# Patient Record
Sex: FEMALE | Race: WHITE | NOT HISPANIC OR LATINO | Employment: OTHER | URBAN - METROPOLITAN AREA
[De-identification: names, ages, dates, MRNs, and addresses within clinical notes are randomized per-mention and may not be internally consistent; named-entity substitution may affect disease eponyms.]

---

## 2017-08-30 ENCOUNTER — TRANSCRIBE ORDERS (OUTPATIENT)
Dept: ADMINISTRATIVE | Facility: HOSPITAL | Age: 82
End: 2017-08-30

## 2017-08-30 DIAGNOSIS — Z13.9 SCREENING: Primary | ICD-10-CM

## 2017-09-06 ENCOUNTER — HOSPITAL ENCOUNTER (OUTPATIENT)
Dept: MAMMOGRAPHY | Facility: HOSPITAL | Age: 82
Discharge: HOME | End: 2017-09-06
Admitting: FAMILY MEDICINE

## 2017-09-06 DIAGNOSIS — Z13.9 SCREENING: ICD-10-CM

## 2017-09-06 PROCEDURE — 77063 BREAST TOMOSYNTHESIS BI: CPT

## 2017-09-06 PROCEDURE — G0202 SCR MAMMO BI INCL CAD: HCPCS

## 2018-02-19 ENCOUNTER — TRANSCRIBE ORDERS (OUTPATIENT)
Dept: ADMINISTRATIVE | Facility: HOSPITAL | Age: 83
End: 2018-02-19

## 2018-02-19 DIAGNOSIS — Z78.0 POSTMENOPAUSAL: Primary | ICD-10-CM

## 2018-02-26 ENCOUNTER — APPOINTMENT (OUTPATIENT)
Dept: BONE DENSITY | Facility: HOSPITAL | Age: 83
End: 2018-02-26

## 2018-02-26 DIAGNOSIS — Z78.0 POSTMENOPAUSAL: ICD-10-CM

## 2018-02-26 PROCEDURE — 77080 DXA BONE DENSITY AXIAL: CPT

## 2018-08-07 ENCOUNTER — TRANSCRIBE ORDERS (OUTPATIENT)
Dept: ADMINISTRATIVE | Facility: HOSPITAL | Age: 83
End: 2018-08-07

## 2018-08-07 DIAGNOSIS — Z12.39 SCREENING BREAST EXAMINATION: Primary | ICD-10-CM

## 2018-09-10 ENCOUNTER — HOSPITAL ENCOUNTER (OUTPATIENT)
Dept: MAMMOGRAPHY | Facility: HOSPITAL | Age: 83
Discharge: HOME | End: 2018-09-10
Admitting: FAMILY MEDICINE

## 2018-09-10 DIAGNOSIS — Z12.39 SCREENING BREAST EXAMINATION: ICD-10-CM

## 2018-09-10 PROCEDURE — 77067 SCR MAMMO BI INCL CAD: CPT

## 2018-09-10 PROCEDURE — 77063 BREAST TOMOSYNTHESIS BI: CPT

## 2018-11-01 ENCOUNTER — HOSPITAL ENCOUNTER (EMERGENCY)
Facility: HOSPITAL | Age: 83
Discharge: HOME | End: 2018-11-01
Attending: EMERGENCY MEDICINE | Admitting: EMERGENCY MEDICINE

## 2018-11-01 VITALS
HEART RATE: 71 BPM | BODY MASS INDEX: 31.49 KG/M2 | TEMPERATURE: 98.9 F | OXYGEN SATURATION: 98 % | DIASTOLIC BLOOD PRESSURE: 84 MMHG | RESPIRATION RATE: 16 BRPM | WEIGHT: 184.44 LBS | HEIGHT: 64 IN | SYSTOLIC BLOOD PRESSURE: 162 MMHG

## 2018-11-01 DIAGNOSIS — T18.128A FOOD IMPACTION OF ESOPHAGUS, INITIAL ENCOUNTER: Primary | ICD-10-CM

## 2018-11-01 PROCEDURE — 99282 EMERGENCY DEPT VISIT SF MDM: CPT | Performed by: PHYSICIAN ASSISTANT

## 2018-11-01 PROCEDURE — 99282 EMERGENCY DEPT VISIT SF MDM: CPT

## 2018-11-01 NOTE — ED PROVIDER NOTES
"Subjective   History of Present Illness  History of Present Illness    Chief complaint: \"I have chicken stuck in my throat\"    Location: Upper esophagus    Quality/Severity:  \"Stuck\", moderate    Timing/Duration: Just prior to arrival    Modifying Factors: Nothing makes worse or better    Associated Symptoms: Denies shortness of breath.  Denies cough.  Denies rhinorrhea.  Denies chest pain.    Narrative: 84-year-old female presents after she was eating at a restaurant and felt like she got chicken stuck in her throat.  She went to the bathroom and tried to make herself vomit but the food did not appear to move.  She is never had this happen before.  She called her  was told to come to the ER.    Review of Systems  General: Denies fevers or chills.  Denies any weakness or fatigue.  Denies any weight loss or weight gain.  SKIN: Denies any rashes lesions or ulcers.  Denies color change.  ENT: Denies sore throat or rhinorrhea.  Denies ear pain.    EYES: Denies any blurred vision.  Denies any change in vision.  Denies any photophobia.  Denies any vision loss.  LUNGS: Denies any shortness of breath or wheezing.  Denies any cough.  Denies any hemoptysis.  CARDIAC: Denies any chest pain.  Denies palpitations.  Denies syncope.  Denies any edema  ABD: Denies any abdominal pain.  Denies any nausea or vomiting or diarrhea.  Denies any rectal bleeding.  Denies constipation  : Denies any dysuria, urgency, frequency or hematuria.  Denies discharge.  Denies flank pain.  NEURO: Denies any focal weakness.  Denies headache.  Denies seizures.  Denies changes in speech or difficulty walking.  ENDOCRINE: Denies polydipsia and polyuria  M/S: Denies arthralgias, back pain, myalgias or neck pain  HEME/LYMPH: Negative for adenopathy. Does not bruise/bleed easily.   PSYCH: Negative for suicidal ideas. Denies anxiety or depression  review was performed in addition to those in the above all other reviews are negative.      Past Medical " History:   Diagnosis Date   • Breast cancer (CMS/HCC) 2001    left lumpectomy       No Known Allergies    Past Surgical History:   Procedure Laterality Date   • BREAST BIOPSY     • BREAST LUMPECTOMY Left 2011   • CARPAL TUNNEL RELEASE     • HYSTERECTOMY         Family History   Problem Relation Age of Onset   • Stroke Mother    • Stroke Brother        Social History     Social History   • Marital status:      Social History Main Topics   • Smoking status: Never Smoker   • Smokeless tobacco: Never Used   • Alcohol use No   • Drug use: No   • Sexual activity: Defer     Other Topics Concern   • Not on file     No current facility-administered medications for this encounter.     Current Outpatient Prescriptions:   •  acetaminophen-codeine (TYLENOL #3) 300-30 MG per tablet, , Disp: , Rfl:   •  allopurinol (ZYLOPRIM) 300 MG tablet, , Disp: , Rfl:   •  clopidogrel (PLAVIX) 75 MG tablet, , Disp: , Rfl:   •  irbesartan-hydrochlorothiazide (AVALIDE) 300-12.5 MG tablet, , Disp: , Rfl:   •  metoprolol tartrate (LOPRESSOR) 50 MG tablet, , Disp: , Rfl:   •  pravastatin (PRAVACHOL) 40 MG tablet, , Disp: , Rfl:   •  venlafaxine XR (EFFEXOR-XR) 75 MG 24 hr capsule, , Disp: , Rfl:         Objective   Physical Exam  Vitals:    11/01/18 1314   BP: 180/90   Pulse:    Resp:    Temp:    SpO2:    Heart rate 67, respirations 20, oxygen saturation is 99% on room air, temperature 98.9    GENERAL: a/o x 4, NAD.  Speaking in full sentences.  SKIN: Warm pink and dry   HEENT:  PERRLA, EOM intact, conjunctiva normal, sclera clear.  Oropharynx clear.  Dentures removed.  No foreign bodies noted.  No pharyngeal erythema, edema or bleeding.   NECK: supple, no masses  LUNGS: Clear to auscultation bilaterally without wheezes, rales or rhonchi.  No accessory muscle use and no nasal flaring.  CARDIAC:  Regular rate and rhythm, S1-S2.  No murmurs, rubs or gallops.  No peripheral edema.  Equal pulses bilaterally.  ABDOMEN: Soft, nontender,  nondistended.  No guarding or rebound tenderness.  Normal bowel sounds.  MUSCULOSKELETAL: Moves all extremities well.  No deformity.  NEURO: Cranial nerves II through XII grossly intact.  No gross focal deficits.  Alert.  Normal speech and motor.  PSYCH: Anxious      Procedures           ED Course    During physical exam patient states that the foreign body sensation past and it has resolved.  She has no complaints at this time.    1335- pt doing well.  Asymptomatic.  No pain. bobby po without difficulty     Pt has been counseled on elevated BP and instructed to f/u with PMD for repeat BP check.  Discussed pertinent labs and imaging findings with the patient/family.  Patient/Family voiced understanding of need to follow-up for recheck, further testing as needed.  Return to the emergency Department warnings were given.          MDM  Number of Diagnoses or Management Options  Food impaction of esophagus, initial encounter: new and requires workup     Amount and/or Complexity of Data Reviewed  Tests in the medicine section of CPT®: reviewed and ordered    Risk of Complications, Morbidity, and/or Mortality  Presenting problems: low  Diagnostic procedures: low  Management options: low    Patient Progress  Patient progress: improved        Final diagnoses:   Food impaction of esophagus, initial encounter     Dictated utilizing Dragon dictation         Jess Ortega PA-C  11/01/18 3592

## 2018-11-01 NOTE — DISCHARGE INSTRUCTIONS
Return to the emergency department with worsening symptoms, uncontrolled pain, inability to tolerate oral liquids, fever greater than 101°F not controlled by Tylenol or as needed with emergent concerns.    Eat small bites and chew food well before swallowing.

## 2018-11-01 NOTE — ED NOTES
"When pt arrived in room 2, pt coughed and stated \"i think its gone now, I should have just gone home\" pt no longer coughing and short of air.      Wendy Small RN  11/01/18 7328    "

## 2019-09-16 ENCOUNTER — TRANSCRIBE ORDERS (OUTPATIENT)
Dept: ADMINISTRATIVE | Facility: HOSPITAL | Age: 84
End: 2019-09-16

## 2019-09-16 DIAGNOSIS — Z12.39 SCREENING BREAST EXAMINATION: Primary | ICD-10-CM

## 2019-09-20 ENCOUNTER — HOSPITAL ENCOUNTER (OUTPATIENT)
Dept: MAMMOGRAPHY | Facility: HOSPITAL | Age: 84
Discharge: HOME | End: 2019-09-20
Admitting: FAMILY MEDICINE

## 2019-09-20 DIAGNOSIS — Z12.39 SCREENING BREAST EXAMINATION: ICD-10-CM

## 2019-09-20 PROCEDURE — 77067 SCR MAMMO BI INCL CAD: CPT

## 2019-09-20 PROCEDURE — 77063 BREAST TOMOSYNTHESIS BI: CPT

## 2019-10-08 ENCOUNTER — TRANSCRIBE ORDERS (OUTPATIENT)
Dept: ADMINISTRATIVE | Facility: HOSPITAL | Age: 84
End: 2019-10-08

## 2019-10-08 DIAGNOSIS — I82.409 DEEP VEIN THROMBOSIS PROGRESSION (HCC): Primary | ICD-10-CM

## 2019-10-16 ENCOUNTER — HOSPITAL ENCOUNTER (OUTPATIENT)
Dept: ULTRASOUND IMAGING | Facility: HOSPITAL | Age: 84
Discharge: HOME | End: 2019-10-16
Admitting: FAMILY MEDICINE

## 2019-10-16 DIAGNOSIS — I82.409 DEEP VEIN THROMBOSIS PROGRESSION (HCC): ICD-10-CM

## 2019-10-16 PROCEDURE — 93971 EXTREMITY STUDY: CPT

## 2020-09-11 ENCOUNTER — TRANSCRIBE ORDERS (OUTPATIENT)
Dept: ADMINISTRATIVE | Facility: HOSPITAL | Age: 85
End: 2020-09-11

## 2020-09-11 DIAGNOSIS — Z12.31 VISIT FOR SCREENING MAMMOGRAM: Primary | ICD-10-CM

## 2020-10-12 ENCOUNTER — APPOINTMENT (OUTPATIENT)
Dept: MAMMOGRAPHY | Facility: HOSPITAL | Age: 85
End: 2020-10-12

## 2020-11-10 ENCOUNTER — HOSPITAL ENCOUNTER (OUTPATIENT)
Dept: MAMMOGRAPHY | Facility: HOSPITAL | Age: 85
Discharge: HOME | End: 2020-11-10
Admitting: FAMILY MEDICINE

## 2020-11-10 DIAGNOSIS — Z12.31 VISIT FOR SCREENING MAMMOGRAM: ICD-10-CM

## 2020-11-10 PROCEDURE — 77067 SCR MAMMO BI INCL CAD: CPT

## 2020-11-10 PROCEDURE — 77063 BREAST TOMOSYNTHESIS BI: CPT

## 2021-01-14 ENCOUNTER — APPOINTMENT (OUTPATIENT)
Dept: GENERAL RADIOLOGY | Facility: HOSPITAL | Age: 86
End: 2021-01-14

## 2021-01-14 ENCOUNTER — HOSPITAL ENCOUNTER (EMERGENCY)
Facility: HOSPITAL | Age: 86
Discharge: HOME | End: 2021-01-14
Attending: EMERGENCY MEDICINE | Admitting: EMERGENCY MEDICINE

## 2021-01-14 VITALS
DIASTOLIC BLOOD PRESSURE: 87 MMHG | RESPIRATION RATE: 20 BRPM | TEMPERATURE: 97.3 F | OXYGEN SATURATION: 96 % | HEIGHT: 63 IN | WEIGHT: 170 LBS | SYSTOLIC BLOOD PRESSURE: 173 MMHG | HEART RATE: 73 BPM | BODY MASS INDEX: 30.12 KG/M2

## 2021-01-14 DIAGNOSIS — S61.411A SKIN TEAR OF RIGHT HAND WITHOUT COMPLICATION, INITIAL ENCOUNTER: Primary | ICD-10-CM

## 2021-01-14 PROCEDURE — 99283 EMERGENCY DEPT VISIT LOW MDM: CPT

## 2021-01-14 PROCEDURE — 25010000002 TDAP 5-2.5-18.5 LF-MCG/0.5 SUSPENSION: Performed by: PHYSICIAN ASSISTANT

## 2021-01-14 PROCEDURE — 90471 IMMUNIZATION ADMIN: CPT | Performed by: PHYSICIAN ASSISTANT

## 2021-01-14 PROCEDURE — 99282 EMERGENCY DEPT VISIT SF MDM: CPT | Performed by: PHYSICIAN ASSISTANT

## 2021-01-14 PROCEDURE — 73130 X-RAY EXAM OF HAND: CPT

## 2021-01-14 PROCEDURE — 90715 TDAP VACCINE 7 YRS/> IM: CPT | Performed by: PHYSICIAN ASSISTANT

## 2021-01-14 RX ORDER — CEPHALEXIN 500 MG/1
500 CAPSULE ORAL 3 TIMES DAILY
Qty: 15 CAPSULE | Refills: 0 | Status: SHIPPED | OUTPATIENT
Start: 2021-01-14 | End: 2021-01-19

## 2021-01-14 RX ADMIN — TETANUS TOXOID, REDUCED DIPHTHERIA TOXOID AND ACELLULAR PERTUSSIS VACCINE, ADSORBED 0.5 ML: 5; 2.5; 8; 8; 2.5 SUSPENSION INTRAMUSCULAR at 18:04

## 2021-01-14 NOTE — DISCHARGE INSTRUCTIONS
Keep wound clean and dry for the next 24 hours.  After 24 hours you may wash with soap and water.  Please take antibiotics as directed to help prevent infection.  Follow-up with your PCP for wound recheck next week.  Please return to the emergency department, PCP, or urgent care if you develop any redness, swelling, or drainage from the wound.

## 2021-01-14 NOTE — ED PROVIDER NOTES
EMERGENCY DEPARTMENT ENCOUNTER      Room Number: HALB/HB    History is provided by the patient, no translation services needed    HPI:    Chief complaint: Hand injury    Location: Right dorsal hand    Quality/Severity: 5/10    Timing/Duration: Injury occurred just prior to arrival today    Modifying Factors: Patient applied pressure with gauze, bleeding controlled on arrival.  Patient states she does have some pain when she moves her fingers.    Associated Symptoms: Positive for laceration to right hand.  Denies any numbness or decreased range of motion.    Narrative: Pt is a 86 y.o. female who presents complaining of laceration to right hand that occurred just prior to arrival today.  Patient states she was grocery shopping at [employer] and went to pull a pack of water off of the shelf when she hit the metal shelf above it and cut the back of her hand.  She states she has thin skin, she is on aspirin and Plavix, and states she gets skin tears and also bruises frequently.  Bleeding is controlled on arrival.      PMD: Gonzalez Dai MD    REVIEW OF SYSTEMS  Review of Systems   Constitutional: Negative for chills and fever.   Respiratory: Negative for cough and shortness of breath.    Cardiovascular: Negative for chest pain and palpitations.   Musculoskeletal: Positive for arthralgias and joint swelling.   Skin: Positive for wound. Negative for rash.   Neurological: Negative for dizziness and syncope.   Hematological: Bruises/bleeds easily.   Psychiatric/Behavioral: Negative for confusion. The patient is not nervous/anxious.          PAST MEDICAL HISTORY  Active Ambulatory Problems     Diagnosis Date Noted   • No Active Ambulatory Problems     Resolved Ambulatory Problems     Diagnosis Date Noted   • No Resolved Ambulatory Problems     Past Medical History:   Diagnosis Date   • Breast cancer (CMS/HCC) 2001   • Hyperlipidemia    • Hypertension        PAST SURGICAL HISTORY  Past Surgical History:   Procedure Laterality  Date   • BREAST BIOPSY     • BREAST LUMPECTOMY Left 2011   • CARDIAC SURGERY     • CARPAL TUNNEL RELEASE     • HYSTERECTOMY     • TONSILLECTOMY         FAMILY HISTORY  Family History   Problem Relation Age of Onset   • Stroke Mother    • Stroke Brother    • Breast cancer Neg Hx        SOCIAL HISTORY  Social History     Socioeconomic History   • Marital status:      Spouse name: Not on file   • Number of children: Not on file   • Years of education: Not on file   • Highest education level: Not on file   Tobacco Use   • Smoking status: Never Smoker   • Smokeless tobacco: Never Used   Substance and Sexual Activity   • Alcohol use: No   • Drug use: No   • Sexual activity: Defer       ALLERGIES  Patient has no known allergies.    No current facility-administered medications for this encounter.     Current Outpatient Medications:   •  acetaminophen-codeine (TYLENOL #3) 300-30 MG per tablet, , Disp: , Rfl:   •  allopurinol (ZYLOPRIM) 300 MG tablet, , Disp: , Rfl:   •  clopidogrel (PLAVIX) 75 MG tablet, , Disp: , Rfl:   •  irbesartan-hydrochlorothiazide (AVALIDE) 300-12.5 MG tablet, , Disp: , Rfl:   •  metoprolol tartrate (LOPRESSOR) 50 MG tablet, , Disp: , Rfl:   •  pravastatin (PRAVACHOL) 40 MG tablet, , Disp: , Rfl:   •  venlafaxine XR (EFFEXOR-XR) 75 MG 24 hr capsule, , Disp: , Rfl:   •  cephalexin (KEFLEX) 500 MG capsule, Take 1 capsule by mouth 3 (Three) Times a Day for 5 days., Disp: 15 capsule, Rfl: 0    PHYSICAL EXAM  ED Triage Vitals   Temp Heart Rate Resp BP SpO2   01/14/21 1656 01/14/21 1658 01/14/21 1656 01/14/21 1659 01/14/21 1658   97.3 °F (36.3 °C) 73 20 173/87 96 %      Temp src Heart Rate Source Patient Position BP Location FiO2 (%)   01/14/21 1656 01/14/21 1656 01/14/21 1656 01/14/21 1656 --   Tympanic Monitor Lying Right arm        Physical Exam   Constitutional: She is oriented to person, place, and time and well-developed, well-nourished, and in no distress.   HENT:   Head: Normocephalic and  atraumatic.   Eyes: Pupils are equal, round, and reactive to light. Conjunctivae are normal.   Cardiovascular: Normal rate, regular rhythm and intact distal pulses.   Pulmonary/Chest: Effort normal. No respiratory distress.   Musculoskeletal: Normal range of motion.         General: No edema.      Right hand: She exhibits bony tenderness and laceration (3 cm skin tear to dorsal aspect of right hand.). She exhibits normal capillary refill. Normal sensation noted.        Hands:       Comments: Radial and ulnar pulses are 2+ in right upper extremity.   Neurological: She is alert and oriented to person, place, and time. GCS score is 15.   Skin: Skin is warm and dry.   Psychiatric: Mood, memory, affect and judgment normal.   Nursing note and vitals reviewed.        LAB RESULTS  Lab Results (last 24 hours)     ** No results found for the last 24 hours. **            I ordered the above labs and reviewed the results    RADIOLOGY  Xr Hand 3+ View Right    Result Date: 1/14/2021  CR Hand Min 3 Vws RT INDICATION: Acute right hand pain., Laceration to dorsal aspect of the right hand COMPARISON: None available. FINDINGS: 3 views of the right hand. No acute fracture or subluxation. The bones are osteopenic. No foreign body.     No acute fracture or subluxation. No evidence of foreign body. Signer Name: Cari Savage MD  Signed: 1/14/2021 6:07 PM  Workstation Name: [id number]  [facility]      I ordered the above radiologic testing and reviewed the results    PROCEDURES  Procedures      PROGRESS AND CONSULTS  ED Course as of Jan 14 1834   Thu Jan 14, 2021 1825 Discussed x-ray results with patient.  They are negative for any acute bony abnormality.  Her bleeding is controlled and she has actually formed a clot at the site of the skin tear.  She states she does not want any sutures placed, and she would rather have a bandage.  I did recommend we apply some Steri-Strips to her wound, and she is agreeable with  this.  Patient's wound was extensively cleaned with chlorhexidine, and irrigated with normal saline.  Mastisol and Steri-Strips applied, bandage placed with Telfa, 4 x 4, and Coban.  We will treat her with Keflex for infection prophylaxis since this wound is not entirely clean and present on the hand.  Patient's tetanus was updated today.  I have discussed return to ER warnings, patient is agreeable with plan and verbalizes understanding.    [KS]      ED Course User Index  [KS] Lashonda Rodriguez PA-C           MEDICAL DECISION MAKING  Results were reviewed/discussed with the patient and they were also made aware of online access. Pt also made aware that some labs, such as cultures, will not be resulted during ER visit and follow up with PMD is necessary.     MDM       My differential diagnosis of the upper extremity includes but is not limited to contusions of the shoulder, forearm, arm, wrist, elbow or hand, dislocations of shoulder, elbow, wrist, digits, shoulder sprain, elbow sprain, wrist sprain, digit sprain, shoulder strain, arm strain, forearm strain, elbow strain, wrist strain, hand sprain, digit strain, lacerations of the upper extremity, fractures both closed and open of radius, ulna and humerus, cellulitis or abscess, cervical radiculopathy, radial nerve palsy, neurogenic upper extremity pain.      DIAGNOSIS  Final diagnoses:   Skin tear of right hand without complication, initial encounter       Latest Documented Vital Signs:  As of 18:34 EST  BP- 173/87 HR- 73 Temp- 97.3 °F (36.3 °C) (Tympanic) O2 sat- 96%    DISPOSITION  Patient discharged home.    Discussed pertinent imaging findings with the patient/family.  Patient/Family voiced understanding of need to follow-up for recheck, further testing as needed.  Return to the emergency Department warnings were given.         Medication List      New Prescriptions    cephalexin 500 MG capsule  Commonly known as: KEFLEX  Take 1 capsule by mouth 3 (Three)  Times a Day for 5 days.           Where to Get Your Medications      These medications were sent to [id number] - [place] - [address] - [phone]  - [phone]   [address], [place] [address]    Phone: [phone]   · cephalexin 500 MG capsule             Follow-up Information     Gonzalez Dai MD. Call on 1/15/2021.    Specialty: Family Medicine  Why: To schedule follow-up appointment for wound check next week  Contact information:  [address] [place] [id number] [phone]                     Dictated utilizing Dragon dictation     Lashonda Rodriguez PA-C  01/14/21 3105

## 2021-03-17 ENCOUNTER — IMMUNIZATION (OUTPATIENT)
Dept: VACCINE CLINIC | Facility: HOSPITAL | Age: 86
End: 2021-03-17

## 2021-03-17 PROCEDURE — 91300 HC SARSCOV02 VAC 30MCG/0.3ML IM: CPT | Performed by: OBSTETRICS & GYNECOLOGY

## 2021-03-17 PROCEDURE — 0001A: CPT | Performed by: OBSTETRICS & GYNECOLOGY

## 2021-04-07 ENCOUNTER — IMMUNIZATION (OUTPATIENT)
Dept: VACCINE CLINIC | Facility: HOSPITAL | Age: 86
End: 2021-04-07

## 2021-04-07 PROCEDURE — 91300 HC SARSCOV02 VAC 30MCG/0.3ML IM: CPT | Performed by: OBSTETRICS & GYNECOLOGY

## 2021-04-07 PROCEDURE — 0002A: CPT | Performed by: OBSTETRICS & GYNECOLOGY

## 2021-04-12 ENCOUNTER — APPOINTMENT (OUTPATIENT)
Dept: CT IMAGING | Facility: HOSPITAL | Age: 86
End: 2021-04-12

## 2021-04-12 ENCOUNTER — HOSPITAL ENCOUNTER (OUTPATIENT)
Facility: HOSPITAL | Age: 86
Setting detail: OBSERVATION
Discharge: HOME | End: 2021-04-16
Attending: EMERGENCY MEDICINE | Admitting: FAMILY MEDICINE

## 2021-04-12 DIAGNOSIS — N39.0 ACUTE UTI: ICD-10-CM

## 2021-04-12 DIAGNOSIS — G30.1 DEMENTIA OF THE ALZHEIMER'S TYPE WITH LATE ONSET WITHOUT BEHAVIORAL DISTURBANCE (HCC): Primary | ICD-10-CM

## 2021-04-12 DIAGNOSIS — R41.82 ALTERED MENTAL STATUS, UNSPECIFIED ALTERED MENTAL STATUS TYPE: ICD-10-CM

## 2021-04-12 DIAGNOSIS — F02.80 DEMENTIA OF THE ALZHEIMER'S TYPE WITH LATE ONSET WITHOUT BEHAVIORAL DISTURBANCE (HCC): Primary | ICD-10-CM

## 2021-04-12 DIAGNOSIS — R44.3 HALLUCINATION: ICD-10-CM

## 2021-04-12 LAB
ALBUMIN SERPL-MCNC: 3.6 G/DL (ref 3.5–5.2)
ALBUMIN/GLOB SERPL: 0.9 G/DL
ALP SERPL-CCNC: 69 U/L (ref 39–117)
ALT SERPL W P-5'-P-CCNC: 26 U/L (ref 1–33)
ANION GAP SERPL CALCULATED.3IONS-SCNC: 12 MMOL/L (ref 5–15)
AST SERPL-CCNC: 31 U/L (ref 1–32)
BACTERIA UR QL AUTO: ABNORMAL /HPF
BASOPHILS # BLD AUTO: 0.08 10*3/MM3 (ref 0–0.2)
BASOPHILS NFR BLD AUTO: 0.6 % (ref 0–1.5)
BILIRUB SERPL-MCNC: 0.4 MG/DL (ref 0–1.2)
BILIRUB UR QL STRIP: ABNORMAL
BUN SERPL-MCNC: 26 MG/DL (ref 8–23)
BUN/CREAT SERPL: 16.7 (ref 7–25)
CALCIUM SPEC-SCNC: 9.9 MG/DL (ref 8.6–10.5)
CHLORIDE SERPL-SCNC: 96 MMOL/L (ref 98–107)
CLARITY UR: CLEAR
CO2 SERPL-SCNC: 28 MMOL/L (ref 22–29)
COARSE GRAN CASTS URNS QL MICRO: ABNORMAL /LPF
COLOR UR: YELLOW
CREAT SERPL-MCNC: 1.56 MG/DL (ref 0.57–1)
DEPRECATED RDW RBC AUTO: 45.7 FL (ref 37–54)
EOSINOPHIL # BLD AUTO: 0.04 10*3/MM3 (ref 0–0.4)
EOSINOPHIL NFR BLD AUTO: 0.3 % (ref 0.3–6.2)
ERYTHROCYTE [DISTWIDTH] IN BLOOD BY AUTOMATED COUNT: 13.6 % (ref 12.3–15.4)
GFR SERPL CREATININE-BSD FRML MDRD: 31 ML/MIN/1.73
GLOBULIN UR ELPH-MCNC: 4.1 GM/DL
GLUCOSE SERPL-MCNC: 113 MG/DL (ref 65–99)
GLUCOSE UR STRIP-MCNC: NEGATIVE MG/DL
HCT VFR BLD AUTO: 42.2 % (ref 34–46.6)
HGB BLD-MCNC: 13.6 G/DL (ref 12–15.9)
HGB UR QL STRIP.AUTO: NEGATIVE
HOLD SPECIMEN: NORMAL
HOLD SPECIMEN: NORMAL
HYALINE CASTS UR QL AUTO: ABNORMAL /LPF
IMM GRANULOCYTES # BLD AUTO: 0.08 10*3/MM3 (ref 0–0.05)
IMM GRANULOCYTES NFR BLD AUTO: 0.6 % (ref 0–0.5)
KETONES UR QL STRIP: ABNORMAL
LEUKOCYTE ESTERASE UR QL STRIP.AUTO: ABNORMAL
LYMPHOCYTES # BLD AUTO: 2.36 10*3/MM3 (ref 0.7–3.1)
LYMPHOCYTES NFR BLD AUTO: 16.6 % (ref 19.6–45.3)
MCH RBC QN AUTO: 29.9 PG (ref 26.6–33)
MCHC RBC AUTO-ENTMCNC: 32.2 G/DL (ref 31.5–35.7)
MCV RBC AUTO: 92.7 FL (ref 79–97)
MONOCYTES # BLD AUTO: 1.27 10*3/MM3 (ref 0.1–0.9)
MONOCYTES NFR BLD AUTO: 8.9 % (ref 5–12)
NEUTROPHILS NFR BLD AUTO: 10.36 10*3/MM3 (ref 1.7–7)
NEUTROPHILS NFR BLD AUTO: 73 % (ref 42.7–76)
NITRITE UR QL STRIP: NEGATIVE
NRBC BLD AUTO-RTO: 0 /100 WBC (ref 0–0.2)
PH UR STRIP.AUTO: 6 [PH] (ref 4.5–8)
PLATELET # BLD AUTO: 331 10*3/MM3 (ref 140–450)
PMV BLD AUTO: 11.3 FL (ref 6–12)
POTASSIUM SERPL-SCNC: 4.2 MMOL/L (ref 3.5–5.2)
PROT SERPL-MCNC: 7.7 G/DL (ref 6–8.5)
PROT UR QL STRIP: ABNORMAL
RBC # BLD AUTO: 4.55 10*6/MM3 (ref 3.77–5.28)
RBC # UR: ABNORMAL /HPF
REF LAB TEST METHOD: ABNORMAL
SARS-COV-2 RNA PNL SPEC NAA+PROBE: NOT DETECTED
SODIUM SERPL-SCNC: 136 MMOL/L (ref 136–145)
SP GR UR STRIP: 1.02 (ref 1–1.03)
SQUAMOUS #/AREA URNS HPF: ABNORMAL /HPF
TROPONIN T SERPL-MCNC: 0.04 NG/ML (ref 0–0.03)
UROBILINOGEN UR QL STRIP: ABNORMAL
WBC # BLD AUTO: 14.19 10*3/MM3 (ref 3.4–10.8)
WBC UR QL AUTO: ABNORMAL /HPF
WHOLE BLOOD HOLD SPECIMEN: NORMAL
WHOLE BLOOD HOLD SPECIMEN: NORMAL

## 2021-04-12 PROCEDURE — 87086 URINE CULTURE/COLONY COUNT: CPT | Performed by: EMERGENCY MEDICINE

## 2021-04-12 PROCEDURE — 80053 COMPREHEN METABOLIC PANEL: CPT | Performed by: EMERGENCY MEDICINE

## 2021-04-12 PROCEDURE — 85025 COMPLETE CBC W/AUTO DIFF WBC: CPT | Performed by: EMERGENCY MEDICINE

## 2021-04-12 PROCEDURE — C9803 HOPD COVID-19 SPEC COLLECT: HCPCS

## 2021-04-12 PROCEDURE — 25010000002 ENOXAPARIN PER 10 MG: Performed by: FAMILY MEDICINE

## 2021-04-12 PROCEDURE — 96361 HYDRATE IV INFUSION ADD-ON: CPT

## 2021-04-12 PROCEDURE — 99284 EMERGENCY DEPT VISIT MOD MDM: CPT

## 2021-04-12 PROCEDURE — G0378 HOSPITAL OBSERVATION PER HR: HCPCS

## 2021-04-12 PROCEDURE — 81001 URINALYSIS AUTO W/SCOPE: CPT | Performed by: EMERGENCY MEDICINE

## 2021-04-12 PROCEDURE — 99285 EMERGENCY DEPT VISIT HI MDM: CPT | Performed by: EMERGENCY MEDICINE

## 2021-04-12 PROCEDURE — 93010 ELECTROCARDIOGRAM REPORT: CPT | Performed by: INTERNAL MEDICINE

## 2021-04-12 PROCEDURE — 96372 THER/PROPH/DIAG INJ SC/IM: CPT

## 2021-04-12 PROCEDURE — 84484 ASSAY OF TROPONIN QUANT: CPT | Performed by: EMERGENCY MEDICINE

## 2021-04-12 PROCEDURE — 87635 SARS-COV-2 COVID-19 AMP PRB: CPT | Performed by: EMERGENCY MEDICINE

## 2021-04-12 PROCEDURE — 96376 TX/PRO/DX INJ SAME DRUG ADON: CPT

## 2021-04-12 PROCEDURE — 70450 CT HEAD/BRAIN W/O DYE: CPT

## 2021-04-12 PROCEDURE — 93005 ELECTROCARDIOGRAM TRACING: CPT | Performed by: EMERGENCY MEDICINE

## 2021-04-12 PROCEDURE — 25010000002 CEFTRIAXONE SODIUM-DEXTROSE 1-3.74 GM-%(50ML) RECONSTITUTED SOLUTION: Performed by: EMERGENCY MEDICINE

## 2021-04-12 RX ORDER — ACETAMINOPHEN 650 MG/1
650 SUPPOSITORY RECTAL EVERY 4 HOURS PRN
Status: DISCONTINUED | OUTPATIENT
Start: 2021-04-12 | End: 2021-04-16 | Stop reason: HOSPADM

## 2021-04-12 RX ORDER — SODIUM CHLORIDE 9 MG/ML
75 INJECTION, SOLUTION INTRAVENOUS CONTINUOUS
Status: DISCONTINUED | OUTPATIENT
Start: 2021-04-12 | End: 2021-04-13

## 2021-04-12 RX ORDER — SODIUM CHLORIDE 9 MG/ML
40 INJECTION, SOLUTION INTRAVENOUS AS NEEDED
Status: DISCONTINUED | OUTPATIENT
Start: 2021-04-12 | End: 2021-04-16 | Stop reason: HOSPADM

## 2021-04-12 RX ORDER — ONDANSETRON 4 MG/1
4 TABLET, FILM COATED ORAL EVERY 6 HOURS PRN
Status: DISCONTINUED | OUTPATIENT
Start: 2021-04-12 | End: 2021-04-16 | Stop reason: HOSPADM

## 2021-04-12 RX ORDER — CEFTRIAXONE 1 G/50ML
1 INJECTION, SOLUTION INTRAVENOUS EVERY 24 HOURS
Status: DISCONTINUED | OUTPATIENT
Start: 2021-04-13 | End: 2021-04-16 | Stop reason: HOSPADM

## 2021-04-12 RX ORDER — ACETAMINOPHEN 325 MG/1
650 TABLET ORAL EVERY 4 HOURS PRN
Status: DISCONTINUED | OUTPATIENT
Start: 2021-04-12 | End: 2021-04-16 | Stop reason: HOSPADM

## 2021-04-12 RX ORDER — ACETAMINOPHEN 160 MG/5ML
650 SOLUTION ORAL EVERY 4 HOURS PRN
Status: DISCONTINUED | OUTPATIENT
Start: 2021-04-12 | End: 2021-04-16 | Stop reason: HOSPADM

## 2021-04-12 RX ORDER — ASCORBIC ACID 500 MG
500 TABLET ORAL DAILY
COMMUNITY

## 2021-04-12 RX ORDER — SODIUM CHLORIDE 0.9 % (FLUSH) 0.9 %
1-10 SYRINGE (ML) INJECTION AS NEEDED
Status: DISCONTINUED | OUTPATIENT
Start: 2021-04-12 | End: 2021-04-16 | Stop reason: HOSPADM

## 2021-04-12 RX ORDER — CEFTRIAXONE 1 G/50ML
1 INJECTION, SOLUTION INTRAVENOUS ONCE
Status: COMPLETED | OUTPATIENT
Start: 2021-04-12 | End: 2021-04-12

## 2021-04-12 RX ORDER — MULTIPLE VITAMINS W/ MINERALS TAB 9MG-400MCG
1 TAB ORAL DAILY
COMMUNITY

## 2021-04-12 RX ORDER — ONDANSETRON 2 MG/ML
4 INJECTION INTRAMUSCULAR; INTRAVENOUS EVERY 6 HOURS PRN
Status: DISCONTINUED | OUTPATIENT
Start: 2021-04-12 | End: 2021-04-16 | Stop reason: HOSPADM

## 2021-04-12 RX ORDER — ASPIRIN 81 MG/1
81 TABLET, CHEWABLE ORAL DAILY
COMMUNITY

## 2021-04-12 RX ORDER — SODIUM CHLORIDE 0.9 % (FLUSH) 0.9 %
10 SYRINGE (ML) INJECTION EVERY 12 HOURS SCHEDULED
Status: DISCONTINUED | OUTPATIENT
Start: 2021-04-12 | End: 2021-04-16 | Stop reason: HOSPADM

## 2021-04-12 RX ADMIN — SODIUM CHLORIDE, PRESERVATIVE FREE 10 ML: 5 INJECTION INTRAVENOUS at 23:23

## 2021-04-12 RX ADMIN — ENOXAPARIN SODIUM 30 MG: 30 INJECTION SUBCUTANEOUS at 23:24

## 2021-04-12 RX ADMIN — SODIUM CHLORIDE 75 ML/HR: 9 INJECTION, SOLUTION INTRAVENOUS at 23:24

## 2021-04-12 RX ADMIN — CEFTRIAXONE 1 G: 1 INJECTION, SOLUTION INTRAVENOUS at 20:15

## 2021-04-13 LAB
ANION GAP SERPL CALCULATED.3IONS-SCNC: 20 MMOL/L (ref 5–15)
BUN SERPL-MCNC: 30 MG/DL (ref 8–23)
BUN/CREAT SERPL: 19.9 (ref 7–25)
CALCIUM SPEC-SCNC: 8.9 MG/DL (ref 8.6–10.5)
CHLORIDE SERPL-SCNC: 100 MMOL/L (ref 98–107)
CO2 SERPL-SCNC: 21 MMOL/L (ref 22–29)
CREAT SERPL-MCNC: 1.51 MG/DL (ref 0.57–1)
DEPRECATED RDW RBC AUTO: 47.4 FL (ref 37–54)
ERYTHROCYTE [DISTWIDTH] IN BLOOD BY AUTOMATED COUNT: 13.6 % (ref 12.3–15.4)
GFR SERPL CREATININE-BSD FRML MDRD: 33 ML/MIN/1.73
GLUCOSE SERPL-MCNC: 121 MG/DL (ref 65–99)
HCT VFR BLD AUTO: 39.6 % (ref 34–46.6)
HGB BLD-MCNC: 12.4 G/DL (ref 12–15.9)
MCH RBC QN AUTO: 29.7 PG (ref 26.6–33)
MCHC RBC AUTO-ENTMCNC: 31.3 G/DL (ref 31.5–35.7)
MCV RBC AUTO: 95 FL (ref 79–97)
PLATELET # BLD AUTO: 218 10*3/MM3 (ref 140–450)
PMV BLD AUTO: 10.8 FL (ref 6–12)
POTASSIUM SERPL-SCNC: 3.6 MMOL/L (ref 3.5–5.2)
RBC # BLD AUTO: 4.17 10*6/MM3 (ref 3.77–5.28)
SODIUM SERPL-SCNC: 141 MMOL/L (ref 136–145)
TROPONIN T SERPL-MCNC: 0.02 NG/ML (ref 0–0.03)
WBC # BLD AUTO: 10.17 10*3/MM3 (ref 3.4–10.8)

## 2021-04-13 PROCEDURE — 80048 BASIC METABOLIC PNL TOTAL CA: CPT | Performed by: FAMILY MEDICINE

## 2021-04-13 PROCEDURE — 25010000002 ONDANSETRON PER 1 MG: Performed by: FAMILY MEDICINE

## 2021-04-13 PROCEDURE — 96372 THER/PROPH/DIAG INJ SC/IM: CPT

## 2021-04-13 PROCEDURE — 96361 HYDRATE IV INFUSION ADD-ON: CPT

## 2021-04-13 PROCEDURE — 96365 THER/PROPH/DIAG IV INF INIT: CPT

## 2021-04-13 PROCEDURE — 25010000002 ENOXAPARIN PER 10 MG: Performed by: FAMILY MEDICINE

## 2021-04-13 PROCEDURE — 97161 PT EVAL LOW COMPLEX 20 MIN: CPT

## 2021-04-13 PROCEDURE — 96375 TX/PRO/DX INJ NEW DRUG ADDON: CPT

## 2021-04-13 PROCEDURE — 25010000002 CEFTRIAXONE SODIUM-DEXTROSE 1-3.74 GM-%(50ML) RECONSTITUTED SOLUTION: Performed by: FAMILY MEDICINE

## 2021-04-13 PROCEDURE — G0378 HOSPITAL OBSERVATION PER HR: HCPCS

## 2021-04-13 PROCEDURE — 84484 ASSAY OF TROPONIN QUANT: CPT | Performed by: FAMILY MEDICINE

## 2021-04-13 PROCEDURE — 85027 COMPLETE CBC AUTOMATED: CPT | Performed by: FAMILY MEDICINE

## 2021-04-13 RX ORDER — METOPROLOL TARTRATE 50 MG/1
50 TABLET, FILM COATED ORAL 2 TIMES DAILY
Status: DISCONTINUED | OUTPATIENT
Start: 2021-04-13 | End: 2021-04-16 | Stop reason: HOSPADM

## 2021-04-13 RX ORDER — VENLAFAXINE HYDROCHLORIDE 37.5 MG/1
75 CAPSULE, EXTENDED RELEASE ORAL DAILY
Status: DISCONTINUED | OUTPATIENT
Start: 2021-04-13 | End: 2021-04-16 | Stop reason: HOSPADM

## 2021-04-13 RX ORDER — CLOPIDOGREL BISULFATE 75 MG/1
75 TABLET ORAL DAILY
Status: DISCONTINUED | OUTPATIENT
Start: 2021-04-13 | End: 2021-04-16 | Stop reason: HOSPADM

## 2021-04-13 RX ORDER — ASPIRIN 81 MG/1
81 TABLET, CHEWABLE ORAL DAILY
Status: DISCONTINUED | OUTPATIENT
Start: 2021-04-13 | End: 2021-04-16 | Stop reason: HOSPADM

## 2021-04-13 RX ORDER — MELATONIN
5000 DAILY
Status: DISCONTINUED | OUTPATIENT
Start: 2021-04-13 | End: 2021-04-16 | Stop reason: HOSPADM

## 2021-04-13 RX ORDER — ALLOPURINOL 300 MG/1
300 TABLET ORAL DAILY
Status: DISCONTINUED | OUTPATIENT
Start: 2021-04-13 | End: 2021-04-16 | Stop reason: HOSPADM

## 2021-04-13 RX ORDER — PRAVASTATIN SODIUM 20 MG
40 TABLET ORAL NIGHTLY
Status: DISCONTINUED | OUTPATIENT
Start: 2021-04-13 | End: 2021-04-16 | Stop reason: HOSPADM

## 2021-04-13 RX ORDER — LOSARTAN POTASSIUM 50 MG/1
100 TABLET ORAL DAILY
Status: DISCONTINUED | OUTPATIENT
Start: 2021-04-13 | End: 2021-04-16 | Stop reason: HOSPADM

## 2021-04-13 RX ADMIN — ENOXAPARIN SODIUM 30 MG: 30 INJECTION SUBCUTANEOUS at 23:32

## 2021-04-13 RX ADMIN — SODIUM CHLORIDE, PRESERVATIVE FREE 10 ML: 5 INJECTION INTRAVENOUS at 20:31

## 2021-04-13 RX ADMIN — LOSARTAN POTASSIUM 100 MG: 50 TABLET, FILM COATED ORAL at 08:45

## 2021-04-13 RX ADMIN — ALLOPURINOL 300 MG: 300 TABLET ORAL at 08:44

## 2021-04-13 RX ADMIN — CLOPIDOGREL BISULFATE 75 MG: 75 TABLET ORAL at 08:44

## 2021-04-13 RX ADMIN — Medication 5000 UNITS: at 08:44

## 2021-04-13 RX ADMIN — METOPROLOL TARTRATE 50 MG: 50 TABLET, FILM COATED ORAL at 20:30

## 2021-04-13 RX ADMIN — METOPROLOL TARTRATE 50 MG: 50 TABLET, FILM COATED ORAL at 08:44

## 2021-04-13 RX ADMIN — CEFTRIAXONE 1 G: 1 INJECTION, SOLUTION INTRAVENOUS at 20:30

## 2021-04-13 RX ADMIN — ONDANSETRON 4 MG: 2 INJECTION INTRAMUSCULAR; INTRAVENOUS at 18:18

## 2021-04-13 RX ADMIN — ASPIRIN 81 MG CHEWABLE TABLET 81 MG: 81 TABLET CHEWABLE at 08:44

## 2021-04-13 RX ADMIN — VENLAFAXINE HYDROCHLORIDE 75 MG: 37.5 CAPSULE, EXTENDED RELEASE ORAL at 08:45

## 2021-04-13 RX ADMIN — ACETAMINOPHEN 650 MG: 325 TABLET, FILM COATED ORAL at 20:30

## 2021-04-13 RX ADMIN — SODIUM CHLORIDE, PRESERVATIVE FREE 10 ML: 5 INJECTION INTRAVENOUS at 09:22

## 2021-04-13 RX ADMIN — PRAVASTATIN SODIUM 40 MG: 20 TABLET ORAL at 20:30

## 2021-04-13 NOTE — PLAN OF CARE
Discharge Planning Assessment  KAVITA Caldwell     Patient Name: Antonette King  MRN: [id number]  Today's Date: 4/13/2021    Admit Date: 4/12/2021    Discharge Needs Assessment       Row Name 04/13/21 1049       Living Environment    Lives With  spouse    Name(s) of Who Lives With Rosetta Escalona    Current Living Arrangements  home/apartment/condo    Duration at Residence  20 years    Potentially Unsafe Housing Conditions  -- none    Primary Care Provided by  self    Provides Primary Care For  no one    Family Caregiver if Needed  spouse    Family Caregiver Names   and daughters    Quality of Family Relationships  unable to assess    Able to Return to Prior Arrangements  yes       Resource/Environmental Concerns    Resource/Environmental Concerns  none    Transportation Concerns  -- none       Transition Planning    Patient/Family Anticipates Transition to  home    Patient/Family Anticipated Services at Transition  none    Transportation Anticipated  family or friend will provide       Discharge Needs Assessment    Readmission Within the Last 30 Days  no previous admission in last 30 days    Current Outpatient/Agency/Support Group  -- none    Equipment Currently Used at Home  none    Concerns to be Addressed  no discharge needs identified    Equipment Needed After Discharge  none    Outpatient/Agency/Support Group Needs  -- none    Discharge Facility/Level of Care Needs  -- none    Provided Post Acute Provider List?  Refused    Refused Provider List Comment  Pt declined    Provided Post Acute Provider Quality & Resource List?  Refused    Refused Quality and Resource List Comment  Pt declined          Discharge Plan       Row Name 04/13/21 1056       Plan    Plan Comments  I spoke with the patient at bedside with her permission.  I introduced myself and explained my role.  I verified the information on the facesheet and the patients PCP as Dr. Dai.  She advised that she uses [facility] in [place]  and is able to afford and obtain her medications.  She denies having a living will and declined paperwork regarding same.  She lives in a single story home with her  for the past 20 years.  There are no steps to enter the home and she is able to enter and maneuver around her home with no issues.  She advised that she has been packing up her home because she is moving to a smaller home.  She is independent with her ADL's but stated that she is requiring assistance with cleaning her home.  She drives and has a car.  Her  will pick her up at discharge.  She denies having or needing DME, home health or other community resources at discharge. She will go home with her  at discharge and is agreeable to that plan.  She had no further questions or concerns regarding her discharge at this time. TAE signed.  CM will continue to follow.         Continued Care and Services - Admitted Since 4/12/2021    Coordination has not been started for this encounter.         Demographic Summary       Row Name 04/13/21 1049       General Information    Admission Type  observation    Arrived From  home    Required Notices Provided  Observation Status Notice    Referral Source  admission list    Reason for Consult  discharge planning    Preferred Language  English     Used During This Interaction  no       Contact Information    Permission Granted to Share Info With            Functional Status    No documentation.       Psychosocial    No documentation.       Abuse/Neglect    No documentation.       Legal    No documentation.       Substance Abuse    No documentation.       Patient Forms    No documentation.           Patrizia Quintero RN  Goal Outcome Evaluation:

## 2021-04-13 NOTE — H&P
HISTORY AND PHYSICAL      Patient Care Team:  Gonzalez Dai MD as PCP - General  Gonzalez Dai MD as PCP - Family Medicine    CHIEF COMPLAINT: Confusion hallucinations    HISTORY OF PRESENT ILLNESS:    87-year-old white female brought by her  to the emergency room because of confusion and hallucinations.  This history is obtained from the ER physician.  Patient developed urinary tract infection and admitted for the evaluation treatment.  This morning patient is awake alert oriented x3 she able to give a history she does not recall that she was having any hallucinations.  She thinks she was a little confused this by her  prior to the hospital.  Denies any fever chills diarrhea nausea vomiting    Past Medical History:   Diagnosis Date   • Breast cancer (CMS/HCC) 2001    left lumpectomy   • Hyperlipidemia    • Hypertension      Past Surgical History:   Procedure Laterality Date   • BREAST BIOPSY     • BREAST LUMPECTOMY Left 2011   • CARDIAC SURGERY     • CARPAL TUNNEL RELEASE     • HYSTERECTOMY     • TONSILLECTOMY       Family History   Problem Relation Age of Onset   • Stroke Mother    • Stroke Brother    • Breast cancer Neg Hx      Social History     Tobacco Use   • Smoking status: Never Smoker   • Smokeless tobacco: Never Used   Vaping Use   • Vaping Use: Never used   Substance Use Topics   • Alcohol use: No   • Drug use: No     Medications Prior to Admission   Medication Sig Dispense Refill Last Dose   • allopurinol (ZYLOPRIM) 300 MG tablet Take 300 mg by mouth Daily.   4/12/2021   • ascorbic acid (VITAMIN C) 500 MG tablet Take 500 mg by mouth Daily.   4/12/2021   • aspirin 81 MG chewable tablet Chew 81 mg Daily.   4/12/2021   • clopidogrel (PLAVIX) 75 MG tablet Take 75 mg by mouth Daily.   4/12/2021   • losartan (COZAAR) 100 MG tablet Take 100 mg by mouth Daily.   4/12/2021   • metoprolol tartrate (LOPRESSOR) 50 MG tablet Take 50 mg by mouth 2 (Two) Times a Day.   4/12/2021 at 1100   •  "multivitamin with minerals (MULTIVITAMIN ADULT PO) Take 1 tablet by mouth Daily.   4/12/2021   • pravastatin (PRAVACHOL) 40 MG tablet Take 40 mg by mouth Every Night.   4/11/2021   • venlafaxine XR (EFFEXOR-XR) 75 MG 24 hr capsule Take 75 mg by mouth Daily.   4/12/2021   • vitamin D3 (EQL Vitamin D3) 125 MCG (5000 UT) capsule capsule Take 5,000 Units by mouth Daily.   4/12/2021     Allergies:  Patient has no known allergies.     Review of Systems   Constitutional: Negative for activity change, appetite change and fatigue.   HENT: Positive for hearing loss. Negative for congestion.    Respiratory: Negative for cough, chest tightness, shortness of breath and wheezing.    Cardiovascular: Negative for chest pain.   Gastrointestinal: Negative for abdominal distention, abdominal pain, diarrhea, nausea and vomiting.   Endocrine: Negative for polyphagia and polyuria.   Genitourinary: Negative for frequency.   Musculoskeletal: Positive for back pain.   Skin: Negative for rash.   Neurological: Negative for light-headedness.   Hematological: Does not bruise/bleed easily.   Psychiatric/Behavioral: Negative for agitation and behavioral problems.       Vital Signs  Temp:  [97.3 °F (36.3 °C)-98.1 °F (36.7 °C)] 97.3 °F (36.3 °C)  Heart Rate:  [74-82] 82  Resp:  [16] 16  BP: (116-148)/(76-94) 143/87  Oxygen Therapy  SpO2: 96 %  Pulse Oximetry Type: Intermittent  Device (Oxygen Therapy): room air}  Body mass index is 29.18 kg/m².  Flowsheet Rows      First Filed Value   Admission Height  162.6 cm (64\") Documented at 04/12/2021 1814   Admission Weight  77.1 kg (170 lb) Documented at 04/12/2021 1814                 Physical Exam  Vitals and nursing note reviewed.   Constitutional:       Appearance: She is well-developed.   HENT:      Head: Normocephalic.      Right Ear: Decreased hearing noted.      Left Ear: Decreased hearing noted.   Eyes:      Conjunctiva/sclera: Conjunctivae normal.   Neck:      Thyroid: No thyromegaly.      " Vascular: No JVD.   Cardiovascular:      Rate and Rhythm: Normal rate and regular rhythm.      Heart sounds: Normal heart sounds. No murmur heard.     Pulmonary:      Effort: Pulmonary effort is normal. No respiratory distress.      Breath sounds: Normal breath sounds. No wheezing or rales.   Abdominal:      General: Bowel sounds are normal. There is no distension.      Palpations: Abdomen is soft.      Tenderness: There is no abdominal tenderness. There is no guarding.   Musculoskeletal:      Cervical back: Normal range of motion.   Skin:     General: Skin is warm and dry.      Findings: No rash.   Neurological:      Mental Status: She is alert and oriented to person, place, and time.      Cranial Nerves: Cranial nerves are intact.      Motor: Motor function is intact.   Psychiatric:         Attention and Perception: Attention normal.         Mood and Affect: Mood normal.         Speech: Speech normal.         Behavior: Behavior normal.          Debilities/Disabilities Identified: None    Emotional Behavior: Appropriate    Results Review:    I reviewed the patient's new clinical results.  Lab Results (most recent)     Procedure Component Value Units Date/Time    Troponin [[id number]]  (Normal) Collected: 04/13/21 0708    Specimen: Blood Updated: 04/13/21 0754     Troponin T 0.024 ng/mL     Narrative:      Troponin T Reference Range:  <= 0.03 ng/mL-   Negative for AMI  >0.03 ng/mL-     Abnormal for myocardial necrosis.  Clinicians would have to utilize clinical acumen, EKG, Troponin and serial changes to determine if it is an Acute Myocardial Infarction or myocardial injury due to an underlying chronic condition.       Results may be falsely decreased if patient taking Biotin.      CBC (No Diff) [[id number]]  (Abnormal) Collected: 04/13/21 0707    Specimen: Blood Updated: 04/13/21 0711     WBC 10.17 10*3/mm3      RBC 4.17 10*6/mm3      Hemoglobin 12.4 g/dL      Hematocrit 39.6 %      MCV 95.0 fL      MCH 29.7 pg       MCHC 31.3 g/dL      RDW 13.6 %      RDW-SD 47.4 fl      MPV 10.8 fL      Platelets 218 10*3/mm3     COVID PRE-OP / PRE-PROCEDURE SCREENING ORDER (NO ISOLATION) - Swab, Nasal Cavity [[id number]]  (Normal) Collected: 04/12/21 2214    Specimen: Swab from Nasal Cavity Updated: 04/12/21 2252    Narrative:      The following orders were created for panel order COVID PRE-OP / PRE-PROCEDURE SCREENING ORDER (NO ISOLATION) - Swab, Nasal Cavity.  Procedure                               Abnormality         Status                     ---------                               -----------         ------                     COVID-19,[facility] IN-SOP[facility]...[[id number]]  Normal              Final result                 Please view results for these tests on the individual orders.    COVID-19,[facility] IN-HOUSE,Nasal Swab No Transport Media 3-4 HR TAT - Swab, Nasal Cavity [[id number]]  (Normal) Collected: 04/12/21 2214    Specimen: Swab from Nasal Cavity Updated: 04/12/21 2252     COVID19 Not Detected    Narrative:      Fact sheet for providers: https://www.fda.gov/media/224563/download     Fact sheet for patients: https://www.fda.gov/media/947399/download    Test performed by PCR.    Consider negative results in combination with clinical observations, patient history, and epidemiological information.    Troponin [[id number]]  (Abnormal) Collected: 04/12/21 1927    Specimen: Blood Updated: 04/12/21 2119     Troponin T 0.036 ng/mL     Narrative:      Troponin T Reference Range:  <= 0.03 ng/mL-   Negative for AMI  >0.03 ng/mL-     Abnormal for myocardial necrosis.  Clinicians would have to utilize clinical acumen, EKG, Troponin and serial changes to determine if it is an Acute Myocardial Infarction or myocardial injury due to an underlying chronic condition.       Results may be falsely decreased if patient taking Biotin.      Comprehensive Metabolic Panel [[id number]]  (Abnormal) Collected: 04/12/21 1927    Specimen: Blood Updated: 04/12/21 2111      Glucose 113 mg/dL      BUN 26 mg/dL      Creatinine 1.56 mg/dL      Sodium 136 mmol/L      Potassium 4.2 mmol/L      Comment: Slight hemolysis detected by analyzer. Results may be affected.        Chloride 96 mmol/L      CO2 28.0 mmol/L      Calcium 9.9 mg/dL      Total Protein 7.7 g/dL      Albumin 3.60 g/dL      ALT (SGPT) 26 U/L      AST (SGOT) 31 U/L      Alkaline Phosphatase 69 U/L      Total Bilirubin 0.4 mg/dL      eGFR Non African Amer 31 mL/min/1.73      Globulin 4.1 gm/dL      A/G Ratio 0.9 g/dL      BUN/Creatinine Ratio 16.7     Anion Gap 12.0 mmol/L     Narrative:      GFR Normal >60  Chronic Kidney Disease <60  Kidney Failure <15      CBC & Differential [[id number]]  (Abnormal) Collected: 04/12/21 1927    Specimen: Blood Updated: 04/12/21 2056    Narrative:      The following orders were created for panel order CBC & Differential.  Procedure                               Abnormality         Status                     ---------                               -----------         ------                     CBC Auto Differential[[id number]]        Abnormal            Final result                 Please view results for these tests on the individual orders.    CBC Auto Differential [[id number]]  (Abnormal) Collected: 04/12/21 1927    Specimen: Blood Updated: 04/12/21 2056     WBC 14.19 10*3/mm3      RBC 4.55 10*6/mm3      Hemoglobin 13.6 g/dL      Hematocrit 42.2 %      MCV 92.7 fL      MCH 29.9 pg      MCHC 32.2 g/dL      RDW 13.6 %      RDW-SD 45.7 fl      MPV 11.3 fL      Platelets 331 10*3/mm3      Neutrophil % 73.0 %      Lymphocyte % 16.6 %      Monocyte % 8.9 %      Eosinophil % 0.3 %      Basophil % 0.6 %      Immature Grans % 0.6 %      Neutrophils, Absolute 10.36 10*3/mm3      Lymphocytes, Absolute 2.36 10*3/mm3      Monocytes, Absolute 1.27 10*3/mm3      Eosinophils, Absolute 0.04 10*3/mm3      Basophils, Absolute 0.08 10*3/mm3      Immature Grans, Absolute 0.08 10*3/mm3      nRBC 0.0 /100 WBC      [place] Draw [[id number]] Collected: 04/12/21 1927    Specimen: Blood Updated: 04/12/21 2030    Narrative:      The following orders were created for panel order [place] Draw.  Procedure                               Abnormality         Status                     ---------                               -----------         ------                     Light Blue Top[[id number]]                                   Final result               Green Top (Gel)[[id number]]                                  Final result               Lavender Top[[id number]]                                     Final result               Gold Top - SST[[id number]]                                   Final result                 Please view results for these tests on the individual orders.    Light Blue Top [[id number]] Collected: 04/12/21 1927    Specimen: Blood Updated: 04/12/21 2030     Extra Tube hold for add-on     Comment: Auto resulted       Green Top (Gel) [[id number]] Collected: 04/12/21 1927    Specimen: Blood Updated: 04/12/21 2030     Extra Tube Hold for add-ons.     Comment: Auto resulted.       Lavender Top [[id number]] Collected: 04/12/21 1927    Specimen: Blood Updated: 04/12/21 2030     Extra Tube hold for add-on     Comment: Auto resulted       Gold Top - SST [[id number]] Collected: 04/12/21 1927    Specimen: Blood Updated: 04/12/21 2030     Extra Tube Hold for add-ons.     Comment: Auto resulted.       Urinalysis, Microscopic Only - Urine, Clean Catch [[id number]]  (Abnormal) Collected: 04/12/21 1926    Specimen: Urine, Clean Catch Updated: 04/12/21 1949     RBC, UA 0-2 /HPF      WBC, UA 31-50 /HPF      Bacteria, UA 3+ /HPF      Squamous Epithelial Cells, UA 13-20 /HPF      Hyaline Casts, UA None Seen /LPF      Coarse Granular Casts, UA 3-6 /LPF      Methodology Manual Light Microscopy    Urine Culture - Urine, Urine, Clean Catch [[id number]] Collected: 04/12/21 1926    Specimen: Urine, Clean Catch Updated: 04/12/21 1949    Urinalysis With  Culture If Indicated - Urine, Clean Catch [[id number]]  (Abnormal) Collected: 04/12/21 1926    Specimen: Urine, Clean Catch Updated: 04/12/21 1938     Color, UA Yellow     Appearance, UA Clear     pH, UA 6.0     Specific Gravity, UA 1.020     Glucose, UA Negative     Ketones, UA Trace     Bilirubin, UA Small (1+)     Blood, UA Negative     Protein,  mg/dL (2+)     Leuk Esterase, UA Small (1+)     Nitrite, UA Negative     Urobilinogen, UA 1.0 E.U./dL          Imaging Results (Most Recent)     Procedure Component Value Units Date/Time    CT Head Without Contrast [[id number]] Collected: 04/12/21 2125     Updated: 04/12/21 2133    Narrative:      CT Head WO    HISTORY:   Hallucinations for the last 2 days    TECHNIQUE:   Routine noncontrast head CT. Radiation dose reduction techniques included automated exposure control or exposure modulation based on body size. Radiation audit for CT and nuclear cardiology exams in the last 12 months: 0.    COMPARISON:   CT head from 11/29/2013    FINDINGS:       No acute intracranial hemorrhage, mass lesion, or abnormal extra-axial fluid collection. No midline shift or focal mass effect. Ventricular system is normal in size and configuration. .    The gray-white matter differentiation is preserved. There are scattered ill-defined and patchy hypoattenuating foci within the bilateral cerebral and deep white matter which are nonspecific but most commonly associated with chronic microvascular ischemic  change. Chronic appearing lacunar infarct near the level the right caudate head. More focal lucency at the level the left precentral gyrus also has a chronic appearance. Findings are new compared to the prior exam from 2013.    Visualized paranasal sinuses are clear. Nonspecific right mastoid effusion.    No acute osseous abnormality.        Impression:        1.  No definite acute intracranial abnormality by noncontrast CT of the head.  2.  There are are  3.  Worsening chronic  microvascular ischemic changes with interval development of a remote lacunar infarct near the level the right caudate head and with also focal area of chronic appearing change in the left precentral gyrus.    Signer Name: DAVID PATEL MD   Signed: 4/12/2021 9:25 PM   Workstation Name: [email or web address]    [facility]        reviewed    ECG/EMG Results (most recent)     Procedure Component Value Units Date/Time    ECG 12 Lead [[id number]] Collected: 04/12/21 2026     Updated: 04/12/21 2027     QT Interval 424 ms     Narrative:      HEART RATE= 77  bpm  RR Interval= 776  ms  ME Interval= 166  ms  P Horizontal Axis= -13  deg  P Front Axis= 60  deg  QRSD Interval= 96  ms  QT Interval= 424  ms  QRS Axis= 35  deg  T Wave Axis= 51  deg  - BORDERLINE ECG -  Sinus rhythm  Probable left atrial enlargement  Borderline T wave abnormalities  Electronically Signed By:   Date and Time of Study: 2021-04-12 20:26:22        reviewed    Assessment/Plan       1.  Urinary tract infection continue with IV Rocephin await cultures    2.  Metabolic encephalopathy secondary tract infection resolved    3.  Hypertension stable nothing acute continue with home medications    4.  Hyperlipidemia stable continue statin      5.  Multijoint osteoarthritis stable    6.  Hyperuricemia continue with home allopurinol    7.  DVT prophylaxis SCD      I discussed the patients findings and my recommendations with patient and nursing staff.     Poli Alejandro MD  04/13/21  07:55 EDT      Much of this encounter note is an electronic transcription/translation of spoken language to printed text using Dragon Software

## 2021-04-13 NOTE — THERAPY DISCHARGE NOTE
Acute Care - Physical Therapy Initial Evaluation/Discharge   Shirin Echevarria     Patient Name: nAtonette King  : 1934  MRN: [id number]  Today's Date: 2021   Onset of Illness/Injury or Date of Surgery: 21     Referring Physician: Dr. Melvin       Admit Date: 2021    Visit Dx:    ICD-10-CM ICD-9-CM   1. Acute UTI  N39.0 599.0   2. Hallucination  R44.3 780.1   3. Altered mental status, unspecified altered mental status type  R41.82 780.97     Patient Active Problem List   Diagnosis   • Acute UTI     Past Medical History:   Diagnosis Date   • Breast cancer (CMS/HCC)     left lumpectomy   • Hyperlipidemia    • Hypertension      Past Surgical History:   Procedure Laterality Date   • BREAST BIOPSY     • BREAST LUMPECTOMY Left    • CARDIAC SURGERY     • CARPAL TUNNEL RELEASE     • HYSTERECTOMY     • TONSILLECTOMY            PT Assessment (last 12 hours)      PT Evaluation and Treatment     Row Name 21 1050          Physical Therapy Time and Intention    Subjective Information  no complaints  -BP     Document Type  discharge evaluation/summary  -BP     Mode of Treatment  physical therapy  -BP     Patient Effort  adequate  -BP     Symptoms Noted During/After Treatment  none  -BP     Row Name 21 1050          General Information    Patient Profile Reviewed  yes  -BP     Onset of Illness/Injury or Date of Surgery  21  -BP     Referring Physician  Dr. Melvin   -BP     Patient Observations  alert;cooperative;agree to therapy  -BP     Patient/Family/Caregiver Comments/Observations  Patient supine in bed resting with HOB elevated. IV line in use.  Patient agreeable to PT evaluation  -BP     Prior Level of Function  independent:;gait;transfer;bed mobility;ADL's;all household mobility Pt assists with care of spouse  -BP     Equipment Currently Used at Home  none  -BP     Pertinent History of Current Functional Problem  Patient presents to ED due to confusion and hallucinations.  Patient diagnosed with an acute UTI. Patients confusion has now improved. She reports at baseline she is independent with mobility and ADL's. She assist with care of spouse. Patient is oriented x 3 however demonstrates intermittent confusion within conversation however difficult at times to assess as patient is very hard of hearing.   -BP     Existing Precautions/Restrictions  fall  -BP     Risks Reviewed  patient:;LOB;increased discomfort  -BP     Benefits Reviewed  patient:;improve function;increase independence;increase strength  -BP     Barriers to Rehab  hearing deficit  -BP     Row Name 04/13/21 1050          Previous Level of Function/Home Environm    Household Ambulation, Premorbid Functional Level  independent  -BP     Row Name 04/13/21 1050          Living Environment    Current Living Arrangements  home/apartment/condo  -BP     Home Accessibility  stairs to enter home  -BP     Lives With  spouse  -BP     Row Name 04/13/21 1050          Home Main Entrance    Number of Stairs, Main Entrance  one  -BP     Row Name 04/13/21 1050          Home Use of Assistive/Adaptive Equipment    Equipment Currently Used at Home  none  -BP     Row Name 04/13/21 1050          Cognition    Orientation Status (Cognition)  oriented to;person;place;time  -BP     Follows Commands (Cognition)  WFL  -BP     Personal Safety Interventions  gait belt;nonskid shoes/slippers when out of bed  -BP     Row Name 04/13/21 1050          Pain    Additional Documentation  Pain Scale: Numbers Pre/Post-Treatment (Group)  -BP     Row Name 04/13/21 1050          Pain Scale: Numbers Pre/Post-Treatment    Pretreatment Pain Rating  0/10 - no pain  -BP     Posttreatment Pain Rating  0/10 - no pain  -BP     Row Name 04/13/21 1050          Bed Mobility    Bed Mobility  supine-sit  -BP     Supine-Sit [place] (Bed Mobility)  supervision  -BP     Assistive Device (Bed Mobility)  head of bed elevated  -BP     Row Name 04/13/21 1050          Transfers     Transfers  sit-stand transfer;stand-sit transfer  -BP     Comment (Transfers)  no device required   -BP     Sit-Stand [place] (Transfers)  supervision  -BP     Stand-Sit [place] (Transfers)  supervision  -BP     Row Name 04/13/21 1050          Sit-Stand Transfer    Assistive Device (Sit-Stand Transfers)  -- no device  -BP     Row Name 04/13/21 1050          Stand-Sit Transfer    Assistive Device (Stand-Sit Transfers)  -- no device  -[facility] Name 04/13/21 1050          Gait/Stairs (Locomotion)    [place] Level (Gait)  supervision  -BP     Assistive Device (Gait)  -- no device  -BP     Distance in Feet (Gait)  172  -BP     Pattern (Gait)  swing-through  -BP     Comment (Gait/Stairs)  No loss of balance noted. Patient with one episode of lateral sway but able to recover without assist. No device required. Patient manages directional changes safely.   -BP     Row Name 04/13/21 1050          Balance    Comment, Balance  Static standing balance WFL   -BP     [facility] Name 04/13/21 1050          Plan of Care Review    Plan of Care Reviewed With  patient  -BP     Outcome Summary  PT Evaluation Complete: Patient performs supine to sit transfer with supervision, sit to/from stand transfers with supervision and gait x 172 feet with supervision without use of an AD. No loss of balance noted with mobility. Patient appears to be at baseline. No assistance required throughout evaluation. Patient is oriented x 3 however demonstrates intermittent confusion within conversation, difficult to assess at times as patient is very hard of hearing. Recommend continued mobility with nursing staff. No inpatient skilled PT needs at this time.    -BP     Row Name 04/13/21 1050          Positioning and Restraints    Pre-Treatment Position  in bed  -BP     Post Treatment Position  bed  -BP     In Bed  with nsg;sitting EOB  -BP     [facility] Name 04/13/21 1050          PT Evaluation Complexity    History, PT Evaluation Complexity  1-2  personal factors and/or comorbidities  -BP     Examination of Body Systems (PT Eval Complexity)  1-2 elements  -BP     Clinical Presentation (PT Evaluation Complexity)  stable  -BP     Clinical Decision Making (PT Evaluation Complexity)  low complexity  -BP     Overall Complexity (PT Evaluation Complexity)  low complexity  -BP     Row Name 04/13/21 1050          Therapy Plan Review/Discharge Plan (PT)    Therapy Plan Review (PT)  patient;evaluation/treatment results reviewed;risks/benefits reviewed;participants included;participants voiced agreement with care plan  -BP     Patient/Family Concerns, Anticipated Discharge Disposition (PT)  patient expressed interest in home health PT at discharge  -BP       User Key  (r) = Recorded By, (t) = Taken By, (c) = Cosigned By    Initials Name Provider Type    BP Mann Pandey, PT Physical Therapist          Physical Therapy Education                 Title: PT OT SLP Therapies (Resolved)     Topic: Physical Therapy (Resolved)     Point: Mobility training (Resolved)     Learner Progress:  Not documented in this visit.                            PT Recommendation and Plan  Anticipated Discharge Disposition (PT): home, home with home health  Plan of Care Reviewed With: patient  Outcome Summary: PT Evaluation Complete: Patient performs supine to sit transfer with supervision, sit to/from stand transfers with supervision and gait x 172 feet with supervision without use of an AD. No loss of balance noted with mobility. Patient appears to be at baseline. No assistance required throughout evaluation. Patient is oriented x 3 however demonstrates intermittent confusion within conversation, difficult to assess at times as patient is very hard of hearing. Recommend continued mobility with nursing staff. No inpatient skilled PT needs at this time.      Outcome Measures     Row Name 04/13/21 1050             How much help from another person do you currently need...    Turning from your  back to your side while in flat bed without using bedrails?  3  -BP      Moving from lying on back to sitting on the side of a flat bed without bedrails?  3  -BP      Moving to and from a bed to a chair (including a wheelchair)?  3  -BP      Standing up from a chair using your arms (e.g., wheelchair, bedside chair)?  3  -BP      Climbing 3-5 steps with a railing?  3  -BP      To walk in hospital room?  3  -BP      AM-PAC 6 Clicks Score (PT)  18  -BP         Functional Assessment    Outcome Measure Options  AM-PAC 6 Clicks Basic Mobility (PT)  -BP        User Key  (r) = Recorded By, (t) = Taken By, (c) = Cosigned By    Initials Name Provider Type    Mann Sotomayor, PT Physical Therapist           Time Calculation:   PT Charges     Row Name 04/13/21 1141             Time Calculation    Start Time  1050  -BP      Stop Time  1105  -BP      Time Calculation (min)  15 min  -BP      PT Received On  04/13/21  -BP        User Key  (r) = Recorded By, (t) = Taken By, (c) = Cosigned By    Initials Name Provider Type    Mann Sotomayor, PT Physical Therapist        Therapy Charges for Today     Code Description Service Date Service Provider Modifiers Qty    88785016354 HC PT EVAL LOW COMPLEXITY 1 4/13/2021 Mann Pandey, PT GP 1          PT G-Codes  Outcome Measure Options: AM-PAC 6 Clicks Basic Mobility (PT)  AM-PAC 6 Clicks Score (PT): 18    PT Discharge Summary  Anticipated Discharge Disposition (PT): home, home with home health  Reason for Discharge: At baseline function    Mann Pandey, PT  4/13/2021

## 2021-04-13 NOTE — PLAN OF CARE
Goal Outcome Evaluation:        Outcome Summary: Pt resting comfortably throughout the day.  Added new IV site as previous site was frequently occluded, placed with US guidance.

## 2021-04-13 NOTE — NURSING NOTE
Discharge Planning Assessment  KAVITA Caldwell     Patient Name: Antonette King  MRN: [id number]  Today's Date: 4/13/2021    Admit Date: 4/12/2021    Discharge Needs Assessment     Row Name 04/13/21 1049       Living Environment    Lives With  spouse    Name(s) of Who Lives With Rosetta Escalona    Current Living Arrangements  home/apartment/condo    Duration at Residence  20 years    Potentially Unsafe Housing Conditions  -- none    Primary Care Provided by  self    Provides Primary Care For  no one    Family Caregiver if Needed  spouse    Family Caregiver Names   and daughters    Quality of Family Relationships  unable to assess    Able to Return to Prior Arrangements  yes       Resource/Environmental Concerns    Resource/Environmental Concerns  none    Transportation Concerns  -- none       Transition Planning    Patient/Family Anticipates Transition to  home    Patient/Family Anticipated Services at Transition  none    Transportation Anticipated  family or friend will provide       Discharge Needs Assessment    Readmission Within the Last 30 Days  no previous admission in last 30 days    Current Outpatient/Agency/Support Group  -- none    Equipment Currently Used at Home  none    Concerns to be Addressed  no discharge needs identified    Equipment Needed After Discharge  none    Outpatient/Agency/Support Group Needs  -- none    Discharge Facility/Level of Care Needs  -- none    Provided Post Acute Provider List?  Refused    Refused Provider List Comment  Pt declined    Provided Post Acute Provider Quality & Resource List?  Refused    Refused Quality and Resource List Comment  Pt declined        Discharge Plan     Row Name 04/13/21 1054       Plan    Plan  Discharge home with family    Patient/Family in Agreement with Plan  yes    Plan Comments  I spoke with the patient at bedside with her permission.  I introduced myself and explained my role.  I verified the information on the facesheet and the patients PCP as  Dr. Dai.  She advised that she uses [facility] in [place] and is able to afford and obtain her medications.  She denies having a living will and declined paperwork regarding same.  She lives in a single story home with her  for the past 20 years.  There are no steps to enter the home and she is able to enter and maneuver around her home with no issues.  She advised that she has been packing up her home because she is moving to a smaller home.  She is independent with her ADL's but stated that she is requiring assistance with cleaning her home.  She drives and has a car.  Her  will pick her up at discharge.  She denies having or needing DME, home health or other community resources at discharge. She will go home with her  at discharge and is agreeable to that plan.  She had no further questions or concerns regarding her discharge at this time. TAE signed. CM will continue to follow.          Continued Care and Services - Admitted Since 4/12/2021    Coordination has not been started for this encounter.         Demographic Summary     Row Name 04/13/21 1049       General Information    Admission Type  observation    Arrived From  home    Required Notices Provided  Observation Status Notice    Referral Source  admission list    Reason for Consult  discharge planning    Preferred Language  English     Used During This Interaction  no       Contact Information    Permission Granted to Share Info With          Functional Status    No documentation.       Psychosocial    No documentation.       Abuse/Neglect    No documentation.       Legal    No documentation.       Substance Abuse    No documentation.       Patient Forms    No documentation.           Patrizia Quintero RN

## 2021-04-13 NOTE — ED PROVIDER NOTES
Subjective   History of Present Illness  History of Present Illness    Chief complaint: Hallucinations    Location: Home    Quality/Severity: Patient seeing people in the house that are not there    Timing/Onset/Duration: Noticed over the last 2 days    Modifying Factors: Nothing seems to make it better or worse    Associated Symptoms: Patient unable to give review of systems    Narrative: This 87-year-old white female presents with hallucinations    PCP:Suhas      PCP:Gonzalez Dai MD    Review of Systems   Unable to perform ROS: Mental status change        Medication List      ASK your doctor about these medications    acetaminophen-codeine 300-30 MG per tablet  Commonly known as: TYLENOL #3     allopurinol 300 MG tablet  Commonly known as: ZYLOPRIM     clopidogrel 75 MG tablet  Commonly known as: PLAVIX     hydroCHLOROthiazide 12.5 MG capsule  Commonly known as: MICROZIDE     losartan 100 MG tablet  Commonly known as: COZAAR     metoprolol tartrate 50 MG tablet  Commonly known as: LOPRESSOR     pravastatin 40 MG tablet  Commonly known as: PRAVACHOL     venlafaxine XR 75 MG 24 hr capsule  Commonly known as: EFFEXOR-XR            Past Medical History:   Diagnosis Date   • Breast cancer (CMS/HCC) 2001    left lumpectomy   • Hyperlipidemia    • Hypertension        No Known Allergies    Past Surgical History:   Procedure Laterality Date   • BREAST BIOPSY     • BREAST LUMPECTOMY Left 2011   • CARDIAC SURGERY     • CARPAL TUNNEL RELEASE     • HYSTERECTOMY     • TONSILLECTOMY         Family History   Problem Relation Age of Onset   • Stroke Mother    • Stroke Brother    • Breast cancer Neg Hx        Social History     Socioeconomic History   • Marital status:      Spouse name: Not on file   • Number of children: Not on file   • Years of education: Not on file   • Highest education level: Not on file   Tobacco Use   • Smoking status: Never Smoker   • Smokeless tobacco: Never Used   Vaping Use   • Vaping Use: Never  used   Substance and Sexual Activity   • Alcohol use: No   • Drug use: No   • Sexual activity: Defer           Objective   Physical Exam  Constitutional:       Appearance: Normal appearance. She is not ill-appearing.   HENT:      Head: Normocephalic and atraumatic.      Mouth/Throat:      Mouth: Mucous membranes are moist.   Eyes:      Pupils: Pupils are equal, round, and reactive to light.   Cardiovascular:      Rate and Rhythm: Normal rate and regular rhythm.      Pulses: Normal pulses.      Heart sounds: No murmur heard.   No friction rub. No gallop.    Pulmonary:      Breath sounds: Normal breath sounds.   Abdominal:      General: Abdomen is flat. Bowel sounds are normal. There is no distension.      Palpations: There is no mass.      Tenderness: There is no abdominal tenderness. There is no guarding or rebound.   Musculoskeletal:         General: No swelling or tenderness. Normal range of motion.      Cervical back: Normal range of motion and neck supple. No rigidity or tenderness.   Skin:     General: Skin is warm and dry.      Capillary Refill: Capillary refill takes less than 2 seconds.   Neurological:      General: No focal deficit present.      Mental Status: She is alert.      Cranial Nerves: No cranial nerve deficit.      Motor: No weakness.      Comments: Patient knows she is in the hospital and that she is 87 years of age.   Psychiatric:         Mood and Affect: Mood normal.         Procedures           ED Course  ED Course as of Apr 12 2133   Mon Apr 12, 2021 2017 The urinalysis shows 0-2 red blood cells, 31-50 white blood cells, 3+ bacteria, 13-20 squamous epithelial cells leukocytes are small.  The nitrite is negative.  The urine is being cultured.    [RC]   2033 The laboratory values were reviewed by me.  The troponin is 0.036.  Glucose is 113.  The BUN is 26 and the creatinine is 1.56.  The chloride is 96.  GFR 31.  The white blood cell count 14,000.  The laboratory values are otherwise  unremarkable    [RC]      ED Course User Index  [RC] Gonzalez Lanza MD        20:34 EDT, 04/12/21:  The EKG was obtained at 2026 and read by me at 2028.  It shows a normal sinus rhythm with rate of 77.  There is a normal axis with no hypertrophy.  The GA, QRS, QT intervals are unremarkable.  There is probable left atrial enlargement.  There is no ectopy.  There is no acute ST elevation.  There is borderline diffuse T wave abnormalities.  There is no old EKG for comparison.       21:34 EDT, 04/12/21:  The patient was reassessed.  She has no new complaints.  Her vital signs were reviewed and are stable.  Neurological exam: Unchanged.    21:34 EDT, 04/12/21:  The patient's diagnosis of UTI, hallucinations, and altered mental status was discussed with the patient and her .  The patient will be brought in Dr. Alejandro.  All the patient and 's questions were answered the patient will be admitted in stable condition.    21:34 EDT, 04/12/21:  Dr. Alejandro, on-call for  Has been paged out.  He returned call and will bring the patient into the hospital.                                MDM    Final diagnoses:   Acute UTI   Hallucination   Altered mental status, unspecified altered mental status type       ED Disposition  ED Disposition     None          No follow-up provider specified.       Medication List      No changes were made to your prescriptions during this visit.          Gonzalez Lanza MD  04/12/21 [id number]

## 2021-04-13 NOTE — PLAN OF CARE
Goal Outcome Evaluation:  Plan of Care Reviewed With: patient     Outcome Summary: PT Evaluation Complete: Patient performs supine to sit transfer with supervision, sit to/from stand transfers with supervision and gait x 172 feet with supervision without use of an AD. No loss of balance noted with mobility. Patient appears to be at baseline. No assistance required throughout evaluation. Patient is oriented x 3 however demonstrates intermittent confusion within conversation, difficult to assess at times as patient is very hard of hearing. Recommend continued mobility with nursing staff. No inpatient skilled PT needs at this time.

## 2021-04-13 NOTE — PLAN OF CARE
Problem: Adult Inpatient Plan of Care  Goal: Plan of Care Review  Outcome: Ongoing, Progressing  Flowsheets (Taken 4/13/2021 0335)  Progress: improving  Plan of Care Reviewed With: patient  Outcome Summary: patient pleseantly confused and tolerating IVF.  Patinet without complant and VSS  Goal: Patient-Specific Goal (Individualized)  Outcome: Ongoing, Progressing  Goal: Absence of Hospital-Acquired Illness or Injury  Outcome: Ongoing, Progressing  Intervention: Identify and Manage Fall Risk  Recent Flowsheet Documentation  Taken 4/13/2021 0200 by Pola Abraham RN  Safety Promotion/Fall Prevention:   activity supervised   assistive device/personal items within reach   clutter free environment maintained   fall prevention program maintained   nonskid shoes/slippers when out of bed   safety round/check completed  Taken 4/13/2021 0000 by Pola Abraham RN  Safety Promotion/Fall Prevention:   activity supervised   assistive device/personal items within reach   clutter free environment maintained   fall prevention program maintained   nonskid shoes/slippers when out of bed   safety round/check completed  Taken 4/12/2021 2306 by Pola Abraham RN  Safety Promotion/Fall Prevention:   activity supervised   assistive device/personal items within reach   clutter free environment maintained   fall prevention program maintained   patient off unit   safety round/check completed  Intervention: Prevent Skin Injury  Recent Flowsheet Documentation  Taken 4/13/2021 0200 by Pola Abraham RN  Body Position: position changed independently  Taken 4/13/2021 0000 by Pola Abraham RN  Body Position: position changed independently  Taken 4/12/2021 2306 by Pola Abraham RN  Body Position: position changed independently  Intervention: Prevent and Manage VTE (venous thromboembolism) Risk  Recent Flowsheet Documentation  Taken 4/12/2021 2306 by Pola Abraham RN  VTE Prevention/Management:   bilateral   dorsiflexion/plantar flexion  performed  Goal: Optimal Comfort and Wellbeing  Outcome: Ongoing, Progressing  Intervention: Provide Person-Centered Care  Recent Flowsheet Documentation  Taken 4/12/2021 2306 by Pola Abraham RN  Trust Relationship/Rapport:   care explained   questions answered   questions encouraged  Goal: Readiness for Transition of Care  Outcome: Ongoing, Progressing     Problem: Fall Injury Risk  Goal: Absence of Fall and Fall-Related Injury  Outcome: Ongoing, Progressing  Intervention: Identify and Manage Contributors to Fall Injury Risk  Recent Flowsheet Documentation  Taken 4/13/2021 0200 by Pola Abraham, RN  Self-Care Promotion:   independence encouraged   BADL personal objects within reach  Taken 4/13/2021 0000 by Pola Abraham RN  Self-Care Promotion:   independence encouraged   BADL personal objects within reach  Taken 4/12/2021 2306 by Pola Abraham RN  Self-Care Promotion:   independence encouraged   BADL personal objects within reach  Intervention: Promote Injury-Free Environment  Recent Flowsheet Documentation  Taken 4/13/2021 0200 by Pola Abraham, RN  Safety Promotion/Fall Prevention:   activity supervised   assistive device/personal items within reach   clutter free environment maintained   fall prevention program maintained   nonskid shoes/slippers when out of bed   safety round/check completed  Taken 4/13/2021 0000 by Pola Abraham, RN  Safety Promotion/Fall Prevention:   activity supervised   assistive device/personal items within reach   clutter free environment maintained   fall prevention program maintained   nonskid shoes/slippers when out of bed   safety round/check completed  Taken 4/12/2021 2306 by Pola Abraham, RN  Safety Promotion/Fall Prevention:   activity supervised   assistive device/personal items within reach   clutter free environment maintained   fall prevention program maintained   patient off unit   safety round/check completed   Goal Outcome Evaluation:  Plan of Care Reviewed  With: patient  Progress: improving  Outcome Summary: patient pleseantly confused and tolerating IVF.  Patinet without complant and VSS

## 2021-04-14 ENCOUNTER — APPOINTMENT (OUTPATIENT)
Dept: ULTRASOUND IMAGING | Facility: HOSPITAL | Age: 86
End: 2021-04-14

## 2021-04-14 LAB
ANION GAP SERPL CALCULATED.3IONS-SCNC: 10.4 MMOL/L (ref 5–15)
BACTERIA SPEC AEROBE CULT: NORMAL
BUN SERPL-MCNC: 26 MG/DL (ref 8–23)
BUN/CREAT SERPL: 22.8 (ref 7–25)
CALCIUM SPEC-SCNC: 9.4 MG/DL (ref 8.6–10.5)
CHLORIDE SERPL-SCNC: 102 MMOL/L (ref 98–107)
CO2 SERPL-SCNC: 28.6 MMOL/L (ref 22–29)
CREAT SERPL-MCNC: 1.14 MG/DL (ref 0.57–1)
DEPRECATED RDW RBC AUTO: 45.2 FL (ref 37–54)
ERYTHROCYTE [DISTWIDTH] IN BLOOD BY AUTOMATED COUNT: 13.4 % (ref 12.3–15.4)
GFR SERPL CREATININE-BSD FRML MDRD: 45 ML/MIN/1.73
GLUCOSE SERPL-MCNC: 111 MG/DL (ref 65–99)
HCT VFR BLD AUTO: 38.1 % (ref 34–46.6)
HGB BLD-MCNC: 12.3 G/DL (ref 12–15.9)
MCH RBC QN AUTO: 29.7 PG (ref 26.6–33)
MCHC RBC AUTO-ENTMCNC: 32.3 G/DL (ref 31.5–35.7)
MCV RBC AUTO: 92 FL (ref 79–97)
PLATELET # BLD AUTO: 306 10*3/MM3 (ref 140–450)
PMV BLD AUTO: 10.1 FL (ref 6–12)
POTASSIUM SERPL-SCNC: 3.8 MMOL/L (ref 3.5–5.2)
QT INTERVAL: 424 MS
RBC # BLD AUTO: 4.14 10*6/MM3 (ref 3.77–5.28)
SODIUM SERPL-SCNC: 141 MMOL/L (ref 136–145)
WBC # BLD AUTO: 11.59 10*3/MM3 (ref 3.4–10.8)

## 2021-04-14 PROCEDURE — 25010000002 ENOXAPARIN PER 10 MG: Performed by: FAMILY MEDICINE

## 2021-04-14 PROCEDURE — G0378 HOSPITAL OBSERVATION PER HR: HCPCS

## 2021-04-14 PROCEDURE — 85027 COMPLETE CBC AUTOMATED: CPT | Performed by: FAMILY MEDICINE

## 2021-04-14 PROCEDURE — 80048 BASIC METABOLIC PNL TOTAL CA: CPT | Performed by: FAMILY MEDICINE

## 2021-04-14 PROCEDURE — 96361 HYDRATE IV INFUSION ADD-ON: CPT

## 2021-04-14 PROCEDURE — 96376 TX/PRO/DX INJ SAME DRUG ADON: CPT

## 2021-04-14 PROCEDURE — 76775 US EXAM ABDO BACK WALL LIM: CPT

## 2021-04-14 PROCEDURE — 96372 THER/PROPH/DIAG INJ SC/IM: CPT

## 2021-04-14 PROCEDURE — 25010000002 CEFTRIAXONE SODIUM-DEXTROSE 1-3.74 GM-%(50ML) RECONSTITUTED SOLUTION: Performed by: FAMILY MEDICINE

## 2021-04-14 PROCEDURE — 96366 THER/PROPH/DIAG IV INF ADDON: CPT

## 2021-04-14 PROCEDURE — 25010000002 ONDANSETRON PER 1 MG: Performed by: FAMILY MEDICINE

## 2021-04-14 PROCEDURE — 92523 SPEECH SOUND LANG COMPREHEN: CPT

## 2021-04-14 RX ORDER — SODIUM CHLORIDE 9 MG/ML
75 INJECTION, SOLUTION INTRAVENOUS CONTINUOUS
Status: DISCONTINUED | OUTPATIENT
Start: 2021-04-14 | End: 2021-04-15

## 2021-04-14 RX ADMIN — CEFTRIAXONE 1 G: 1 INJECTION, SOLUTION INTRAVENOUS at 20:10

## 2021-04-14 RX ADMIN — ONDANSETRON 4 MG: 2 INJECTION INTRAMUSCULAR; INTRAVENOUS at 17:40

## 2021-04-14 RX ADMIN — METOPROLOL TARTRATE 50 MG: 50 TABLET, FILM COATED ORAL at 09:13

## 2021-04-14 RX ADMIN — ASPIRIN 81 MG CHEWABLE TABLET 81 MG: 81 TABLET CHEWABLE at 09:13

## 2021-04-14 RX ADMIN — METOPROLOL TARTRATE 50 MG: 50 TABLET, FILM COATED ORAL at 20:10

## 2021-04-14 RX ADMIN — ALLOPURINOL 300 MG: 300 TABLET ORAL at 09:13

## 2021-04-14 RX ADMIN — SODIUM CHLORIDE 75 ML/HR: 9 INJECTION, SOLUTION INTRAVENOUS at 12:41

## 2021-04-14 RX ADMIN — ACETAMINOPHEN 650 MG: 325 TABLET, FILM COATED ORAL at 20:12

## 2021-04-14 RX ADMIN — PRAVASTATIN SODIUM 40 MG: 20 TABLET ORAL at 20:10

## 2021-04-14 RX ADMIN — LOSARTAN POTASSIUM 100 MG: 50 TABLET, FILM COATED ORAL at 09:13

## 2021-04-14 RX ADMIN — VENLAFAXINE HYDROCHLORIDE 75 MG: 37.5 CAPSULE, EXTENDED RELEASE ORAL at 09:13

## 2021-04-14 RX ADMIN — CLOPIDOGREL BISULFATE 75 MG: 75 TABLET ORAL at 09:13

## 2021-04-14 RX ADMIN — Medication 5000 UNITS: at 09:13

## 2021-04-14 RX ADMIN — SODIUM CHLORIDE, PRESERVATIVE FREE 10 ML: 5 INJECTION INTRAVENOUS at 09:13

## 2021-04-14 RX ADMIN — ENOXAPARIN SODIUM 30 MG: 30 INJECTION SUBCUTANEOUS at 21:32

## 2021-04-14 NOTE — PLAN OF CARE
Goal Outcome Evaluation:  Plan of Care Reviewed With: patient  Progress: improving  Outcome Summary: Patient rested well during night. No complaints of pain or discomfort. IV fluids discontinued. VSS.

## 2021-04-14 NOTE — PLAN OF CARE
Goal Outcome Evaluation:  Plan of Care Reviewed With: patient     Outcome Summary: SLP: Completed cognitive communication via the SLUMS. Pt with mild to moderate cognitive communication deficits with a score of 14/30 placing her in the dementia category. Concerns for underlying dementia due to CT scan results and recent confusion/hallucinations upon admission. Pt has demonstrated improvement after treatment of UTI, but continues to demonstrate deficits in working memory/attention, mental math, clock draw and recall of related information from a paragraph. Pt demonstrates limited awareness of deficits and hearing may play a part in some deficits, but clarifications and repetition provided throughout the exam. Recommend: outpatient SLP for cognition, further testing via neuropysch or pysch due to recent hallucinations and confusion to further determine possibility of dementia and/or related illness. Feel pt is okay to go home with family and have them assist or manage higher level household management tasks.

## 2021-04-14 NOTE — THERAPY DISCHARGE NOTE
Acute Care - Speech Language Pathology Initial Evaluation/Discharge   [place]     Patient Name: Antonette King  : 1934  MRN: [id number]  Today's Date: 2021  Onset of Illness/Injury or Date of Surgery: 21     Referring Physician: Dr. Melvin       Admit Date: 2021     Visit Dx:    ICD-10-CM ICD-9-CM   1. Acute UTI  N39.0 599.0   2. Hallucination  R44.3 780.1   3. Altered mental status, unspecified altered mental status type  R41.82 780.97     Patient Active Problem List   Diagnosis   • Acute UTI     Past Medical History:   Diagnosis Date   • Breast cancer (CMS/HCC)     left lumpectomy   • Hyperlipidemia    • Hypertension      Past Surgical History:   Procedure Laterality Date   • BREAST BIOPSY     • BREAST LUMPECTOMY Left    • CARDIAC SURGERY     • CARPAL TUNNEL RELEASE     • HYSTERECTOMY     • TONSILLECTOMY            SLP EVALUATION (last 72 hours)      SLP SLC Evaluation     Row Name 21 0909       Communication Assessment/Intervention    Document Type  discharge evaluation/summary  -AD    Total Evaluation Minutes, SLP  54  -AD    Subjective Information  no complaints  -AD    Patient Observations  alert;cooperative;agree to therapy  -AD    Patient/Family/Caregiver Comments/Observations  Pt seen upright, sitting on the side of the bed. Alert and cooperative. States she is going home today.   -AD    Care Plan Review  evaluation/treatment results reviewed  -AD    Patient Effort  adequate  -AD    Symptoms Noted During/After Treatment  none  -AD       General Information    Patient Profile Reviewed  yes  -AD    Pertinent History Of Current Problem  Pt is an 88 y/o female admitted for confusion and hallucinations. Found to have a urinary tract infection. Encephalopathy has resolved per MD notes. Pt with hx of breast CA w/left lumpectomy , HLD, HTN, cardiac surgery, carpal tunnel release, hysterectomy and tonsillectomy. CT of the head was remarkable for chronic microvascular  ischemic changes as well as chronic lacunar infarct near the right caudate head and focal chronic appearing lucency at left precentral gyrus since 2013.   -AD    Precautions/Limitations, Vision  WFL with corrective lenses  -AD    Precautions/Limitations, Hearing  hearing aid, left;hearing impairment, bilaterally  -AD    Patient Level of Education  High school graduate  -AD    Prior Level of Function-Communication  WFL;other (see comments) no reported deficits per chart/pt  -AD    Plans/Goals Discussed with  patient;agreed upon  -AD    Barriers to Rehab  hearing deficit  -AD    Patient's Goals for Discharge  return to home;take care of myself at home;return to all previous roles/activities  -AD    Family Goals for Discharge  other (see comments) not present  -AD    Standardized Assessment Used  SLUMS  -AD       Pain    Additional Documentation  -- no current pain reported or indicated  -AD       Motor Speech Assessment/Intervention    Motor Speech Function  WFL  -AD    Motor Speech, Comment  Precise articulation with no deficits of motor speech. No s/s of dysarthria or apraxia noted.   -AD       Cursory Voice Assessment/Intervention    Quality and Resonance (Voice)  WFL  -AD       Standardized Tests    Cognitive/Memory Tests  SLUMS: [facility] Mental Status Examination  -AD       SLUMS: [facility] Mental Status Examination    SLUMS Score  13  -AD    SLUMS Range  1-20: Dementia (High school education or higher)  -AD    SLUMS Comments  Pt demonstrates mild to moderate cognitive deficits based on result of the SLUMS. Pt demonstrating greatest deficits with attention with significant talking throughout the evaluation. She demonstrates functional orientation to person, place and situation. Score of 3/3 on SLUMS (day of week, year and state). Immediate memory with recall of 5/5 items after 2 presentations. Delayed recall of 4/5 items with presentation of items noted named such as 'cap, table'. She also  repeated items already named, but did demonstrate awareness of this. Mental math for functional situation. She was unable to complete simple addition and subtraction (0/3). Divergent naming was WFL with 15 animals named in one minute (3/3). Mental Manipulation of recall of a series of numbers presented backwards was 0/2. Pt could perform practice items of 2 digits, but unable to recall for 3 and 4 digit sequences. Clock draw demonstrated 0/4 due to pt listing 2 number sixes on the clock and no distinguishable difference in hand lengths, but on the correct numbers.  Visuospatial skills were intact with identification of shape and size. Decreased recall of related info read from a paragraph/working memory with a score of 4/8 (able to respond to 2/4 questions).   -AD       SLP Clinical Impressions    SLP Diagnosis  Mild to moderate cognitive communication deficit.   -AD    Rehab Potential/Prognosis  fair  -AD    [facility]ventions Met  other (see comments) Would benefit from further therapy at next level of care.   -AD    Functional Impact  restrictions in personal and social life;difficulty completing home management task  -AD       Recommendations    Therapy Frequency ([place] [facility]  evaluation only  -AD    Anticipated Discharge Disposition (SLP)  home with assist;anticipate therapy at next level of care;other (see comments) outpt vs home health  -AD    Demonstrates Need for Referral to Another Service  psychology services;neuropsychology services;other (see comments) for further delineation of deficits given hallucinations  -AD      User Key  (r) = Recorded By, (t) = Taken By, (c) = Cosigned By    Initials Name Effective Dates    AD Tara Lyles, MS CCC-SLP 08/09/20 -            EDUCATION  The patient has been educated in the following areas:   Cognitive Impairment Communication Impairment. Pt demonstrates limited understanding of current deficits.       SLP Recommendation and Plan  SLP  Diagnosis: Mild to moderate cognitive communication deficit.   SLC Criteria for Skilled Therapy Interventions Met: other (see comments) (Would benefit from further therapy at next level of care. )  Anticipated Discharge Disposition (SLP): home with assist, anticipate therapy at next level of care, other (see comments) (outpt vs home health)  Demonstrates Need for Referral to Another Service: psychology services, neuropsychology services, other (see comments) (for further delineation of deficits given hallucinations)       Plan of Care Reviewed With: patient  Outcome Summary: SLP: Completed cognitive communication via the SLUMS. Pt with mild to moderate cognitive communication deficits with a score of 14/30 placing her in the dementia category. Concerns for underlying dementia due to CT scan results and recent confusion/hallucinations upon admission. Pt has demonstrated improvement after treatment of UTI, but continues to demonstrate deficits in working memory/attention, mental math, clock draw and recall of related information from a paragraph. Pt demonstrates limited awareness of deficits and hearing may play a part in some deficits, but clarifications and repetition provided throughout the exam. Recommend: outpatient SLP for cognition, further testing via neuropysch or pysch due to recent hallucinations and confusion to further determine possibility of dementia and/or related illness. Feel pt is okay to go home with family and have them assist or manage higher level household management tasks.              Time Calculation:   Time Calculation- SLP     Row Name 04/14/21 1017             Time Calculation- SLP    SLP Start Time  0815  -AD      SLP Stop Time  0909  -AD      SLP Time Calculation (min)  54 min  -AD      Total Timed Code Minutes- SLP  0 minute(s)  -AD      SLP Non-Billable Time (min)  0 min  -AD      TCU Minutes- SLP  0 min  -AD      SLP Received On  04/14/21  -AD        User Key  (r) = Recorded By, (t)  = Taken By, (c) = Cosigned By    Initials Name Provider Type    AD Tara Lyles, MS CCC-SLP Speech and Language Pathologist          Therapy Charges for Today     Code Description Service Date Service Provider Modifiers Qty    [id number] HC ST EVAL SPEECH AND PROD W LANG  4 4/14/2021 Tara Lyles, MS CCC-SLP GN 1          SLP Discharge Summary  Anticipated Discharge Disposition (SLP): home with assist, anticipate therapy at next level of care, other (see comments) (outpt vs home health)    Tara Lyles, MS CCC-SLP  4/14/2021

## 2021-04-14 NOTE — PLAN OF CARE
Goal Outcome Evaluation:  Plan of Care Reviewed With: patient  Progress: improving  Outcome Summary: Pt up to chair and dressed, anticipating going home today. Explain to patient and to multiple family members that Ms King will not be discharged today. All questions answered. Cont IVFs. PO antibiotics. Tolerating diet.

## 2021-04-14 NOTE — PROGRESS NOTES
"Daily Progress Note:      Chief complaint: Urinary tract infection, hypertension, chronic kidney, metabolic encephalopathy    Subjective: She is without complaints.  No overnight events noted.     LOS: 0 days     Vital Signs  Temp:  [97.1 °F (36.2 °C)-98.3 °F (36.8 °C)] 98.3 °F (36.8 °C)  Heart Rate:  [70-86] 74  Resp:  [16-18] 18  BP: (135-183)/(79-94) 162/80  Oxygen Therapy  SpO2: 96 %  Pulse Oximetry Type: Intermittent  Device (Oxygen Therapy): room air}  Body mass index is 30.9 kg/m².  Flowsheet Rows      First Filed Value   Admission Height  162.6 cm (64\") Documented at 04/12/2021 1814   Admission Weight  77.1 kg (170 lb) Documented at 04/12/2021 1814                   Documented weights    04/12/21 1814 04/14/21 0609   Weight: 77.1 kg (170 lb) 81.6 kg (180 lb)           Patient Vitals for the past 24 hrs:   BP Temp Temp src Pulse Resp SpO2 Weight   04/14/21 0609 162/80 98.3 °F (36.8 °C) Oral 74 18 96 % 81.6 kg (180 lb)   04/13/21 2330 155/84 97.1 °F (36.2 °C) Oral 70 16 94 % --   04/13/21 2144 175/79 -- -- -- -- -- --   04/13/21 2001 (!) 183/90 98.2 °F (36.8 °C) Oral 77 16 97 % --   04/13/21 1505 135/86 97.6 °F (36.4 °C) Oral 70 17 97 % --   04/13/21 0844 (!) 183/94 -- -- 86 -- -- --       81.6 kg (180 lb)      Intake/Output Summary (Last 24 hours) at 4/14/2021 0752  Last data filed at 4/14/2021 0609  Gross per 24 hour   Intake 410 ml   Output 1200 ml   Net -790 ml       Review of Systems   Constitutional: Negative for activity change, appetite change and fatigue.   HENT: Positive for hearing loss. Negative for congestion.    Respiratory: Negative for cough, chest tightness, shortness of breath and wheezing.    Cardiovascular: Negative for chest pain.   Gastrointestinal: Negative for abdominal distention, abdominal pain, diarrhea, nausea and vomiting.   Endocrine: Negative for polyphagia and polyuria.   Genitourinary: Negative for frequency.   Skin: Negative for rash.   Neurological: Negative for " light-headedness.   Hematological: Does not bruise/bleed easily.   Psychiatric/Behavioral: Negative for agitation and behavioral problems.       Physical Exam  Vitals and nursing note reviewed.   Constitutional:       Appearance: She is well-developed.   HENT:      Head: Normocephalic.   Eyes:      Conjunctiva/sclera: Conjunctivae normal.   Neck:      Thyroid: No thyromegaly.      Vascular: No JVD.   Cardiovascular:      Rate and Rhythm: Normal rate and regular rhythm.      Heart sounds: Normal heart sounds. No murmur heard.     Pulmonary:      Effort: Pulmonary effort is normal. No respiratory distress.      Breath sounds: Normal breath sounds. No wheezing or rales.   Abdominal:      General: Bowel sounds are normal. There is no distension.      Palpations: Abdomen is soft.      Tenderness: There is no abdominal tenderness. There is no guarding.   Musculoskeletal:      Cervical back: Normal range of motion.   Skin:     General: Skin is warm and dry.      Findings: No rash.   Neurological:      Mental Status: She is alert.         Medication Review:   I have reviewed the patient's current medication list  Scheduled Meds:allopurinol, 300 mg, Oral, Daily  aspirin, 81 mg, Oral, Daily  cefTRIAXone, 1 g, Intravenous, Q24H  cholecalciferol, 5,000 Units, Oral, Daily  clopidogrel, 75 mg, Oral, Daily  enoxaparin, 30 mg, Subcutaneous, Q24H  losartan, 100 mg, Oral, Daily  metoprolol tartrate, 50 mg, Oral, BID  pravastatin, 40 mg, Oral, Nightly  sodium chloride, 10 mL, Intravenous, Q12H  venlafaxine XR, 75 mg, Oral, Daily      Continuous Infusions:   PRN Meds:.•  acetaminophen **OR** acetaminophen **OR** acetaminophen  •  ondansetron **OR** ondansetron  •  sodium chloride  •  sodium chloride      Labs:  Results from last 7 days   Lab Units 04/14/21  0623 04/13/21  0707 04/12/21  1927   WBC 10*3/mm3 11.59* 10.17 14.19*   HEMOGLOBIN g/dL 12.3 12.4 13.6   HEMATOCRIT % 38.1 39.6 42.2   PLATELETS 10*3/mm3 306 218 331     Results  from last 7 days   Lab Units 04/14/21  0623 04/13/21  1908 04/12/21 1927   SODIUM mmol/L 141 141 136   POTASSIUM mmol/L 3.8 3.6 4.2   CHLORIDE mmol/L 102 100 96*   CO2 mmol/L 28.6 21.0* 28.0   BUN mg/dL 26* 30* 26*   CREATININE mg/dL 1.14* 1.51* 1.56*   CALCIUM mg/dL 9.4 8.9 9.9   BILIRUBIN mg/dL  --   --  0.4   ALK PHOS U/L  --   --  69   ALT (SGPT) U/L  --   --  26   AST (SGOT) U/L  --   --  31   GLUCOSE mg/dL 111* 121* 113*           Results from last 7 days   Lab Units 04/14/21  0623 04/13/21  0707 04/12/21 1927   AST (SGOT) U/L  --   --  31   ALT (SGPT) U/L  --   --  26   PLATELETS 10*3/mm3 306 218 331         Lab Results (last 24 hours)     Procedure Component Value Units Date/Time    Urine Culture - Urine, Urine, Clean Catch [[id number]]  (Normal) Collected: 04/12/21 1926    Specimen: Urine, Clean Catch Updated: 04/14/21 0709     Urine Culture Growth present, too young to evaluate    Basic Metabolic Panel [[id number]]  (Abnormal) Collected: 04/14/21 0623    Specimen: Blood Updated: 04/14/21 0653     Glucose 111 mg/dL      BUN 26 mg/dL      Creatinine 1.14 mg/dL      Sodium 141 mmol/L      Potassium 3.8 mmol/L      Chloride 102 mmol/L      CO2 28.6 mmol/L      Calcium 9.4 mg/dL      eGFR Non African Amer 45 mL/min/1.73      BUN/Creatinine Ratio 22.8     Anion Gap 10.4 mmol/L     Narrative:      GFR Normal >60  Chronic Kidney Disease <60  Kidney Failure <15      CBC (No Diff) [[id number]]  (Abnormal) Collected: 04/14/21 0623    Specimen: Blood Updated: 04/14/21 0637     WBC 11.59 10*3/mm3      RBC 4.14 10*6/mm3      Hemoglobin 12.3 g/dL      Hematocrit 38.1 %      MCV 92.0 fL      MCH 29.7 pg      MCHC 32.3 g/dL      RDW 13.4 %      RDW-SD 45.2 fl      MPV 10.1 fL      Platelets 306 10*3/mm3     Basic Metabolic Panel [[id number]]  (Abnormal) Collected: 04/13/21 1908    Specimen: Blood Updated: 04/13/21 1924     Glucose 121 mg/dL      BUN 30 mg/dL      Creatinine 1.51 mg/dL      Sodium 141 mmol/L      Potassium  3.6 mmol/L      Comment: Slight hemolysis detected by analyzer. Results may be affected.        Chloride 100 mmol/L      CO2 21.0 mmol/L      Calcium 8.9 mg/dL      eGFR Non African Amer 33 mL/min/1.73      BUN/Creatinine Ratio 19.9     Anion Gap 20.0 mmol/L     Narrative:      GFR Normal >60  Chronic Kidney Disease <60  Kidney Failure <15          Results from last 7 days   Lab Units 04/13/21  0708 04/12/21 1927   TROPONIN T ng/mL 0.024 0.036*                                 No results found for: POCGLU      Results from last 7 days   Lab Units 04/12/21 1926   URINECX  Growth present, too young to evaluate     Results from last 7 days   Lab Units 04/12/21 1926   NITRITE UA  Negative   WBC UA /HPF 31-50*   BACTERIA UA /HPF 3+*   SQUAM EPITHEL UA /HPF 13-20*   URINECX  Growth present, too young to evaluate             Radiology:  Imaging Results (Last 24 Hours)     ** No results found for the last 24 hours. **          Cardiology:  ECG/EMG Results (last 24 hours)     Procedure Component Value Units Date/Time    ECG 12 Lead [[id number]] Collected: 04/12/21 2026     Updated: 04/14/21 0743     QT Interval 424 ms     Narrative:      HEART RATE= 77  bpm  RR Interval= 776  ms  WA Interval= 166  ms  P Horizontal Axis= -13  deg  P Front Axis= 60  deg  QRSD Interval= 96  ms  QT Interval= 424  ms  QRS Axis= 35  deg  T Wave Axis= 51  deg  - BORDERLINE ECG -  Sinus rhythm  Probable left atrial enlargement  Borderline T wave abnormalities  NO PRIOR TRACING AVAILABLE FOR COMPARISON  Electronically Signed By: Sonal James ([facility]) 14-Apr-2021 07:41:21  Date and Time of Study: 2021-04-12 20:26:22          I have reviewed recent labs results and consult notes.  Parts of this note may have been copied and pasted but patient was examined and interviewed by me today    Assessment and Plan:    1.  Urinary tract infection improved we will change to p.o. antibiotics discharge home today urine culture not available yet    2.  Acute  kidney injury chronic kidney stage III I am not been able to get her baseline labs from her primary care physician's office but her renal functions improved with hydration    3.  Metabolic encephalopathy resolved    4.  Hypertension poorly controlled patient very anxious she will follow-up with her primary care physician on discharge      Much of this encounter note is an electronic transcription/translation of spoken language to printed text using Dragon Software

## 2021-04-14 NOTE — NURSING NOTE
Continued Stay Note  KAVITA Caldwell     Patient Name: Antonette King  MRN: [id number]  Today's Date: 4/14/2021    Admit Date: 4/12/2021    Discharge Plan     Row Name 04/14/21 1107       Plan    Plan  Home with home health for speech.    Plan Comments  I spoke with the patient with her permission.  She was sitting up in the chair awaiting her discharge.  We discussed her recommended speech therapy and she advised that she would like [facility] her [facility] for home health agencies.  I spoke with Roya from [place] and she has accepted the patient.  Pt advised and agreeable to discharge home with [facility] for speech.  CM will continue to follow        Discharge Codes    No documentation.             Patrizia Quintero RN

## 2021-04-15 ENCOUNTER — APPOINTMENT (OUTPATIENT)
Dept: MRI IMAGING | Facility: HOSPITAL | Age: 86
End: 2021-04-15

## 2021-04-15 LAB
ANION GAP SERPL CALCULATED.3IONS-SCNC: 9.2 MMOL/L (ref 5–15)
BILIRUB UR QL STRIP: NEGATIVE
BUN SERPL-MCNC: 21 MG/DL (ref 8–23)
BUN/CREAT SERPL: 22.3 (ref 7–25)
CALCIUM SPEC-SCNC: 9.4 MG/DL (ref 8.6–10.5)
CHLORIDE SERPL-SCNC: 103 MMOL/L (ref 98–107)
CLARITY UR: CLEAR
CO2 SERPL-SCNC: 26.8 MMOL/L (ref 22–29)
COLOR UR: YELLOW
CREAT SERPL-MCNC: 0.94 MG/DL (ref 0.57–1)
GFR SERPL CREATININE-BSD FRML MDRD: 56 ML/MIN/1.73
GLUCOSE SERPL-MCNC: 101 MG/DL (ref 65–99)
GLUCOSE UR STRIP-MCNC: NEGATIVE MG/DL
HGB UR QL STRIP.AUTO: NEGATIVE
KETONES UR QL STRIP: NEGATIVE
LEUKOCYTE ESTERASE UR QL STRIP.AUTO: NEGATIVE
NITRITE UR QL STRIP: NEGATIVE
PH UR STRIP.AUTO: 6.5 [PH] (ref 4.5–8)
POTASSIUM SERPL-SCNC: 3.7 MMOL/L (ref 3.5–5.2)
PROT UR QL STRIP: NEGATIVE
SODIUM SERPL-SCNC: 139 MMOL/L (ref 136–145)
SP GR UR STRIP: 1.01 (ref 1–1.03)
UROBILINOGEN UR QL STRIP: NORMAL

## 2021-04-15 PROCEDURE — 81003 URINALYSIS AUTO W/O SCOPE: CPT | Performed by: FAMILY MEDICINE

## 2021-04-15 PROCEDURE — 96376 TX/PRO/DX INJ SAME DRUG ADON: CPT

## 2021-04-15 PROCEDURE — 80048 BASIC METABOLIC PNL TOTAL CA: CPT | Performed by: FAMILY MEDICINE

## 2021-04-15 PROCEDURE — 25010000002 HYDRALAZINE PER 20 MG: Performed by: FAMILY MEDICINE

## 2021-04-15 PROCEDURE — 96361 HYDRATE IV INFUSION ADD-ON: CPT

## 2021-04-15 PROCEDURE — 96366 THER/PROPH/DIAG IV INF ADDON: CPT

## 2021-04-15 PROCEDURE — 96375 TX/PRO/DX INJ NEW DRUG ADDON: CPT

## 2021-04-15 PROCEDURE — G0378 HOSPITAL OBSERVATION PER HR: HCPCS

## 2021-04-15 PROCEDURE — 96372 THER/PROPH/DIAG INJ SC/IM: CPT

## 2021-04-15 PROCEDURE — 70551 MRI BRAIN STEM W/O DYE: CPT

## 2021-04-15 PROCEDURE — 25010000002 ENOXAPARIN PER 10 MG: Performed by: FAMILY MEDICINE

## 2021-04-15 PROCEDURE — 25010000002 CEFTRIAXONE SODIUM-DEXTROSE 1-3.74 GM-%(50ML) RECONSTITUTED SOLUTION: Performed by: FAMILY MEDICINE

## 2021-04-15 RX ORDER — BISACODYL 10 MG
10 SUPPOSITORY, RECTAL RECTAL ONCE
Status: COMPLETED | OUTPATIENT
Start: 2021-04-15 | End: 2021-04-15

## 2021-04-15 RX ORDER — HYDRALAZINE HYDROCHLORIDE 20 MG/ML
10 INJECTION INTRAMUSCULAR; INTRAVENOUS EVERY 4 HOURS PRN
Status: DISCONTINUED | OUTPATIENT
Start: 2021-04-15 | End: 2021-04-16 | Stop reason: HOSPADM

## 2021-04-15 RX ORDER — HYDRALAZINE HYDROCHLORIDE 25 MG/1
25 TABLET, FILM COATED ORAL EVERY 8 HOURS SCHEDULED
Status: DISCONTINUED | OUTPATIENT
Start: 2021-04-15 | End: 2021-04-16 | Stop reason: HOSPADM

## 2021-04-15 RX ORDER — BISACODYL 10 MG
10 SUPPOSITORY, RECTAL RECTAL DAILY
Status: DISCONTINUED | OUTPATIENT
Start: 2021-04-15 | End: 2021-04-16 | Stop reason: HOSPADM

## 2021-04-15 RX ADMIN — VENLAFAXINE HYDROCHLORIDE 75 MG: 37.5 CAPSULE, EXTENDED RELEASE ORAL at 08:57

## 2021-04-15 RX ADMIN — ACETAMINOPHEN 650 MG: 325 TABLET, FILM COATED ORAL at 16:38

## 2021-04-15 RX ADMIN — METOPROLOL TARTRATE 50 MG: 50 TABLET, FILM COATED ORAL at 08:57

## 2021-04-15 RX ADMIN — CEFTRIAXONE 1 G: 1 INJECTION, SOLUTION INTRAVENOUS at 20:15

## 2021-04-15 RX ADMIN — Medication 5000 UNITS: at 08:57

## 2021-04-15 RX ADMIN — HYDRALAZINE HYDROCHLORIDE 25 MG: 25 TABLET, FILM COATED ORAL at 15:21

## 2021-04-15 RX ADMIN — BISACODYL 10 MG: 10 SUPPOSITORY RECTAL at 08:57

## 2021-04-15 RX ADMIN — LOSARTAN POTASSIUM 100 MG: 50 TABLET, FILM COATED ORAL at 08:57

## 2021-04-15 RX ADMIN — HYDRALAZINE HYDROCHLORIDE 25 MG: 25 TABLET, FILM COATED ORAL at 23:26

## 2021-04-15 RX ADMIN — METOPROLOL TARTRATE 50 MG: 50 TABLET, FILM COATED ORAL at 20:15

## 2021-04-15 RX ADMIN — HYDRALAZINE HYDROCHLORIDE 10 MG: 20 INJECTION INTRAMUSCULAR; INTRAVENOUS at 11:36

## 2021-04-15 RX ADMIN — ENOXAPARIN SODIUM 30 MG: 30 INJECTION SUBCUTANEOUS at 23:26

## 2021-04-15 RX ADMIN — ALLOPURINOL 300 MG: 300 TABLET ORAL at 08:57

## 2021-04-15 RX ADMIN — HYDRALAZINE HYDROCHLORIDE 10 MG: 20 INJECTION INTRAMUSCULAR; INTRAVENOUS at 15:24

## 2021-04-15 RX ADMIN — SODIUM CHLORIDE, PRESERVATIVE FREE 10 ML: 5 INJECTION INTRAVENOUS at 20:16

## 2021-04-15 RX ADMIN — SODIUM CHLORIDE 75 ML/HR: 9 INJECTION, SOLUTION INTRAVENOUS at 07:13

## 2021-04-15 RX ADMIN — PRAVASTATIN SODIUM 40 MG: 20 TABLET ORAL at 20:15

## 2021-04-15 RX ADMIN — ASPIRIN 81 MG CHEWABLE TABLET 81 MG: 81 TABLET CHEWABLE at 08:57

## 2021-04-15 RX ADMIN — HYDRALAZINE HYDROCHLORIDE 25 MG: 25 TABLET, FILM COATED ORAL at 08:57

## 2021-04-15 RX ADMIN — CLOPIDOGREL BISULFATE 75 MG: 75 TABLET ORAL at 08:57

## 2021-04-15 NOTE — NURSING NOTE
Notified Dr. Alejandro of patients elevated blood pressure. MD stated to let patient rest tonight and not to re-check blood pressure until in the morning.

## 2021-04-15 NOTE — PLAN OF CARE
Goal Outcome Evaluation:  Plan of Care Reviewed With: patient  Progress: improving  Outcome Summary: Patient ambulated in room and sat in chair today. Voiding without difficuylty. Stop IVFs. Tolerating regular diet. Back pain relieved by tylenol. MRI today. Blood pressure elevated. Scheduled and PRN hydralazine have helped decrease blood pressure. Intermittent confusion at baseline. Patient was anxious today but easily reassured and calmed down.

## 2021-04-15 NOTE — PLAN OF CARE
Goal Outcome Evaluation:  Plan of Care Reviewed With: patient  Progress: improving  Outcome Summary: Patient rested well during the night. No signs or symptoms of discomfort. No shortness of breath noted.

## 2021-04-15 NOTE — PROGRESS NOTES
"Daily Progress Note:      Chief complaint: Urinary tract infection, hypertension, chronic kidney, metabolic encephalopathy    Subjective: Had some lower abdominal cramping and nausea.  No vomiting she feels better this morning.  She has not had a bowel movement for several days     LOS: 0 days     Vital Signs  Temp:  [98 °F (36.7 °C)-98.8 °F (37.1 °C)] 98 °F (36.7 °C)  Heart Rate:  [66-74] 72  Resp:  [16-18] 18  BP: (172-193)/(78-93) 190/92  Oxygen Therapy  SpO2: 97 %  Pulse Oximetry Type: Intermittent  Device (Oxygen Therapy): room air}  Body mass index is 30.9 kg/m².  Flowsheet Rows      First Filed Value   Admission Height  162.6 cm (64\") Documented at 04/12/2021 1814   Admission Weight  77.1 kg (170 lb) Documented at 04/12/2021 1814                   Documented weights    04/12/21 1814 04/14/21 0609   Weight: 77.1 kg (170 lb) 81.6 kg (180 lb)           Patient Vitals for the past 24 hrs:   BP Temp Temp src Pulse Resp SpO2   04/15/21 0633 -- 98 °F (36.7 °C) Oral 72 18 97 %   04/14/21 2113 (!) 190/92 -- -- -- -- --   04/14/21 2105 (!) 190/89 98.2 °F (36.8 °C) Oral -- -- --   04/14/21 1951 (!) 187/93 98.8 °F (37.1 °C) Oral 74 18 96 %   04/14/21 1525 172/78 -- -- -- -- --   04/14/21 1519 (!) 193/81 98 °F (36.7 °C) Oral 66 16 96 %       81.6 kg (180 lb)      Intake/Output Summary (Last 24 hours) at 4/15/2021 0722  Last data filed at 4/15/2021 0713  Gross per 24 hour   Intake 2700 ml   Output 1100 ml   Net 1600 ml       Review of Systems   Constitutional: Negative for activity change, appetite change and fatigue.   HENT: Positive for hearing loss. Negative for congestion.    Respiratory: Negative for cough, chest tightness, shortness of breath and wheezing.    Cardiovascular: Negative for chest pain.   Gastrointestinal: Positive for abdominal pain and constipation. Negative for abdominal distention, diarrhea, nausea and vomiting.   Endocrine: Negative for polyphagia and polyuria.   Genitourinary: Negative for " frequency.   Skin: Negative for rash.   Neurological: Negative for light-headedness.   Hematological: Does not bruise/bleed easily.   Psychiatric/Behavioral: Negative for agitation and behavioral problems.       Physical Exam  Vitals and nursing note reviewed.   Constitutional:       Appearance: She is well-developed.   HENT:      Head: Normocephalic.   Eyes:      Conjunctiva/sclera: Conjunctivae normal.   Neck:      Thyroid: No thyromegaly.      Vascular: No JVD.   Cardiovascular:      Rate and Rhythm: Normal rate and regular rhythm.      Heart sounds: Normal heart sounds. No murmur heard.     Pulmonary:      Effort: Pulmonary effort is normal. No respiratory distress.      Breath sounds: Normal breath sounds. No wheezing or rales.   Abdominal:      General: Bowel sounds are normal. There is no distension.      Palpations: Abdomen is soft.      Tenderness: There is no abdominal tenderness. There is no guarding.   Musculoskeletal:      Cervical back: Normal range of motion.   Skin:     General: Skin is warm and dry.      Findings: No rash.   Neurological:      Mental Status: She is alert.         Medication Review:   I have reviewed the patient's current medication list  Scheduled Meds:allopurinol, 300 mg, Oral, Daily  aspirin, 81 mg, Oral, Daily  bisacodyl, 10 mg, Rectal, Once  cefTRIAXone, 1 g, Intravenous, Q24H  cholecalciferol, 5,000 Units, Oral, Daily  clopidogrel, 75 mg, Oral, Daily  enoxaparin, 30 mg, Subcutaneous, Q24H  losartan, 100 mg, Oral, Daily  metoprolol tartrate, 50 mg, Oral, BID  pravastatin, 40 mg, Oral, Nightly  sodium chloride, 10 mL, Intravenous, Q12H  venlafaxine XR, 75 mg, Oral, Daily      Continuous Infusions:   PRN Meds:.•  acetaminophen **OR** acetaminophen **OR** acetaminophen  •  ondansetron **OR** ondansetron  •  sodium chloride  •  sodium chloride      Labs:  Results from last 7 days   Lab Units 04/14/21  0623 04/13/21  0707 04/12/21  1927   WBC 10*3/mm3 11.59* 10.17 14.19*   HEMOGLOBIN  g/dL 12.3 12.4 13.6   HEMATOCRIT % 38.1 39.6 42.2   PLATELETS 10*3/mm3 306 218 331     Results from last 7 days   Lab Units 04/15/21  0415 04/14/21  0623 04/13/21  1908 04/12/21 1927   SODIUM mmol/L 139 141 141 136   POTASSIUM mmol/L 3.7 3.8 3.6 4.2   CHLORIDE mmol/L 103 102 100 96*   CO2 mmol/L 26.8 28.6 21.0* 28.0   BUN mg/dL 21 26* 30* 26*   CREATININE mg/dL 0.94 1.14* 1.51* 1.56*   CALCIUM mg/dL 9.4 9.4 8.9 9.9   BILIRUBIN mg/dL  --   --   --  0.4   ALK PHOS U/L  --   --   --  69   ALT (SGPT) U/L  --   --   --  26   AST (SGOT) U/L  --   --   --  31   GLUCOSE mg/dL 101* 111* 121* 113*           Results from last 7 days   Lab Units 04/14/21  0623 04/13/21  0707 04/12/21 1927   AST (SGOT) U/L  --   --  31   ALT (SGPT) U/L  --   --  26   PLATELETS 10*3/mm3 306 218 331         Lab Results (last 24 hours)     Procedure Component Value Units Date/Time    Urinalysis With Culture If Indicated - Urine, Catheter [[id number]]  (Normal) Collected: 04/15/21 0626    Specimen: Urine, Catheter Updated: 04/15/21 0631     Color, UA Yellow     Appearance, UA Clear     pH, UA 6.5     Specific Gravity, UA 1.015     Glucose, UA Negative     Ketones, UA Negative     Bilirubin, UA Negative     Blood, UA Negative     Protein, UA Negative     Leuk Esterase, UA Negative     Nitrite, UA Negative     Urobilinogen, UA 0.2 E.U./dL    Narrative:      Urine microscopic not indicated.    Basic Metabolic Panel [[id number]]  (Abnormal) Collected: 04/15/21 0415    Specimen: Blood Updated: 04/15/21 0455     Glucose 101 mg/dL      BUN 21 mg/dL      Creatinine 0.94 mg/dL      Sodium 139 mmol/L      Potassium 3.7 mmol/L      Chloride 103 mmol/L      CO2 26.8 mmol/L      Calcium 9.4 mg/dL      eGFR Non African Amer 56 mL/min/1.73      BUN/Creatinine Ratio 22.3     Anion Gap 9.2 mmol/L     Narrative:      GFR Normal >60  Chronic Kidney Disease <60  Kidney Failure <15          Results from last 7 days   Lab Units 04/13/21  0708 04/12/21  1927   TROPONIN T  ng/mL 0.024 0.036*                                 No results found for: POCGLU      Results from last 7 days   Lab Units 04/12/21 1926   URINECX  >100,000 CFU/mL Normal Urogenital Tessa     Results from last 7 days   Lab Units 04/15/21  0626 04/12/21 1926   NITRITE UA  Negative Negative   WBC UA /HPF  --  31-50*   BACTERIA UA /HPF  --  3+*   SQUAM EPITHEL UA /HPF  --  13-20*   URINECX   --  >100,000 CFU/mL Normal Urogenital Tessa             Radiology:  Imaging Results (Last 24 Hours)     Procedure Component Value Units Date/Time    US Renal Bilateral [[id number]] Collected: 04/14/21 1448     Updated: 04/14/21 1451    Narrative:      US Renal Comp    INDICATION:   87-year-old woman with elevated BUN and creatinine. Recent urinary tract infection.    COMPARISON:   None available.    FINDINGS:   KIDNEYS:  The right kidney measures 10.6 x 5.4 x 4.5 cm. The right renal cortical echogenicity and cortical thickness is normal. The cortex measures 15 mm. There are 3 cysts within the right kidney. In the upper pole there is a simple cyst measuring 3.8  x 3.0 x 3.9 cm. In the mid kidney there is a 2.5 x 2.5 x 2.3 cm simple cyst. There is an additional medial mid kidney simple cyst measuring 1.9 x 1.8 x 2.1 cm. No solid mass or stone. No obstruction.    The left kidney measures 11.8 x 5.6 x 4.4 cm. The left renal cortical echogenicity is normal. The left renal cortical thickness is normal measuring 21 mm. There is a tiny cyst in the upper pole measuring 9 mm and an additional cyst in the lower pole  measuring 1.1 x 1.1 x 1.3 cm. No mass, stone or obstruction    URINARY BLADDER:  No bladder wall thickening. No bladder stone      Impression:      The kidneys are normal in size with normal renal cortical thickness and echogenicity. There are bilateral simple cysts. These require no additional follow-up. No solid mass, stone or obstruction.    Signer Name: Luz Banks MD   Signed: 4/14/2021 2:48 PM   Workstation Name:  [id number]    [facility]          Cardiology:  ECG/EMG Results (last 24 hours)     Procedure Component Value Units Date/Time    ECG 12 Lead [[id number]] Collected: 04/12/21 2026     Updated: 04/14/21 0743     QT Interval 424 ms     Narrative:      HEART RATE= 77  bpm  RR Interval= 776  ms  NH Interval= 166  ms  P Horizontal Axis= -13  deg  P Front Axis= 60  deg  QRSD Interval= 96  ms  QT Interval= 424  ms  QRS Axis= 35  deg  T Wave Axis= 51  deg  - BORDERLINE ECG -  Sinus rhythm  Probable left atrial enlargement  Borderline T wave abnormalities  NO PRIOR TRACING AVAILABLE FOR COMPARISON  Electronically Signed By: Sonal James ([facility]) 14-Apr-2021 07:41:21  Date and Time of Study: 2021-04-12 20:26:22          I have reviewed recent labs results and consult notes.  Parts of this note may have been copied and pasted but patient was examined and interviewed by me today    Assessment and Plan:    1.  Urinary tract infection improved cultures remain negative will change to p.o. antibiotics    2.  Acute kidney injury likely due to decreased p.o. intake labs from primary care physician office reviewed and she has no evidence of prior kidney disease    3.  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  Will check MRI    4.  Hypertension poorly controlled we will adjust medications      Much of this encounter note is an electronic transcription/translation of spoken language to printed text using Dragon Software

## 2021-04-15 NOTE — NURSING NOTE
Continued Stay Note  KAVITA Caldwell     Patient Name: Antonette King  MRN: [id number]  Today's Date: 4/15/2021    Admit Date: 4/12/2021    Discharge Plan     Row Name 04/15/21 1630       Plan    Plan  plan home with [facility]  for speech/PT    Plan Comments  Follow up with Krish HAND regarding MD plan for dc. CM will continue to follow.        Discharge Codes    No documentation.             Po Garcia RN

## 2021-04-16 VITALS
DIASTOLIC BLOOD PRESSURE: 81 MMHG | OXYGEN SATURATION: 95 % | HEART RATE: 99 BPM | RESPIRATION RATE: 18 BRPM | SYSTOLIC BLOOD PRESSURE: 147 MMHG | HEIGHT: 64 IN | WEIGHT: 181.6 LBS | BODY MASS INDEX: 31 KG/M2 | TEMPERATURE: 97.7 F

## 2021-04-16 PROBLEM — E78.5 HYPERLIPIDEMIA: Status: ACTIVE | Noted: 2021-04-16

## 2021-04-16 PROBLEM — I10 HYPERTENSION: Status: ACTIVE | Noted: 2021-04-16

## 2021-04-16 PROBLEM — F02.80 DEMENTIA OF THE ALZHEIMER'S TYPE WITH LATE ONSET WITHOUT BEHAVIORAL DISTURBANCE: Status: ACTIVE | Noted: 2021-04-16

## 2021-04-16 PROBLEM — G30.1 DEMENTIA OF THE ALZHEIMER'S TYPE WITH LATE ONSET WITHOUT BEHAVIORAL DISTURBANCE: Status: ACTIVE | Noted: 2021-04-16

## 2021-04-16 LAB
ALBUMIN SERPL-MCNC: 3.3 G/DL (ref 3.5–5.2)
ALBUMIN/GLOB SERPL: 0.9 G/DL
ALP SERPL-CCNC: 62 U/L (ref 39–117)
ALT SERPL W P-5'-P-CCNC: 21 U/L (ref 1–33)
ANION GAP SERPL CALCULATED.3IONS-SCNC: 10.6 MMOL/L (ref 5–15)
AST SERPL-CCNC: 20 U/L (ref 1–32)
BILIRUB SERPL-MCNC: 0.3 MG/DL (ref 0–1.2)
BUN SERPL-MCNC: 15 MG/DL (ref 8–23)
BUN/CREAT SERPL: 17.6 (ref 7–25)
CALCIUM SPEC-SCNC: 9.9 MG/DL (ref 8.6–10.5)
CHLORIDE SERPL-SCNC: 103 MMOL/L (ref 98–107)
CO2 SERPL-SCNC: 26.4 MMOL/L (ref 22–29)
CREAT SERPL-MCNC: 0.85 MG/DL (ref 0.57–1)
DEPRECATED RDW RBC AUTO: 45.7 FL (ref 37–54)
ERYTHROCYTE [DISTWIDTH] IN BLOOD BY AUTOMATED COUNT: 13.6 % (ref 12.3–15.4)
GFR SERPL CREATININE-BSD FRML MDRD: 63 ML/MIN/1.73
GLOBULIN UR ELPH-MCNC: 3.5 GM/DL
GLUCOSE SERPL-MCNC: 105 MG/DL (ref 65–99)
HCT VFR BLD AUTO: 37.2 % (ref 34–46.6)
HGB BLD-MCNC: 11.9 G/DL (ref 12–15.9)
MCH RBC QN AUTO: 29.5 PG (ref 26.6–33)
MCHC RBC AUTO-ENTMCNC: 32 G/DL (ref 31.5–35.7)
MCV RBC AUTO: 92.3 FL (ref 79–97)
PLATELET # BLD AUTO: 309 10*3/MM3 (ref 140–450)
PMV BLD AUTO: 10.1 FL (ref 6–12)
POTASSIUM SERPL-SCNC: 3.9 MMOL/L (ref 3.5–5.2)
PROT SERPL-MCNC: 6.8 G/DL (ref 6–8.5)
RBC # BLD AUTO: 4.03 10*6/MM3 (ref 3.77–5.28)
SODIUM SERPL-SCNC: 140 MMOL/L (ref 136–145)
WBC # BLD AUTO: 11.4 10*3/MM3 (ref 3.4–10.8)

## 2021-04-16 PROCEDURE — 80053 COMPREHEN METABOLIC PANEL: CPT | Performed by: FAMILY MEDICINE

## 2021-04-16 PROCEDURE — 85027 COMPLETE CBC AUTOMATED: CPT | Performed by: FAMILY MEDICINE

## 2021-04-16 PROCEDURE — G0378 HOSPITAL OBSERVATION PER HR: HCPCS

## 2021-04-16 RX ORDER — CEFUROXIME AXETIL 250 MG/1
250 TABLET ORAL 2 TIMES DAILY
Qty: 10 TABLET | Refills: 0 | Status: SHIPPED | OUTPATIENT
Start: 2021-04-16 | End: 2022-09-02

## 2021-04-16 RX ORDER — HYDRALAZINE HYDROCHLORIDE 25 MG/1
25 TABLET, FILM COATED ORAL EVERY 8 HOURS SCHEDULED
Qty: 90 TABLET | Refills: 0 | Status: SHIPPED | OUTPATIENT
Start: 2021-04-16

## 2021-04-16 RX ADMIN — Medication 5000 UNITS: at 08:13

## 2021-04-16 RX ADMIN — METOPROLOL TARTRATE 50 MG: 50 TABLET, FILM COATED ORAL at 08:13

## 2021-04-16 RX ADMIN — HYDRALAZINE HYDROCHLORIDE 25 MG: 25 TABLET, FILM COATED ORAL at 06:33

## 2021-04-16 RX ADMIN — LOSARTAN POTASSIUM 100 MG: 50 TABLET, FILM COATED ORAL at 08:13

## 2021-04-16 RX ADMIN — VENLAFAXINE HYDROCHLORIDE 75 MG: 37.5 CAPSULE, EXTENDED RELEASE ORAL at 08:13

## 2021-04-16 RX ADMIN — ALLOPURINOL 300 MG: 300 TABLET ORAL at 08:13

## 2021-04-16 RX ADMIN — CLOPIDOGREL BISULFATE 75 MG: 75 TABLET ORAL at 08:13

## 2021-04-16 RX ADMIN — SODIUM CHLORIDE, PRESERVATIVE FREE 10 ML: 5 INJECTION INTRAVENOUS at 08:14

## 2021-04-16 RX ADMIN — ASPIRIN 81 MG CHEWABLE TABLET 81 MG: 81 TABLET CHEWABLE at 08:13

## 2021-04-16 NOTE — CASE MANAGEMENT/SOCIAL WORK
Case Management Discharge Note      Final Note: dc home with [facility]    Provided Post Acute Provider List?: Refused  Refused Provider List Comment: Pt declined  Provided Post Acute Provider Quality & Resource List?: Refused  Refused Quality and Resource List Comment: Pt declined    Selected Continued Care - Discharged on 4/16/2021 Admission date: 4/12/2021 - Discharge disposition: Home or Self Care    Destination    No services have been selected for the patient.              Durable Medical Equipment    No services have been selected for the patient.              Dialysis/Infusion    No services have been selected for the patient.              Home Medical Care Coordination complete    Service Provider Selected Services Address Phone Fax Patient Preferred    [facility] [place]  [facility] [address] [place] [id number] [phone] --          Therapy    No services have been selected for the patient.              Community Resources    No services have been selected for the patient.                       Final Discharge Disposition Code: 06 - home with home health care

## 2021-04-16 NOTE — PLAN OF CARE
Goal Outcome Evaluation:  Plan of Care Reviewed With: patient  Progress: improving  Outcome Summary: VSS. Blood pressure improved. Rested well during the night. No complaints of pain or discomfort. Had positive results from prune juice. Patient appears in better mood and states that she does feel better.

## 2021-04-16 NOTE — PROGRESS NOTES
"Daily Progress Note:      Chief complaint: Urinary tract infection, hypertension, chronic kidney, metabolic encephalopathy    Subjective: Abdominal pain is resolved with a good bowel movement she feels much better blood pressure is also doing better     LOS: 0 days     Vital Signs  Temp:  [97.5 °F (36.4 °C)-98.9 °F (37.2 °C)] 97.7 °F (36.5 °C)  Heart Rate:  [69-91] 79  Resp:  [18-20] 18  BP: (124-191)/(64-91) 159/85  Oxygen Therapy  SpO2: 95 %  Pulse Oximetry Type: Intermittent  Device (Oxygen Therapy): room air}  Body mass index is 30.9 kg/m².  Flowsheet Rows      First Filed Value   Admission Height  162.6 cm (64\") Documented at 04/12/2021 1814   Admission Weight  77.1 kg (170 lb) Documented at 04/12/2021 1814                   Documented weights    04/12/21 1814 04/14/21 0609   Weight: 77.1 kg (170 lb) 81.6 kg (180 lb)           Patient Vitals for the past 24 hrs:   BP Temp Temp src Pulse Resp SpO2   04/16/21 0541 159/85 97.7 °F (36.5 °C) Oral 79 18 95 %   04/15/21 2325 140/80 97.5 °F (36.4 °C) Oral 69 20 94 %   04/15/21 2105 131/65 98.2 °F (36.8 °C) -- 91 20 94 %   04/15/21 2015 124/64 98.3 °F (36.8 °C) Oral 90 20 93 %   04/15/21 1637 159/73 98.9 °F (37.2 °C) Oral 91 20 97 %   04/15/21 1521 174/85 -- -- -- -- --   04/15/21 1520 -- 98.1 °F (36.7 °C) Oral 71 18 97 %   04/15/21 1215 148/83 -- -- 72 -- --   04/15/21 1132 (!) 182/91 97.9 °F (36.6 °C) Oral 86 18 96 %   04/15/21 0857 (!) 191/85 -- -- 77 -- --       81.6 kg (180 lb)      Intake/Output Summary (Last 24 hours) at 4/16/2021 0746  Last data filed at 4/15/2021 2015  Gross per 24 hour   Intake 1370 ml   Output 200 ml   Net 1170 ml       Review of Systems   Constitutional: Negative for activity change, appetite change and fatigue.   HENT: Positive for hearing loss. Negative for congestion.    Respiratory: Negative for cough, chest tightness, shortness of breath and wheezing.    Cardiovascular: Negative for chest pain.   Gastrointestinal: Negative for abdominal " distention, abdominal pain, constipation, diarrhea, nausea and vomiting.   Endocrine: Negative for polyphagia and polyuria.   Genitourinary: Negative for frequency.   Skin: Negative for rash.   Neurological: Negative for light-headedness.   Hematological: Does not bruise/bleed easily.   Psychiatric/Behavioral: Negative for agitation and behavioral problems.       Physical Exam  Vitals and nursing note reviewed.   Constitutional:       Appearance: She is well-developed.   HENT:      Head: Normocephalic.   Eyes:      Conjunctiva/sclera: Conjunctivae normal.   Neck:      Thyroid: No thyromegaly.      Vascular: No JVD.   Cardiovascular:      Rate and Rhythm: Normal rate and regular rhythm.      Heart sounds: Normal heart sounds. No murmur heard.     Pulmonary:      Effort: Pulmonary effort is normal. No respiratory distress.      Breath sounds: Normal breath sounds. No wheezing or rales.   Abdominal:      General: Bowel sounds are normal. There is no distension.      Palpations: Abdomen is soft.      Tenderness: There is no abdominal tenderness. There is no guarding.   Musculoskeletal:      Cervical back: Normal range of motion.   Skin:     General: Skin is warm and dry.      Findings: No rash.   Neurological:      Mental Status: She is alert.         Medication Review:   I have reviewed the patient's current medication list  Scheduled Meds:allopurinol, 300 mg, Oral, Daily  aspirin, 81 mg, Oral, Daily  bisacodyl, 10 mg, Rectal, Daily  cefTRIAXone, 1 g, Intravenous, Q24H  cholecalciferol, 5,000 Units, Oral, Daily  clopidogrel, 75 mg, Oral, Daily  enoxaparin, 30 mg, Subcutaneous, Q24H  hydrALAZINE, 25 mg, Oral, Q8H  losartan, 100 mg, Oral, Daily  metoprolol tartrate, 50 mg, Oral, BID  pravastatin, 40 mg, Oral, Nightly  sodium chloride, 10 mL, Intravenous, Q12H  venlafaxine XR, 75 mg, Oral, Daily      Continuous Infusions:   PRN Meds:.•  acetaminophen **OR** acetaminophen **OR** acetaminophen  •  hydrALAZINE  •   ondansetron **OR** ondansetron  •  sodium chloride  •  sodium chloride      Labs:  Results from last 7 days   Lab Units 04/14/21 0623 04/13/21 0707 04/12/21 1927   WBC 10*3/mm3 11.59* 10.17 14.19*   HEMOGLOBIN g/dL 12.3 12.4 13.6   HEMATOCRIT % 38.1 39.6 42.2   PLATELETS 10*3/mm3 306 218 331     Results from last 7 days   Lab Units 04/15/21  0415 04/14/21 0623 04/13/21  1908 04/12/21 1927   SODIUM mmol/L 139 141 141 136   POTASSIUM mmol/L 3.7 3.8 3.6 4.2   CHLORIDE mmol/L 103 102 100 96*   CO2 mmol/L 26.8 28.6 21.0* 28.0   BUN mg/dL 21 26* 30* 26*   CREATININE mg/dL 0.94 1.14* 1.51* 1.56*   CALCIUM mg/dL 9.4 9.4 8.9 9.9   BILIRUBIN mg/dL  --   --   --  0.4   ALK PHOS U/L  --   --   --  69   ALT (SGPT) U/L  --   --   --  26   AST (SGOT) U/L  --   --   --  31   GLUCOSE mg/dL 101* 111* 121* 113*           Results from last 7 days   Lab Units 04/14/21 0623 04/13/21 0707 04/12/21 1927   AST (SGOT) U/L  --   --  31   ALT (SGPT) U/L  --   --  26   PLATELETS 10*3/mm3 306 218 331         Lab Results (last 24 hours)     Procedure Component Value Units Date/Time    CBC (No Diff) [[id number]] Collected: 04/16/21 0733    Specimen: Blood Updated: 04/16/21 0744    Comprehensive Metabolic Panel [[id number]] Collected: 04/16/21 0733    Specimen: Blood Updated: 04/16/21 0744        Results from last 7 days   Lab Units 04/13/21 0708 04/12/21 1927   TROPONIN T ng/mL 0.024 0.036*                                 No results found for: POCGLU      Results from last 7 days   Lab Units 04/12/21 1926   URINECX  >100,000 CFU/mL Normal Urogenital Tessa     Results from last 7 days   Lab Units 04/15/21  0626 04/12/21  1926   NITRITE UA  Negative Negative   WBC UA /HPF  --  31-50*   BACTERIA UA /HPF  --  3+*   SQUAM EPITHEL UA /HPF  --  13-20*   URINECX   --  >100,000 CFU/mL Normal Urogenital Tessa             Radiology:  Imaging Results (Last 24 Hours)     ** No results found for the last 24 hours. **          Cardiology:  ECG/EMG  Results (last 24 hours)     Procedure Component Value Units Date/Time    ECG 12 Lead [[id number]] Collected: 04/12/21 2026     Updated: 04/14/21 0743     QT Interval 424 ms     Narrative:      HEART RATE= 77  bpm  RR Interval= 776  ms  WI Interval= 166  ms  P Horizontal Axis= -13  deg  P Front Axis= 60  deg  QRSD Interval= 96  ms  QT Interval= 424  ms  QRS Axis= 35  deg  T Wave Axis= 51  deg  - BORDERLINE ECG -  Sinus rhythm  Probable left atrial enlargement  Borderline T wave abnormalities  NO PRIOR TRACING AVAILABLE FOR COMPARISON  Electronically Signed By: Sonal James ([facility]) 14-Apr-2021 07:41:21  Date and Time of Study: 2021-04-12 20:26:22          I have reviewed recent labs results and consult notes.  Parts of this note may have been copied and pasted but patient was examined and interviewed by me today    Assessment and Plan:    1.  Urinary tract infection all cultures remain negative repeat UA is normal discharged home on p.o. Ceftin    2.  Acute kidney injury  resolved   3.  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 MRI normal  4.  Hypertension improved      Much of this encounter note is an electronic transcription/translation of spoken language to printed text using Dragon Software

## 2021-04-17 ENCOUNTER — READMISSION MANAGEMENT (OUTPATIENT)
Dept: CALL CENTER | Facility: HOSPITAL | Age: 86
End: 2021-04-17

## 2021-04-17 NOTE — OUTREACH NOTE
Prep Survey      Responses   [facility] facility patient discharged from?  [facility]   Is LACE score < 7 ?  Yes   Emergency Room discharge w/ pulse ox?  No   Eligibility  Readm Mgmt   Discharge diagnosis   Urinary tract infection continue with IV Rocephin await cultures   Does the patient have one of the following disease processes/diagnoses(primary or secondary)?  Other   Does the patient have Home health ordered?  Yes   What is the Home health agency?   [facility]     Is there a DME ordered?  No   Prep survey completed?  Yes          Justina Chavez RN

## 2021-04-20 ENCOUNTER — READMISSION MANAGEMENT (OUTPATIENT)
Dept: CALL CENTER | Facility: HOSPITAL | Age: 86
End: 2021-04-20

## 2021-04-21 ENCOUNTER — READMISSION MANAGEMENT (OUTPATIENT)
Dept: CALL CENTER | Facility: HOSPITAL | Age: 86
End: 2021-04-21

## 2021-04-21 NOTE — DISCHARGE SUMMARY
Antonette King  1934  [id number]        Discharge Summary    Date of Admission: 4/12/2021  Date of Discharge: 4/15/2021    Primary Discharge Diagnoses:   Metabolic encephalopathy secondary tract infection  Urinary tract infection  Acute kidney injury resolved  Mild dementia with cognitive dysfunction      Secondary Discharge Diagnoses:     Hypertension  Generalized osteoarthritis    PCP  Patient Care Team:  Gonzalez Dai MD as PCP - General  Gonzalez Dai MD as PCP - Family Medicine    Consults:   Consults     No orders found from 3/14/2021 to 4/13/2021.            History of Present Illness:    87-year-old white female brought by her  to the emergency room because of confusion and hallucinations.  This history is obtained from the ER physician.  Patient developed urinary tract infection and admitted for the evaluation treatment.  This morning patient is awake alert oriented x3 she able to give a history she does not recall that she was having any hallucinations.  She thinks she was a little confused this by her  prior to the hospital.  Denies any fever chills diarrhea nausea vomiting    Hospital Course    Admitted to hospital started on IV antibiotics and IV fluids.  Patient's confusion improved but she was still having some cognitive and difficulties.  Speech evaluation showed moderate cognitive defects and felt that she would benefit from outpatient speech therapy.    Confusion per much resolved and  feels she is back back to baseline.  Urine culture remain negative IV antibiotic discontinued patient was started on p.o. Ceftin finish 7-day course.  Renal function reviewed from her primary care physician's office and patient did have acute kidney injury which resolved with IV fluids her creatinine was back to baseline at time of discharge.    PT OT felt patient was safe to be discharged      Operations and Procedures Performed:       CT Head Without Contrast    Result Date:  4/12/2021  Narrative: CT Head WO HISTORY: Hallucinations for the last 2 days TECHNIQUE: Routine noncontrast head CT. Radiation dose reduction techniques included automated exposure control or exposure modulation based on body size. Radiation audit for CT and nuclear cardiology exams in the last 12 months: 0. COMPARISON: CT head from 11/29/2013 FINDINGS: No acute intracranial hemorrhage, mass lesion, or abnormal extra-axial fluid collection. No midline shift or focal mass effect. Ventricular system is normal in size and configuration. . The gray-white matter differentiation is preserved. There are scattered ill-defined and patchy hypoattenuating foci within the bilateral cerebral and deep white matter which are nonspecific but most commonly associated with chronic microvascular ischemic change. Chronic appearing lacunar infarct near the level the right caudate head. More focal lucency at the level the left precentral gyrus also has a chronic appearance. Findings are new compared to the prior exam from 2013. Visualized paranasal sinuses are clear. Nonspecific right mastoid effusion. No acute osseous abnormality.     Impression: 1.  No definite acute intracranial abnormality by noncontrast CT of the head. 2.  There are are 3.  Worsening chronic microvascular ischemic changes with interval development of a remote lacunar infarct near the level the right caudate head and with also focal area of chronic appearing change in the left precentral gyrus. Signer Name: DAVID PATEL MD  Signed: 4/12/2021 9:25 PM  Workstation Name: [id number]  [facility]    MRI Brain Without Contrast    Result Date: 4/15/2021  Narrative: MRI Brain WO INDICATION: Patient admission from emergency department for hallucinations for several days TECHNIQUE: MRI of the brain without IV contrast. COMPARISON:  CT head 4/12/2021 FINDINGS: There are no areas of restricted diffusion to suggest acute or early subacute vascular  territory infarct. There is a remote lacunar infarct adjacent to the right frontal horn. No evidence of acute intracranial hemorrhage. No mass or mass effect is identified. Mild global volume loss. No hydrocephalus.. Basal cisterns are not effaced. There are fairly extensive T2/FLAIR signal hyperintensities within the bilateral cerebral and deep white matter which are nonspecific but most commonly associated with chronic microvascular ischemic change.  Midline structures are within normal limits. The major intracranial flow voids are maintained. The orbits are grossly within normal limits. Bilateral lens replacement. Patchy fluid in the mastoid air cells bilaterally, more so on the right than the left. Paranasal sinuses are predominantly clear.. Calvarial marrow signal is within normal limits.     Impression: 1.  No acute intracranial abnormality. 2.  Chronic microvascular ischemic changes. 3.  Age-related volume loss. 4.  Nonspecific patchy fluid in the mastoid air cells bilaterally. Signer Name: DAVID PATEL MD  Signed: 4/15/2021 8:49 AM  Workstation Name: [id number]  [facility] Renal Bilateral    Result Date: 4/14/2021  Narrative: US Renal Comp INDICATION: 87-year-old woman with elevated BUN and creatinine. Recent urinary tract infection. COMPARISON: None available. FINDINGS: KIDNEYS:  The right kidney measures 10.6 x 5.4 x 4.5 cm. The right renal cortical echogenicity and cortical thickness is normal. The cortex measures 15 mm. There are 3 cysts within the right kidney. In the upper pole there is a simple cyst measuring 3.8 x 3.0 x 3.9 cm. In the mid kidney there is a 2.5 x 2.5 x 2.3 cm simple cyst. There is an additional medial mid kidney simple cyst measuring 1.9 x 1.8 x 2.1 cm. No solid mass or stone. No obstruction. The left kidney measures 11.8 x 5.6 x 4.4 cm. The left renal cortical echogenicity is normal. The left renal cortical thickness is normal measuring 21 mm. There is a  tiny cyst in the upper pole measuring 9 mm and an additional cyst in the lower pole measuring 1.1 x 1.1 x 1.3 cm. No mass, stone or obstruction URINARY BLADDER:  No bladder wall thickening. No bladder stone     Impression: The kidneys are normal in size with normal renal cortical thickness and echogenicity. There are bilateral simple cysts. These require no additional follow-up. No solid mass, stone or obstruction. Signer Name: Luz Banks MD  Signed: 4/14/2021 2:48 PM  Workstation Name: [id number]  [facility] [place]      Labs:  Results from last 7 days   Lab Units 04/16/21  0733   WBC 10*3/mm3 11.40*   HEMOGLOBIN g/dL 11.9*   HEMATOCRIT % 37.2   PLATELETS 10*3/mm3 309     Results from last 7 days   Lab Units 04/16/21  0733 04/15/21  0415   SODIUM mmol/L 140 139   POTASSIUM mmol/L 3.9 3.7   CHLORIDE mmol/L 103 103   CO2 mmol/L 26.4 26.8   BUN mg/dL 15 21   CREATININE mg/dL 0.85 0.94   CALCIUM mg/dL 9.9 9.4   BILIRUBIN mg/dL 0.3  --    ALK PHOS U/L 62  --    ALT (SGPT) U/L 21  --    AST (SGOT) U/L 20  --    GLUCOSE mg/dL 105* 101*         Labs:         Results from last 7 days   Lab Units 04/14/21  0623 04/13/21  0707 04/12/21  1927   WBC 10*3/mm3 11.59* 10.17 14.19*   HEMOGLOBIN g/dL 12.3 12.4 13.6   HEMATOCRIT % 38.1 39.6 42.2   PLATELETS 10*3/mm3 306 218 331              Results from last 7 days   Lab Units 04/15/21  0415 04/14/21  0623 04/13/21  1908 04/12/21  1927   SODIUM mmol/L 139 141 141 136   POTASSIUM mmol/L 3.7 3.8 3.6 4.2   CHLORIDE mmol/L 103 102 100 96*   CO2 mmol/L 26.8 28.6 21.0* 28.0   BUN mg/dL 21 26* 30* 26*   CREATININE mg/dL 0.94 1.14* 1.51* 1.56*   CALCIUM mg/dL 9.4 9.4 8.9 9.9   BILIRUBIN mg/dL  --   --   --  0.4   ALK PHOS U/L  --   --   --  69   ALT (SGPT) U/L  --   --   --  26   AST (SGOT) U/L  --   --   --  31   GLUCOSE mg/dL 101* 111* 121* 113*                    Results from last 7 days   Lab Units 04/14/21  0623 04/13/21  0707 04/12/21  1927   AST (SGOT) U/L  --    --  31   ALT (SGPT) U/L  --   --  26   PLATELETS 10*3/mm3 306 218 331           Lab Results (last 24 hours)     Procedure Component Value Units Date/Time    Comprehensive Metabolic Panel [[id number]]  (Abnormal) Collected: 04/16/21 0733    Specimen: Blood Updated: 04/16/21 0804     Glucose 105 mg/dL      BUN 15 mg/dL      Creatinine 0.85 mg/dL      Sodium 140 mmol/L      Potassium 3.9 mmol/L      Chloride 103 mmol/L      CO2 26.4 mmol/L      Calcium 9.9 mg/dL      Total Protein 6.8 g/dL      Albumin 3.30 g/dL      ALT (SGPT) 21 U/L      AST (SGOT) 20 U/L      Alkaline Phosphatase 62 U/L      Total Bilirubin 0.3 mg/dL      eGFR Non African Amer 63 mL/min/1.73      Globulin 3.5 gm/dL      A/G Ratio 0.9 g/dL      BUN/Creatinine Ratio 17.6     Anion Gap 10.6 mmol/L     Narrative:      GFR Normal >60  Chronic Kidney Disease <60  Kidney Failure <15      CBC (No Diff) [[id number]]  (Abnormal) Collected: 04/16/21 0733    Specimen: Blood Updated: 04/16/21 0748     WBC 11.40 10*3/mm3      RBC 4.03 10*6/mm3      Hemoglobin 11.9 g/dL      Hematocrit 37.2 %      MCV 92.3 fL      MCH 29.5 pg      MCHC 32.0 g/dL      RDW 13.6 %      RDW-SD 45.7 fl      MPV 10.1 fL      Platelets 309 10*3/mm3                                   Invalid input(s): LDLCALC          No results found for: POCGLU          Results from last 7 days   Lab Units 04/15/21  0626   NITRITE UA  Negative             Radiology:  Imaging Results (Last 24 Hours)     Procedure Component Value Units Date/Time    SCANNED - IMAGING [[id number]] Resulted: 04/12/21     Updated: 04/16/21 1122          PROCEDURES          Allergies:  has No Known Allergies.      Discharge Medications:     Your medication list      START taking these medications      Instructions Last Dose Given Next Dose Due   cefuroxime 250 MG tablet  Commonly known as: CEFTIN      Take 1 tablet by mouth 2 (Two) Times a Day.       hydrALAZINE 25 MG tablet  Commonly known as: APRESOLINE      Take 1 tablet by  mouth Every 8 (Eight) Hours.          CONTINUE taking these medications      Instructions Last Dose Given Next Dose Due   allopurinol 300 MG tablet  Commonly known as: ZYLOPRIM      Take 300 mg by mouth Daily.       ascorbic acid 500 MG tablet  Commonly known as: VITAMIN C      Take 500 mg by mouth Daily.       aspirin 81 MG chewable tablet      Chew 81 mg Daily.       clopidogrel 75 MG tablet  Commonly known as: PLAVIX      Take 75 mg by mouth Daily.       EQL Vitamin D3 125 MCG (5000 UT) capsule capsule  Generic drug: vitamin D3      Take 5,000 Units by mouth Daily.       losartan 100 MG tablet  Commonly known as: COZAAR      Take 100 mg by mouth Daily.       metoprolol tartrate 50 MG tablet  Commonly known as: LOPRESSOR      Take 50 mg by mouth 2 (Two) Times a Day.       multivitamin with minerals tablet tablet      Take 1 tablet by mouth Daily.       pravastatin 40 MG tablet  Commonly known as: PRAVACHOL      Take 40 mg by mouth Every Night.       venlafaxine XR 75 MG 24 hr capsule  Commonly known as: EFFEXOR-XR      Take 75 mg by mouth Daily.             Where to Get Your Medications      These medications were sent to [facility] [address] [address]    Hours: 7:00AM TO 5:00PM MON TO FRI Phone: [phone]   · cefuroxime 250 MG tablet  · hydrALAZINE 25 MG tablet         Home medications and discharge medications reconciled with patient      Condition on Discharge: Stable    Discharge Disposition  Home    Visiting Nurse:    No     Home PT/OT:  Speech therapy    Home Safety Evaluation:  No     DME  None    Discharge Diet:        Activity at Discharge:  Stable      Follow-up Appointments:  Additional Instructions for the Follow-ups that You Need to Schedule     Ambulatory Referral to Home Health   As directed      Face to Face Visit Date: 4/16/2021    Follow-up provider for Plan of Care?: I will be treating the patient on an ongoing basis.  Please send me the Plan of Care for  signature.    Follow-up provider: POLI ALEJANDRO [[id number]]    Reason/Clinical Findings: cognitive deficit    Describe mobility limitations that make leaving home difficult: pt with back pain    Nursing/Therapeutic Services Requested: Speech Therapy    Frequency: 1 Week 1         Call MD for problems / concerns.   As directed      Discharge Follow-up with PCP   As directed       Currently Documented PCP:    Gonzalez Dai MD    PCP Phone Number:    [phone]     Follow Up Details: one week            Contact information for follow-up providers     Gonzalez Dai MD .    Specialty: Family Medicine  Why: one week  Contact information:  [address] [place] [address] [phone]                   Contact information for after-discharge care     Home Medical Care     [facility] .    Service: Home Health Services  Contact information:  [address] [address] [phone]                             Test Results Pending at Discharge       Poli Alejandro MD  04/21/21  07:25 EDT    Much of this encounter note is an electronic transcription/translation of spoken language to printed text using Dragon Software

## 2021-04-21 NOTE — OUTREACH NOTE
LAG < 7 Survey      Responses   [facility] facility patient discharged from?  [place]   Does the patient have one of the following disease processes/diagnoses(primary or secondary)?  Other   BHLAG <7 Attempt successful?  No   Unsuccessful attempts  Attempt 2          Denise Williamson RN

## 2021-04-22 ENCOUNTER — READMISSION MANAGEMENT (OUTPATIENT)
Dept: CALL CENTER | Facility: HOSPITAL | Age: 86
End: 2021-04-22

## 2021-05-20 NOTE — OUTREACH NOTE
LAG < 7 Survey      Responses   [facility] facility patient discharged from?  [place]   Does the patient have one of the following disease processes/diagnoses(primary or secondary)?  Other   BHLAG <7 Attempt successful?  No   Unsuccessful attempts  Attempt 1          Oliva Tom RN   Please follow up with hematology and oncology in 1 week. Contact information and address attached.     Abdominal Pain, Pediatric      Pain in the abdomen (abdominal pain) can be caused by many things. The causes may also change as your child gets older. Often, abdominal pain is not serious, and it gets better without treatment or by being treated at home. However, sometimes abdominal pain is serious.    Your child's health care provider will ask questions about your child's medical history and do a physical exam to try to determine the cause of the abdominal pain.      Follow these instructions at home:     Medicines     •Give over-the-counter and prescription medicines only as told by your child's health care provider.      • Do not give your child a laxative unless told by your child's health care provider.      General instructions     •Watch your child's condition for any changes.      •Have your child drink enough fluid to keep his or her urine pale yellow.      •Keep all follow-up visits as told by your child's health care provider. This is important.        Contact a health care provider if:    •Your child's abdominal pain changes or gets worse.      •Your child is not hungry, or your child loses weight without trying.      •Your child is constipated or has diarrhea for more than 2–3 days.      •Your child has pain when he or she urinates or has a bowel movement.      •Pain wakes your child up at night.      •Your child's pain gets worse with meals, after eating, or with certain foods.      •Your child vomits.      •Your child who is 3 months to 3 years old has a temperature of 102.2°F (39°C) or higher.        Get help right away if:    •Your child's pain does not go away as soon as your child's health care provider told you to expect.      •Your child cannot stop vomiting.      •Your child's pain stays in one area of the abdomen. Pain on the right side could be caused by appendicitis.      •Your child has bloody or black stools, stools that look like tar, or blood in his or her urine.      •Your child who is younger than 3 months has a temperature of 100.4°F (38°C) or higher.      •Your child has severe abdominal pain, cramping, or bloating.    •You notice signs of dehydration in your child who is one year old or younger, such as:  •A sunken soft spot on his or her head.      •No wet diapers in 6 hours.      •Increased fussiness.      •No urine in 8 hours.      •Cracked lips.      •Not making tears while crying.      •Dry mouth.      •Sunken eyes.      •Sleepiness.      •You notice signs of dehydration in your child who is one year old or older, such as:  •No urine in 8–12 hours.      •Cracked lips.      •Not making tears while crying.      •Dry mouth.      •Sunken eyes.      •Sleepiness.      •Weakness.          Summary    •Often, abdominal pain is not serious, and it gets better without treatment or by being treated at home. However, sometimes abdominal pain is serious.      •Watch your child's condition for any changes.      •Give over-the-counter and prescription medicines only as told by your child's health care provider.      •Contact a health care provider if your child's abdominal pain changes or gets worse.      •Get help right away if your child has severe abdominal pain, cramping, or bloating.      This information is not intended to replace advice given to you by your health care provider. Make sure you discuss any questions you have with your health care provider.

## 2021-08-11 ENCOUNTER — TRANSCRIBE ORDERS (OUTPATIENT)
Dept: BONE DENSITY | Facility: HOSPITAL | Age: 86
End: 2021-08-11

## 2021-08-11 DIAGNOSIS — Z78.0 MENOPAUSE: Primary | ICD-10-CM

## 2021-11-04 ENCOUNTER — TRANSCRIBE ORDERS (OUTPATIENT)
Dept: ADMINISTRATIVE | Facility: HOSPITAL | Age: 86
End: 2021-11-04

## 2021-11-04 DIAGNOSIS — Z12.31 SCREENING MAMMOGRAM, ENCOUNTER FOR: Primary | ICD-10-CM

## 2021-11-29 ENCOUNTER — HOSPITAL ENCOUNTER (OUTPATIENT)
Dept: MAMMOGRAPHY | Facility: HOSPITAL | Age: 86
Discharge: HOME | End: 2021-11-29

## 2021-11-29 ENCOUNTER — APPOINTMENT (OUTPATIENT)
Dept: BONE DENSITY | Facility: HOSPITAL | Age: 86
End: 2021-11-29

## 2021-11-29 DIAGNOSIS — Z12.31 SCREENING MAMMOGRAM, ENCOUNTER FOR: ICD-10-CM

## 2021-11-29 DIAGNOSIS — Z78.0 MENOPAUSE: ICD-10-CM

## 2021-11-29 PROCEDURE — 77063 BREAST TOMOSYNTHESIS BI: CPT

## 2021-11-29 PROCEDURE — 77067 SCR MAMMO BI INCL CAD: CPT

## 2021-11-29 PROCEDURE — 77080 DXA BONE DENSITY AXIAL: CPT

## 2021-12-06 ENCOUNTER — TRANSCRIBE ORDERS (OUTPATIENT)
Dept: ADMINISTRATIVE | Facility: HOSPITAL | Age: 86
End: 2021-12-06

## 2021-12-06 DIAGNOSIS — U07.1 CLINICAL DIAGNOSIS OF SEVERE ACUTE RESPIRATORY SYNDROME CORONAVIRUS 2 (SARS-COV-2) DISEASE: Primary | ICD-10-CM

## 2021-12-07 ENCOUNTER — HOSPITAL ENCOUNTER (OUTPATIENT)
Dept: INFUSION THERAPY | Facility: HOSPITAL | Age: 86
Discharge: HOME | End: 2021-12-07
Admitting: FAMILY MEDICINE

## 2021-12-07 VITALS
OXYGEN SATURATION: 94 % | DIASTOLIC BLOOD PRESSURE: 81 MMHG | HEART RATE: 64 BPM | SYSTOLIC BLOOD PRESSURE: 138 MMHG | RESPIRATION RATE: 16 BRPM | TEMPERATURE: 99.6 F

## 2021-12-07 PROCEDURE — 25010000002 INJECTION, CASIRIVIMAB AND IMDEVIMAB, 1200 MG: Performed by: FAMILY MEDICINE

## 2021-12-07 PROCEDURE — M0243 CASIRIVI AND IMDEVI INFUSION: HCPCS | Performed by: FAMILY MEDICINE

## 2021-12-07 RX ORDER — EPINEPHRINE 1 MG/ML
0.3 INJECTION, SOLUTION, CONCENTRATE INTRAVENOUS AS NEEDED
Status: DISCONTINUED | OUTPATIENT
Start: 2021-12-07 | End: 2021-12-09 | Stop reason: HOSPADM

## 2021-12-07 RX ORDER — DIPHENHYDRAMINE HCL 50 MG
50 CAPSULE ORAL ONCE AS NEEDED
Status: DISCONTINUED | OUTPATIENT
Start: 2021-12-07 | End: 2021-12-09 | Stop reason: HOSPADM

## 2021-12-07 RX ORDER — DIPHENHYDRAMINE HYDROCHLORIDE 50 MG/ML
50 INJECTION INTRAMUSCULAR; INTRAVENOUS ONCE AS NEEDED
Status: DISCONTINUED | OUTPATIENT
Start: 2021-12-07 | End: 2021-12-09 | Stop reason: HOSPADM

## 2021-12-07 RX ADMIN — CASIRIVIMAB AND IMDEVIMAB 300 MG: 600; 600 INJECTION, SOLUTION, CONCENTRATE INTRAVENOUS at 14:26

## 2021-12-07 NOTE — NURSING NOTE
Pt arrived to [facility].  EUA given, injection procedure explained, answered all questions.  Injections given, pt monitored for hour; no complications.  AVS given, pt d/c home       [FreeTextEntry1] : Exam unremarkable.\par Fasting bloods and A1c sent along with Covid antibody.\par Medications refilled\par Cialis 20 mg as needed #10 with 5 refills sent.

## 2021-12-15 ENCOUNTER — TRANSCRIBE ORDERS (OUTPATIENT)
Dept: ADMINISTRATIVE | Facility: HOSPITAL | Age: 86
End: 2021-12-15

## 2021-12-15 ENCOUNTER — HOSPITAL ENCOUNTER (OUTPATIENT)
Dept: GENERAL RADIOLOGY | Facility: HOSPITAL | Age: 86
Discharge: HOME | End: 2021-12-15
Admitting: FAMILY MEDICINE

## 2021-12-15 DIAGNOSIS — U07.1 COVID: Primary | ICD-10-CM

## 2021-12-15 DIAGNOSIS — R05.9 COUGH: ICD-10-CM

## 2021-12-15 DIAGNOSIS — U07.1 COVID: ICD-10-CM

## 2021-12-15 PROCEDURE — 71046 X-RAY EXAM CHEST 2 VIEWS: CPT

## 2022-07-07 ENCOUNTER — APPOINTMENT (OUTPATIENT)
Dept: CT IMAGING | Facility: HOSPITAL | Age: 87
End: 2022-07-07

## 2022-07-07 ENCOUNTER — HOSPITAL ENCOUNTER (EMERGENCY)
Facility: HOSPITAL | Age: 87
Discharge: HOME | End: 2022-07-07
Attending: EMERGENCY MEDICINE | Admitting: EMERGENCY MEDICINE

## 2022-07-07 VITALS
TEMPERATURE: 98 F | WEIGHT: 186 LBS | DIASTOLIC BLOOD PRESSURE: 99 MMHG | SYSTOLIC BLOOD PRESSURE: 181 MMHG | OXYGEN SATURATION: 96 % | HEIGHT: 63 IN | RESPIRATION RATE: 16 BRPM | BODY MASS INDEX: 32.96 KG/M2 | HEART RATE: 70 BPM

## 2022-07-07 DIAGNOSIS — I77.810 DILATION OF THORACIC AORTA: Primary | ICD-10-CM

## 2022-07-07 LAB
ALBUMIN SERPL-MCNC: 4 G/DL (ref 3.5–5.2)
ALBUMIN/GLOB SERPL: 1.3 G/DL
ALP SERPL-CCNC: 62 U/L (ref 39–117)
ALT SERPL W P-5'-P-CCNC: 19 U/L (ref 1–33)
ANION GAP SERPL CALCULATED.3IONS-SCNC: 8.9 MMOL/L (ref 5–15)
AST SERPL-CCNC: 30 U/L (ref 1–32)
BACTERIA UR QL AUTO: ABNORMAL /HPF
BASOPHILS # BLD AUTO: 0.06 10*3/MM3 (ref 0–0.2)
BASOPHILS NFR BLD AUTO: 0.6 % (ref 0–1.5)
BILIRUB SERPL-MCNC: 0.4 MG/DL (ref 0–1.2)
BILIRUB UR QL STRIP: NEGATIVE
BUN SERPL-MCNC: 26 MG/DL (ref 8–23)
BUN/CREAT SERPL: 22 (ref 7–25)
CALCIUM SPEC-SCNC: 10.2 MG/DL (ref 8.6–10.5)
CHLORIDE SERPL-SCNC: 99 MMOL/L (ref 98–107)
CLARITY UR: CLEAR
CO2 SERPL-SCNC: 28.1 MMOL/L (ref 22–29)
COLOR UR: YELLOW
CREAT SERPL-MCNC: 1.18 MG/DL (ref 0.57–1)
D DIMER PPP FEU-MCNC: 3.13 MCGFEU/ML (ref 0–0.46)
D-LACTATE SERPL-SCNC: 1.3 MMOL/L (ref 0.5–2)
DEPRECATED RDW RBC AUTO: 48.9 FL (ref 37–54)
EGFRCR SERPLBLD CKD-EPI 2021: 44.5 ML/MIN/1.73
EOSINOPHIL # BLD AUTO: 0.34 10*3/MM3 (ref 0–0.4)
EOSINOPHIL NFR BLD AUTO: 3.3 % (ref 0.3–6.2)
ERYTHROCYTE [DISTWIDTH] IN BLOOD BY AUTOMATED COUNT: 14.2 % (ref 12.3–15.4)
FLUAV RNA RESP QL NAA+PROBE: NOT DETECTED
FLUBV RNA RESP QL NAA+PROBE: NOT DETECTED
GLOBULIN UR ELPH-MCNC: 3.2 GM/DL
GLUCOSE SERPL-MCNC: 106 MG/DL (ref 65–99)
GLUCOSE UR STRIP-MCNC: NEGATIVE MG/DL
HCT VFR BLD AUTO: 37.9 % (ref 34–46.6)
HGB BLD-MCNC: 11.9 G/DL (ref 12–15.9)
HGB UR QL STRIP.AUTO: NEGATIVE
HOLD SPECIMEN: NORMAL
HOLD SPECIMEN: NORMAL
HYALINE CASTS UR QL AUTO: ABNORMAL /LPF
IMM GRANULOCYTES # BLD AUTO: 0.01 10*3/MM3 (ref 0–0.05)
IMM GRANULOCYTES NFR BLD AUTO: 0.1 % (ref 0–0.5)
KETONES UR QL STRIP: NEGATIVE
LEUKOCYTE ESTERASE UR QL STRIP.AUTO: ABNORMAL
LYMPHOCYTES # BLD AUTO: 2.32 10*3/MM3 (ref 0.7–3.1)
LYMPHOCYTES NFR BLD AUTO: 22.8 % (ref 19.6–45.3)
MCH RBC QN AUTO: 29.3 PG (ref 26.6–33)
MCHC RBC AUTO-ENTMCNC: 31.4 G/DL (ref 31.5–35.7)
MCV RBC AUTO: 93.3 FL (ref 79–97)
MONOCYTES # BLD AUTO: 1.03 10*3/MM3 (ref 0.1–0.9)
MONOCYTES NFR BLD AUTO: 10.1 % (ref 5–12)
NEUTROPHILS NFR BLD AUTO: 6.42 10*3/MM3 (ref 1.7–7)
NEUTROPHILS NFR BLD AUTO: 63.1 % (ref 42.7–76)
NITRITE UR QL STRIP: NEGATIVE
NT-PROBNP SERPL-MCNC: 511.9 PG/ML (ref 0–1800)
PH UR STRIP.AUTO: 7 [PH] (ref 4.5–8)
PLATELET # BLD AUTO: 259 10*3/MM3 (ref 140–450)
PMV BLD AUTO: 9.9 FL (ref 6–12)
POTASSIUM SERPL-SCNC: 4.6 MMOL/L (ref 3.5–5.2)
PROCALCITONIN SERPL-MCNC: 0.03 NG/ML (ref 0–0.25)
PROT SERPL-MCNC: 7.2 G/DL (ref 6–8.5)
PROT UR QL STRIP: ABNORMAL
RBC # BLD AUTO: 4.06 10*6/MM3 (ref 3.77–5.28)
RBC # UR STRIP: ABNORMAL /HPF
REF LAB TEST METHOD: ABNORMAL
SARS-COV-2 RNA RESP QL NAA+PROBE: NOT DETECTED
SODIUM SERPL-SCNC: 136 MMOL/L (ref 136–145)
SP GR UR STRIP: 1.02 (ref 1–1.03)
SQUAMOUS #/AREA URNS HPF: ABNORMAL /HPF
TROPONIN T SERPL-MCNC: 0.02 NG/ML (ref 0–0.03)
TROPONIN T SERPL-MCNC: 0.03 NG/ML (ref 0–0.03)
UROBILINOGEN UR QL STRIP: ABNORMAL
WBC # UR STRIP: ABNORMAL /HPF
WBC NRBC COR # BLD: 10.18 10*3/MM3 (ref 3.4–10.8)
WHOLE BLOOD HOLD COAG: NORMAL
WHOLE BLOOD HOLD SPECIMEN: NORMAL

## 2022-07-07 PROCEDURE — 83605 ASSAY OF LACTIC ACID: CPT

## 2022-07-07 PROCEDURE — 36415 COLL VENOUS BLD VENIPUNCTURE: CPT

## 2022-07-07 PROCEDURE — 80053 COMPREHEN METABOLIC PANEL: CPT

## 2022-07-07 PROCEDURE — 71275 CT ANGIOGRAPHY CHEST: CPT

## 2022-07-07 PROCEDURE — 85379 FIBRIN DEGRADATION QUANT: CPT

## 2022-07-07 PROCEDURE — 93005 ELECTROCARDIOGRAM TRACING: CPT | Performed by: EMERGENCY MEDICINE

## 2022-07-07 PROCEDURE — 87636 SARSCOV2 & INF A&B AMP PRB: CPT | Performed by: EMERGENCY MEDICINE

## 2022-07-07 PROCEDURE — 83880 ASSAY OF NATRIURETIC PEPTIDE: CPT

## 2022-07-07 PROCEDURE — 84484 ASSAY OF TROPONIN QUANT: CPT

## 2022-07-07 PROCEDURE — 99284 EMERGENCY DEPT VISIT MOD MDM: CPT

## 2022-07-07 PROCEDURE — 93005 ELECTROCARDIOGRAM TRACING: CPT

## 2022-07-07 PROCEDURE — 0 IOPAMIDOL PER 1 ML: Performed by: EMERGENCY MEDICINE

## 2022-07-07 PROCEDURE — 81001 URINALYSIS AUTO W/SCOPE: CPT

## 2022-07-07 PROCEDURE — 93010 ELECTROCARDIOGRAM REPORT: CPT | Performed by: INTERNAL MEDICINE

## 2022-07-07 PROCEDURE — 85025 COMPLETE CBC W/AUTO DIFF WBC: CPT

## 2022-07-07 PROCEDURE — 84145 PROCALCITONIN (PCT): CPT

## 2022-07-07 RX ORDER — SODIUM CHLORIDE 0.9 % (FLUSH) 0.9 %
10 SYRINGE (ML) INJECTION AS NEEDED
Status: DISCONTINUED | OUTPATIENT
Start: 2022-07-07 | End: 2022-07-07 | Stop reason: HOSPADM

## 2022-07-07 RX ORDER — CLONIDINE HYDROCHLORIDE 0.1 MG/1
0.2 TABLET ORAL ONCE
Status: COMPLETED | OUTPATIENT
Start: 2022-07-07 | End: 2022-07-07

## 2022-07-07 RX ADMIN — CLONIDINE HYDROCHLORIDE 0.2 MG: 0.1 TABLET ORAL at 17:46

## 2022-07-07 RX ADMIN — IOPAMIDOL 81 ML: 755 INJECTION, SOLUTION INTRAVENOUS at 15:27

## 2022-07-07 NOTE — DISCHARGE INSTRUCTIONS
Medication as directed.  Apply heat to affected area.  Gentle stretching.  Follow-up with your PCP or spine specialist as above, sooner if needed.  Return to ED for worsening symptoms, medical emergencies.

## 2022-07-07 NOTE — ED PROVIDER NOTES
EMERGENCY DEPARTMENT ENCOUNTER      Room Number: 01/01    History is provided by the patient, no translation services needed    HPI:    Chief complaint: Shortness of air    Location: Chest    Quality/Severity: Patient advises that she has a sharp pain in her back.  She rates the severity an 8 out of 10.    Timing/Duration: 3 days    Modifying Factors: Lying flat makes the shortness of air worse.    Associated Symptoms: Cough    Narrative: Pt is a 88 y.o. female who presents complaining of shortness of air x3 days.  Patient advises that she has an associated sharp pain in her back that she rates an 8 out of 10.  Patient states that she began having problems on 4 July.  She states that when she is ambulating the shortness of air gets worse.  Patient is also unable to lie flat at night, as the shortness of air worsens.  She also admits to a cough that is occasionally productive of yellow sputum.  Patient denies any nausea, vomiting, diarrhea, abdominal pain, chest pain, headaches.      PMD: Gonzalez Dai MD    REVIEW OF SYSTEMS  Review of Systems   Constitutional: Negative for fever.   HENT: Negative for congestion.    Eyes: Negative for visual disturbance.   Respiratory: Positive for cough (Occasionally productive of yellow sputum) and shortness of breath (X3 days). Negative for chest tightness.    Cardiovascular: Negative for chest pain, palpitations and leg swelling.   Gastrointestinal: Negative for abdominal pain, constipation, diarrhea, nausea and vomiting.   Genitourinary: Negative for difficulty urinating, dysuria and flank pain.   Musculoskeletal: Positive for back pain (Upper right side). Negative for arthralgias and myalgias.   Skin: Negative for color change, pallor, rash and wound.   Neurological: Positive for weakness (With ambulation). Negative for dizziness, syncope and headaches.   Psychiatric/Behavioral: Negative for confusion and suicidal ideas. The patient is not nervous/anxious.          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) 04/16/2021   • Hyperlipidemia 04/16/2021     Resolved Ambulatory Problems     Diagnosis Date Noted   • No Resolved Ambulatory Problems     Past Medical History:   Diagnosis Date   • Breast cancer (HCC) 2001       PAST SURGICAL HISTORY  Past Surgical History:   Procedure Laterality Date   • BREAST BIOPSY     • BREAST LUMPECTOMY Left 2011   • CARDIAC SURGERY     • CARPAL TUNNEL RELEASE     • HYSTERECTOMY     • TONSILLECTOMY         FAMILY HISTORY  Family History   Problem Relation Age of Onset   • Stroke Mother    • Stroke Brother    • Breast cancer Neg Hx        SOCIAL HISTORY  Social History     Socioeconomic History   • Marital status:    Tobacco Use   • Smoking status: Never Smoker   • Smokeless tobacco: Never Used   Vaping Use   • Vaping Use: Never used   Substance and Sexual Activity   • Alcohol use: No   • Drug use: No   • Sexual activity: Defer       ALLERGIES  Patient has no known allergies.      Current Facility-Administered Medications:   •  [COMPLETED] Insert peripheral IV, , , Once **AND** sodium chloride 0.9 % flush 10 mL, 10 mL, Intravenous, PRN, Tara Orozco PA-C    Current Outpatient Medications:   •  allopurinol (ZYLOPRIM) 300 MG tablet, Take 300 mg by mouth Daily., Disp: , Rfl:   •  ascorbic acid (VITAMIN C) 500 MG tablet, Take 500 mg by mouth Daily., Disp: , Rfl:   •  aspirin 81 MG chewable tablet, Chew 81 mg Daily., Disp: , Rfl:   •  clopidogrel (PLAVIX) 75 MG tablet, Take 75 mg by mouth Daily., Disp: , Rfl:   •  hydrALAZINE (APRESOLINE) 25 MG tablet, Take 1 tablet by mouth Every 8 (Eight) Hours., Disp: 90 tablet, Rfl: 0  •  losartan (COZAAR) 100 MG tablet, Take 100 mg by mouth Daily., Disp: , Rfl:   •  metoprolol tartrate (LOPRESSOR) 50 MG tablet, Take 50 mg by mouth 2 (Two)  Times a Day., Disp: , Rfl:   •  pravastatin (PRAVACHOL) 40 MG tablet, Take 40 mg by mouth Every Night., Disp: , Rfl:   •  cefuroxime (CEFTIN) 250 MG tablet, Take 1 tablet by mouth 2 (Two) Times a Day., Disp: 10 tablet, Rfl: 0  •  multivitamin with minerals (MULTIVITAMIN ADULT PO), Take 1 tablet by mouth Daily., Disp: , Rfl:   •  venlafaxine XR (EFFEXOR-XR) 75 MG 24 hr capsule, Take 75 mg by mouth Daily., Disp: , Rfl:   •  vitamin D3 (EQL Vitamin D3) 125 MCG (5000 UT) capsule capsule, Take 5,000 Units by mouth Daily., Disp: , Rfl:     PHYSICAL EXAM  ED Triage Vitals [07/07/22 1316]   Temp Heart Rate Resp BP SpO2   98 °F (36.7 °C) 68 16 130/79 99 %      Temp src Heart Rate Source Patient Position BP Location FiO2 (%)   -- -- -- -- --       Physical Exam  Vitals and nursing note reviewed.   Constitutional:       General: She is not in acute distress.     Appearance: She is well-developed. She is not ill-appearing, toxic-appearing or diaphoretic.      Interventions: She is not intubated.  HENT:      Head: Normocephalic and atraumatic.   Eyes:      Extraocular Movements: Extraocular movements intact.      Conjunctiva/sclera: Conjunctivae normal.      Pupils: Pupils are equal, round, and reactive to light.   Neck:      Vascular: No hepatojugular reflux or JVD.      Trachea: No tracheal deviation.   Cardiovascular:      Rate and Rhythm: Normal rate and regular rhythm.      Heart sounds: Normal heart sounds.   Pulmonary:      Effort: Pulmonary effort is normal. No tachypnea, bradypnea, accessory muscle usage or respiratory distress. She is not intubated.      Breath sounds: Normal breath sounds. No stridor. No decreased breath sounds, wheezing, rhonchi or rales.   Chest:      Chest wall: No mass, deformity, tenderness, crepitus or edema.   Abdominal:      General: Bowel sounds are normal. There is no distension.      Palpations: Abdomen is soft.      Tenderness: There is no abdominal tenderness. There is no guarding or  rebound.   Musculoskeletal:         General: Normal range of motion.      Cervical back: Normal range of motion and neck supple.      Right lower leg: No tenderness. No edema.      Left lower leg: No tenderness. No edema.   Skin:     General: Skin is warm and dry.      Coloration: Skin is not cyanotic or pale.      Findings: No ecchymosis, erythema or rash.      Nails: There is no clubbing.   Neurological:      Mental Status: She is alert and oriented to person, place, and time.   Psychiatric:         Mood and Affect: Mood and affect normal.           LAB RESULTS  Lab Results (last 24 hours)     Procedure Component Value Units Date/Time    CBC & Differential [[id number]]  (Abnormal) Collected: 07/07/22 1348    Specimen: Blood Updated: 07/07/22 1404    Narrative:      The following orders were created for panel order CBC & Differential.  Procedure                               Abnormality         Status                     ---------                               -----------         ------                     CBC Auto Differential[[id number]]        Abnormal            Final result                 Please view results for these tests on the individual orders.    Comprehensive Metabolic Panel [[id number]]  (Abnormal) Collected: 07/07/22 1348    Specimen: Blood Updated: 07/07/22 1439     Glucose 106 mg/dL      BUN 26 mg/dL      Creatinine 1.18 mg/dL      Sodium 136 mmol/L      Potassium 4.6 mmol/L      Comment: Slight hemolysis detected by analyzer. Results may be affected.        Chloride 99 mmol/L      CO2 28.1 mmol/L      Calcium 10.2 mg/dL      Total Protein 7.2 g/dL      Albumin 4.00 g/dL      ALT (SGPT) 19 U/L      AST (SGOT) 30 U/L      Alkaline Phosphatase 62 U/L      Total Bilirubin 0.4 mg/dL      Globulin 3.2 gm/dL      A/G Ratio 1.3 g/dL      BUN/Creatinine Ratio 22.0     Anion Gap 8.9 mmol/L      eGFR 44.5 mL/min/1.73      Comment: National Kidney Foundation and American Society of Nephrology (ASN) Task Force  recommended calculation based on the Chronic Kidney Disease Epidemiology Collaboration (CKD-EPI) equation refit without adjustment for race.       Narrative:      GFR Normal >60  Chronic Kidney Disease <60  Kidney Failure <15      BNP [[id number]]  (Normal) Collected: 07/07/22 1348    Specimen: Blood Updated: 07/07/22 1429     proBNP 511.9 pg/mL     Narrative:      Among patients with dyspnea, NT-proBNP is highly sensitive for the detection of acute congestive heart failure. In addition NT-proBNP of <300 pg/ml effectively rules out acute congestive heart failure with 99% negative predictive value.    Results may be falsely decreased if patient taking Biotin.      D-dimer, Quantitative [[id number]]  (Abnormal) Collected: 07/07/22 1348    Specimen: Blood Updated: 07/07/22 1415     D-Dimer, Quantitative 3.13 MCGFEU/mL     Narrative:      Can be elevated in, but is not diagnostic for deep vein thrombosis (DVT) or pulmonary embolis (PE).  It is also elevated in other medical conditions.  Clinical correlation is required.  The negative cut-off value for the D-Dimer is 0.50 mcg FEU/mL for DVT and PE.      Troponin [[id number]]  (Normal) Collected: 07/07/22 1348    Specimen: Blood Updated: 07/07/22 1427     Troponin T 0.025 ng/mL     Narrative:      Troponin T Reference Range:  <= 0.03 ng/mL-   Negative for AMI  >0.03 ng/mL-     Abnormal for myocardial necrosis.  Clinicians would have to utilize clinical acumen, EKG, Troponin and serial changes to determine if it is an Acute Myocardial Infarction or myocardial injury due to an underlying chronic condition.       Results may be falsely decreased if patient taking Biotin.      Procalcitonin [[id number]]  (Normal) Collected: 07/07/22 1348    Specimen: Blood Updated: 07/07/22 1429     Procalcitonin 0.03 ng/mL     Narrative:      As a Marker for Sepsis (Non-Neonates):    1. <0.5 ng/mL represents a low risk of severe sepsis and/or septic shock.  2. >2 ng/mL represents a high risk of  "severe sepsis and/or septic shock.    As a Marker for Lower Respiratory Tract Infections that require antibiotic therapy:    PCT on Admission    Antibiotic Therapy       6-12 Hrs later    >0.5                Strongly Recommended  >0.25 - <0.5        Recommended   0.1 - 0.25          Discouraged              Remeasure/reassess PCT  <0.1                Strongly Discouraged     Remeasure/reassess PCT    As 28 day mortality risk marker: \"Change in Procalcitonin Result\" (>80% or <=80%) if Day 0 (or Day 1) and Day 4 values are available. Refer to http://www.[facility]-pct-calculator.com    Change in PCT <=80%  A decrease of PCT levels below or equal to 80% defines a positive change in PCT test result representing a higher risk for 28-day all-cause mortality of patients diagnosed with severe sepsis for septic shock.    Change in PCT >80%  A decrease of PCT levels of more than 80% defines a negative change in PCT result representing a lower risk for 28-day all-cause mortality of patients diagnosed with severe sepsis or septic shock.       CBC Auto Differential [[id number]]  (Abnormal) Collected: 07/07/22 1348    Specimen: Blood Updated: 07/07/22 1404     WBC 10.18 10*3/mm3      RBC 4.06 10*6/mm3      Hemoglobin 11.9 g/dL      Hematocrit 37.9 %      MCV 93.3 fL      MCH 29.3 pg      MCHC 31.4 g/dL      RDW 14.2 %      RDW-SD 48.9 fl      MPV 9.9 fL      Platelets 259 10*3/mm3      Neutrophil % 63.1 %      Lymphocyte % 22.8 %      Monocyte % 10.1 %      Eosinophil % 3.3 %      Basophil % 0.6 %      Immature Grans % 0.1 %      Neutrophils, Absolute 6.42 10*3/mm3      Lymphocytes, Absolute 2.32 10*3/mm3      Monocytes, Absolute 1.03 10*3/mm3      Eosinophils, Absolute 0.34 10*3/mm3      Basophils, Absolute 0.06 10*3/mm3      Immature Grans, Absolute 0.01 10*3/mm3     Urinalysis With Microscopic If Indicated (No Culture) - Urine, Clean Catch [[id number]]  (Abnormal) Collected: 07/07/22 1411    Specimen: Urine, Clean Catch Updated: " 07/07/22 1427     Color, UA Yellow     Appearance, UA Clear     pH, UA 7.0     Specific Gravity, UA 1.020     Glucose, UA Negative     Ketones, UA Negative     Bilirubin, UA Negative     Blood, UA Negative     Protein, UA Trace     Leuk Esterase, UA Small (1+)     Nitrite, UA Negative     Urobilinogen, UA 0.2 E.U./dL    Lactic Acid, Plasma [[id number]]  (Normal) Collected: 07/07/22 1411    Specimen: Blood Updated: 07/07/22 1435     Lactate 1.3 mmol/L     Urinalysis, Microscopic Only - Urine, Clean Catch [[id number]]  (Abnormal) Collected: 07/07/22 1411    Specimen: Urine, Clean Catch Updated: 07/07/22 1432     RBC, UA 0-2 /HPF      WBC, UA 3-5 /HPF      Bacteria, UA None Seen /HPF      Squamous Epithelial Cells, UA 0-2 /HPF      Hyaline Casts, UA 0-2 /LPF      Methodology Manual Light Microscopy    COVID-19 and FLU A/B PCR - Swab, Nasopharynx [[id number]]  (Normal) Collected: 07/07/22 1425    Specimen: Swab from Nasopharynx Updated: 07/07/22 1455     COVID19 Not Detected     Influenza A PCR Not Detected     Influenza B PCR Not Detected    Narrative:      Fact sheet for providers: https://www.fda.gov/media/374671/download    Fact sheet for patients: https://www.fda.gov/media/871802/download    Test performed by PCR.    Troponin [[id number]]  (Normal) Collected: 07/07/22 1611    Specimen: Blood Updated: 07/07/22 1638     Troponin T 0.020 ng/mL     Narrative:      Troponin T Reference Range:  <= 0.03 ng/mL-   Negative for AMI  >0.03 ng/mL-     Abnormal for myocardial necrosis.  Clinicians would have to utilize clinical acumen, EKG, Troponin and serial changes to determine if it is an Acute Myocardial Infarction or myocardial injury due to an underlying chronic condition.       Results may be falsely decreased if patient taking Biotin.              I ordered the above labs and reviewed the results    RADIOLOGY  CT Angiogram Chest    Result Date: 7/7/2022  CTA Chest INDICATION: Shortness of air, elevated d-dimer and right  back pain TECHNIQUE: CT angiogram of the chest with 100 mL of Isovue-370 IV contrast. 3-D reconstructions were obtained and reviewed.   Radiation dose reduction techniques included automated exposure control or exposure modulation based on body size. Count of known CT and cardiac nuc med studies performed in previous 12 months: 0. COMPARISON: None available. FINDINGS: Adequate opacification of the pulmonary arteries with no filling defects. Pulmonary artery is normal in caliber. No evidence of right heart strain. Heart size is normal. No pericardial effusion. Coronary artery calcifications are noted. Aneurysmal dilatation of the ascending thoracic aorta measuring up to 4.4 cm. Nonspecific small focus of exophytic nodularity along the posterior aspect of the descending thoracic aorta (4-80) No pleural effusion. No pneumothorax. No focal pulmonary consolidation. Mosaic attenuation of the lungs which may be due to air trapping. Visualized upper abdomen is unremarkable. No acute osseous abnormality. Median sternotomy wires in place.     1. Negative for pulmonary embolus. 2. Extensive aortic atherosclerotic disease with aneurysmal dilatation of the lower descending thoracic aorta measuring up to 4.4 cm. Small focus of nodularity along the posterior aspect of the descending thoracic aorta which is nonspecific, however small area of ulcerated plaque cannot be fully excluded and is incompletely evaluated due to contrast bolus timing. Signer Name: Hansel Novoa  Signed: 7/7/2022 4:25 PM  Workstation Name: [id number]  [facility]      I ordered the above radiologic testing and reviewed the results    PROCEDURES  Procedures      PROGRESS AND CONSULTS  ED Course as of 07/07/22 1804   Thu Jul 07, 2022   1422 EKG         EKG time / Interpretation time: 1329 / 1333  Rhythm/Rate: Sinus, 72   LA: 175  QRS, axis: 33  QTc 451  ST and T waves: No acute ST segment changes or T wave abnormalities.  EKG Tracing  Interpreted Contemporaneously by me, independently viewed by me and MD.  Unchanged compared to prior 04/12/2021.  [AH]   1701 EKG         EKG time / Interpretation time: 1620 / 1622  Rhythm/Rate: Sinus, 73   OR: 170  QRS, axis: 15  QTc 462  ST and T waves: No acute ST segment changes or T wave abnormalities.  EKG Tracing Interpreted Contemporaneously by me, independently viewed by me and MD.  Unchanged compared to prior from today.   [AH]   1704 Dr. Alvaro Rodrigues advised to have the pt follow up with him out patient in his office in approximately one month regarding the thoracic aortic dilation. [AH]   1715 Patient reports that she is no longer on Ceftin.  Lung sounds are clear and equal bilaterally.  O2 saturations within normal limits on room air.  No obvious distress.  Normal work of breathing.  Will discharge with follow-up instructions.  Return to the ED instructions given. [AH]   1804 RN advised that the BP has improved. [AH]      ED Course User Index  [AH] Tara Orozco PA-C           MEDICAL DECISION MAKING    MDM     My differential diagnosis for dyspnea includes but is not limited to:  Asthma, COPD, pneumonia, pulmonary embolus, acute respiratory distress syndrome, pneumothorax, pleural effusion, pulmonary fibrosis, congestive heart failure, myocardial infarction, DKA, uremia, acidosis, sepsis, anemia, drug related, hyperventilation, CNS disease  DIAGNOSIS  Final diagnoses:   Dilation of thoracic aorta (HCC)       Latest Documented Vital Signs:  As of 18:04 EDT  BP- (!) 178/107 HR- 70 Temp- 98 °F (36.7 °C) O2 sat- 96%    DISPOSITION  Pt discharged    Discussed pertinent findings with the patient/family.  Patient/Family voiced understanding of need to follow-up for recheck and further testing as needed.  Return to the Emergency Department warnings were given.         Medication List      No changes were made to your prescriptions during this visit.              Follow-up Information     Gonzalez Dai,  MD. Call today.    Specialty: Family Medicine  Why: to schedule follow up  Contact information:  [address] [facility] [address]  [phone]             Alvaro Rodrigues MD. Call today.    Specialty: Cardiothoracic Surgery  Why: to schedule follow up  Contact information:  [address]  [address] [address]  [phone]             Go to  [facility] Emergency Department.    Specialty: Emergency Medicine  Why: If symptoms worsen  Contact information:  [address] [facility] [address] [phone]                         Dictated utilizing Mishaon dictation     Tara Orozco PA-C  07/07/22 7316       Tara Orozco PA-C  07/07/22 8435

## 2022-07-07 NOTE — ED NOTES
Call placed to [facility] cardiac surgery. Per Lisa, on call answering service,  will be paged.

## 2022-07-08 LAB
QT INTERVAL: 411 MS
QT INTERVAL: 417 MS

## 2022-09-02 ENCOUNTER — OFFICE VISIT (OUTPATIENT)
Dept: CARDIAC SURGERY | Facility: CLINIC | Age: 87
End: 2022-09-02

## 2022-09-02 VITALS
RESPIRATION RATE: 20 BRPM | DIASTOLIC BLOOD PRESSURE: 91 MMHG | HEART RATE: 67 BPM | BODY MASS INDEX: 32.96 KG/M2 | HEIGHT: 63 IN | OXYGEN SATURATION: 96 % | WEIGHT: 186 LBS | SYSTOLIC BLOOD PRESSURE: 178 MMHG | TEMPERATURE: 96.9 F

## 2022-09-02 DIAGNOSIS — I77.810 THORACIC AORTIC ECTASIA: ICD-10-CM

## 2022-09-02 DIAGNOSIS — R01.1 MURMUR, CARDIAC: Primary | ICD-10-CM

## 2022-09-02 PROCEDURE — 99213 OFFICE O/P EST LOW 20 MIN: CPT | Performed by: NURSE PRACTITIONER

## 2022-09-02 RX ORDER — CELECOXIB 200 MG/1
200 CAPSULE ORAL DAILY
COMMUNITY
End: 2022-10-12 | Stop reason: ALTCHOICE

## 2022-09-02 RX ORDER — BUSPIRONE HYDROCHLORIDE 10 MG/1
10 TABLET ORAL 3 TIMES DAILY
COMMUNITY

## 2022-09-02 NOTE — PROGRESS NOTES
9/2/2022      Subjective:      Gonzalez Dai MD    Chief Complaint: Ascending aortic ectasia    History of Present Illness:       Dear Gonzalez Zapata MD and Colleagues,    It was nice to see Antonette King in consultation at your request, I am performing her initial consultation at the request of Dr. Rodrigues. She is a 88 y.o. female with a history of CAD status post CABG in 2000 at [facility], hypertension, hyperlipidemia, early dementia who has a history of CT of the chest due to shortness of breath and elevated D-dimer and an incidental finding of ascending aortic dilatation was discovered.  The CTA of chest on 7/7/2022 was reviewed and the ascending aorta measures maximally at 4.4 cm, DTA 3.1 cm, and aortic root 3.7 cm.  She states that she does have shortness of breath with exertion, but no recent syncopal events or near syncope.  She denies chest/back pain or numbness/tingling/pain of extremities.  No vascular deficiencies or hyperextensible or hypermobile joints are noted on exam.  The patient's family history is negative for aneurysms or dissections, negative for connective tissue disease, and negative for coronary disease in first degree family members.      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 (HCC) 4/16/2021   • Hyperlipidemia    • Hyperlipidemia 4/16/2021   • Hypertension    • Hypertension 4/16/2021       Past Surgical History:   Procedure Laterality Date   • BREAST BIOPSY     •  BREAST LUMPECTOMY Left 2011   • CARDIAC SURGERY     • CARPAL TUNNEL RELEASE     • HYSTERECTOMY     • TONSILLECTOMY         No Known Allergies      Current Outpatient Medications:   •  allopurinol (ZYLOPRIM) 300 MG tablet, Take 300 mg by mouth Daily., Disp: , Rfl:   •  ascorbic acid (VITAMIN C) 500 MG tablet, Take 500 mg by mouth Daily., Disp: , Rfl:   •  aspirin 81 MG chewable tablet, Chew 81 mg Daily., Disp: , Rfl:   •  busPIRone (BUSPAR) 10 MG tablet, Take 10 mg by mouth 3 (Three) Times a Day., Disp: , Rfl:   •  celecoxib (CeleBREX) 200 MG capsule, Take 200 mg by mouth Daily., Disp: , Rfl:   •  clopidogrel (PLAVIX) 75 MG tablet, Take 75 mg by mouth Daily., Disp: , Rfl:   •  hydrALAZINE (APRESOLINE) 25 MG tablet, Take 1 tablet by mouth Every 8 (Eight) Hours., Disp: 90 tablet, Rfl: 0  •  losartan (COZAAR) 100 MG tablet, Take 100 mg by mouth Daily., Disp: , Rfl:   •  metoprolol tartrate (LOPRESSOR) 50 MG tablet, Take 50 mg by mouth 2 (Two) Times a Day., Disp: , Rfl:   •  multivitamin with minerals tablet tablet, Take 1 tablet by mouth Daily., Disp: , Rfl:   •  pravastatin (PRAVACHOL) 40 MG tablet, Take 40 mg by mouth Every Night., Disp: , Rfl:   •  venlafaxine XR (EFFEXOR-XR) 75 MG 24 hr capsule, Take 75 mg by mouth Daily., Disp: , Rfl:   •  vitamin D3 125 MCG (5000 UT) capsule capsule, Take 5,000 Units by mouth Daily., Disp: , Rfl:     Social History     Socioeconomic History   • Marital status:    Tobacco Use   • Smoking status: Never Smoker   • Smokeless tobacco: Never Used   Vaping Use   • Vaping Use: Never used   Substance and Sexual Activity   • Alcohol use: No   • Drug use: No   • Sexual activity: Defer       Family History   Problem Relation Age of Onset   • Stroke Mother    • Stroke Brother    • Breast cancer Neg Hx        The following portions of the patient's history were reviewed and updated as appropriate: allergies, current medications, past family history, past medical history, past social  history, past surgical history and problem list.    Review of Systems:  Review of Systems   Constitutional: Negative for activity change and fatigue.   Respiratory: Positive for shortness of breath. Negative for apnea and chest tightness.    Cardiovascular: Negative for chest pain and palpitations.   Gastrointestinal: Negative for nausea and vomiting.   Skin: Negative for color change and pallor.   Neurological: Negative for dizziness, syncope, weakness, light-headedness and numbness.   All other systems reviewed and are negative.      Physical Exam:    Vital Signs:  Weight: 84.4 kg (186 lb)   Body mass index is 32.95 kg/m².  Temp: 96.9 °F (36.1 °C)   Heart Rate: 67   BP: 178/91     Constitutional:       Appearance: Healthy appearance. Well-developed and well-groomed.   Neck:      Vascular: Normal carotid pulses. No carotid bruit or JVD.   Pulmonary:      Effort: Pulmonary effort is normal.      Breath sounds: Normal breath sounds. No decreased breath sounds. No wheezing. No rhonchi. No rales.   Cardiovascular:      Normal rate. Regular rhythm.      Murmurs: There is a grade 3/6 systolic murmur.      No gallop. No rub.   Pulses:     Carotid: 2+ bilaterally.     Radial: 2+ bilaterally.     Dorsalis pedis: 2+ bilaterally.     Posterior tibial: 2+ bilaterally.  Abdominal:      General: Bowel sounds are normal. There is no distension.      Palpations: Abdomen is soft. There is no abdominal mass.      Tenderness: There is no abdominal tenderness. There is no guarding.   Skin:     General: Skin is warm and dry.      Coloration: Skin is not pale.      Findings: No erythema or rash.   Neurological:      Mental Status: Alert, oriented to person, place, and time and oriented to person, place and time.   Psychiatric:         Attention and Perception: Attention normal.         Mood and Affect: Mood normal.         Speech: Speech normal.         Behavior: Behavior normal. Behavior is cooperative.         Thought Content: Thought  content normal.         Judgment: Judgment normal.          Assessment:     1.  Ascending aortic ectasia--- 4.4 cm  2.  Murmur----obtain echo, referral to cardiologist  3.  CAD status post CABG 2000  4.  Hypertension----not optimally controlled, follow-up with primary care and cardiology    Recommendation/Plan:     Repeat CT chest without contrast in 1 year    We discussed the importance of avoidance of over the counter decongestants and stimulants, specifically pseudoephedrine, for hypertension and aneurysm management    Risk factor modification of hypertension with a goal blood pressure less than 130/80, hyperlipidemia optimization, and continued avoidance of tobacco/nicotine products.    Initiation of low aerobic exercise is indicated to help reduce body habitus, assist with blood pressure and cholesterol control.       Although risk of rupture is low, emergency department presentation is warranted for acute chest, back, or abdominal pain, syncope, or stroke like symptoms    Thank you for allowing us to participate in her care.    Regards,    ESAU Gates    ** all problems new to this practitioner, extensive review of records prior and during patient interview, >35 minutes in face to face conversation regarding treatment plan, education, and answering any questions the patient may have had

## 2022-09-06 PROBLEM — I77.810 THORACIC AORTIC ECTASIA (HCC): Status: ACTIVE | Noted: 2022-09-06

## 2022-09-23 ENCOUNTER — HOSPITAL ENCOUNTER (OUTPATIENT)
Dept: CARDIOLOGY | Facility: HOSPITAL | Age: 87
Discharge: HOME | End: 2022-09-23
Admitting: NURSE PRACTITIONER

## 2022-09-23 VITALS
DIASTOLIC BLOOD PRESSURE: 91 MMHG | WEIGHT: 186 LBS | SYSTOLIC BLOOD PRESSURE: 178 MMHG | BODY MASS INDEX: 32.96 KG/M2 | HEIGHT: 63 IN

## 2022-09-23 DIAGNOSIS — R01.1 MURMUR, CARDIAC: ICD-10-CM

## 2022-09-23 LAB
AORTIC DIMENSIONLESS INDEX: 0.8 (DI)
BH CV ECHO MEAS - ACS: 1.5 CM
BH CV ECHO MEAS - AO MAX PG: 10.9 MMHG
BH CV ECHO MEAS - AO MEAN PG: 6 MMHG
BH CV ECHO MEAS - AO ROOT DIAM: 3 CM
BH CV ECHO MEAS - AO V2 MAX: 165 CM/SEC
BH CV ECHO MEAS - AO V2 VTI: 31.7 CM
BH CV ECHO MEAS - AVA(I,D): 2.24 CM2
BH CV ECHO MEAS - EDV(CUBED): 64 ML
BH CV ECHO MEAS - EDV(MOD-SP2): 87 ML
BH CV ECHO MEAS - EDV(MOD-SP4): 83 ML
BH CV ECHO MEAS - EF(MOD-BP): 75 %
BH CV ECHO MEAS - EF(MOD-SP2): 73.6 %
BH CV ECHO MEAS - EF(MOD-SP4): 74.7 %
BH CV ECHO MEAS - ESV(CUBED): 25.2 ML
BH CV ECHO MEAS - ESV(MOD-SP2): 23 ML
BH CV ECHO MEAS - ESV(MOD-SP4): 21 ML
BH CV ECHO MEAS - FS: 26.7 %
BH CV ECHO MEAS - IVS/LVPW: 1.1 CM
BH CV ECHO MEAS - IVSD: 1.1 CM
BH CV ECHO MEAS - LAT PEAK E' VEL: 9.1 CM/SEC
BH CV ECHO MEAS - LV DIASTOLIC VOL/BSA (35-75): 44.3 CM2
BH CV ECHO MEAS - LV MASS(C)D: 136.2 GRAMS
BH CV ECHO MEAS - LV MAX PG: 6.6 MMHG
BH CV ECHO MEAS - LV MEAN PG: 3 MMHG
BH CV ECHO MEAS - LV SYSTOLIC VOL/BSA (12-30): 11.2 CM2
BH CV ECHO MEAS - LV V1 MAX: 128 CM/SEC
BH CV ECHO MEAS - LV V1 VTI: 26.5 CM
BH CV ECHO MEAS - LVIDD: 4 CM
BH CV ECHO MEAS - LVIDS: 2.9 CM
BH CV ECHO MEAS - LVOT AREA: 2.7 CM2
BH CV ECHO MEAS - LVOT DIAM: 1.85 CM
BH CV ECHO MEAS - LVPWD: 1 CM
BH CV ECHO MEAS - MED PEAK E' VEL: 7.3 CM/SEC
BH CV ECHO MEAS - MR MAX PG: 26 MMHG
BH CV ECHO MEAS - MR MAX VEL: 255.1 CM/SEC
BH CV ECHO MEAS - MV A MAX VEL: 107.4 CM/SEC
BH CV ECHO MEAS - MV DEC SLOPE: 399.4 CM/SEC2
BH CV ECHO MEAS - MV DEC TIME: 0.3 MSEC
BH CV ECHO MEAS - MV E MAX VEL: 75.5 CM/SEC
BH CV ECHO MEAS - MV E/A: 0.7
BH CV ECHO MEAS - MV MAX PG: 4.6 MMHG
BH CV ECHO MEAS - MV MEAN PG: 2.36 MMHG
BH CV ECHO MEAS - MV P1/2T: 78.8 MSEC
BH CV ECHO MEAS - MV V2 VTI: 28.3 CM
BH CV ECHO MEAS - MVA(P1/2T): 2.8 CM2
BH CV ECHO MEAS - MVA(VTI): 2.5 CM2
BH CV ECHO MEAS - PA V2 MAX: 118.7 CM/SEC
BH CV ECHO MEAS - PI END-D VEL: 139.6 CM/SEC
BH CV ECHO MEAS - QP/QS: 0.99
BH CV ECHO MEAS - RAP SYSTOLE: 3 MMHG
BH CV ECHO MEAS - RV MAX PG: 2.47 MMHG
BH CV ECHO MEAS - RV V1 MAX: 78.6 CM/SEC
BH CV ECHO MEAS - RV V1 VTI: 18 CM
BH CV ECHO MEAS - RVOT DIAM: 2.23 CM
BH CV ECHO MEAS - SI(MOD-SP2): 34.1 ML/M2
BH CV ECHO MEAS - SI(MOD-SP4): 33.1 ML/M2
BH CV ECHO MEAS - SV(LVOT): 70.9 ML
BH CV ECHO MEAS - SV(MOD-SP2): 64 ML
BH CV ECHO MEAS - SV(MOD-SP4): 62 ML
BH CV ECHO MEAS - SV(RVOT): 70 ML
BH CV ECHO MEASUREMENTS AVERAGE E/E' RATIO: 9.21
BH CV XLRA - TDI S': 15.6 CM/SEC
LEFT ATRIUM VOLUME INDEX: 27.2 ML/M2
MAXIMAL PREDICTED HEART RATE: 132 BPM
SINUS: 3.1 CM
STJ: 2.42 CM
STRESS TARGET HR: 112 BPM

## 2022-09-23 PROCEDURE — 93306 TTE W/DOPPLER COMPLETE: CPT | Performed by: INTERNAL MEDICINE

## 2022-09-23 PROCEDURE — 93306 TTE W/DOPPLER COMPLETE: CPT

## 2022-09-23 PROCEDURE — 25010000002 PERFLUTREN (DEFINITY) 8.476 MG IN SODIUM CHLORIDE (PF) 0.9 % 10 ML INJECTION: Performed by: FAMILY MEDICINE

## 2022-09-23 RX ADMIN — SODIUM CHLORIDE 2 ML: 9 INJECTION INTRAMUSCULAR; INTRAVENOUS; SUBCUTANEOUS at 15:34

## 2022-10-12 ENCOUNTER — OFFICE VISIT (OUTPATIENT)
Dept: CARDIOLOGY | Facility: CLINIC | Age: 87
End: 2022-10-12

## 2022-10-12 VITALS
BODY MASS INDEX: 30.99 KG/M2 | SYSTOLIC BLOOD PRESSURE: 148 MMHG | HEIGHT: 65 IN | DIASTOLIC BLOOD PRESSURE: 92 MMHG | HEART RATE: 71 BPM | WEIGHT: 186 LBS | OXYGEN SATURATION: 99 %

## 2022-10-12 DIAGNOSIS — E78.2 MIXED HYPERLIPIDEMIA: ICD-10-CM

## 2022-10-12 DIAGNOSIS — R06.02 SOB (SHORTNESS OF BREATH): ICD-10-CM

## 2022-10-12 DIAGNOSIS — R07.89 OTHER CHEST PAIN: ICD-10-CM

## 2022-10-12 DIAGNOSIS — I77.810 THORACIC AORTIC ECTASIA: ICD-10-CM

## 2022-10-12 DIAGNOSIS — I25.10 CAD, MULTIPLE VESSEL: ICD-10-CM

## 2022-10-12 DIAGNOSIS — Z95.1 S/P CABG (CORONARY ARTERY BYPASS GRAFT): Chronic | ICD-10-CM

## 2022-10-12 DIAGNOSIS — I10 PRIMARY HYPERTENSION: Primary | ICD-10-CM

## 2022-10-12 PROCEDURE — 99204 OFFICE O/P NEW MOD 45 MIN: CPT | Performed by: INTERNAL MEDICINE

## 2022-10-12 RX ORDER — LOSARTAN POTASSIUM AND HYDROCHLOROTHIAZIDE 12.5; 1 MG/1; MG/1
1 TABLET ORAL DAILY
COMMUNITY
Start: 2022-09-08

## 2022-10-12 NOTE — PROGRESS NOTES
"    Subjective:     Encounter Date:10/12/22      Patient ID: Antonette King is a 88 y.o. female.    Chief Complaint:  History of Present Illness    Dear Dulce,    I had the pleasure of seeing this patient in the office today for initial evaluation and consultation.  I appreciate that you sent her in to see us.  They come in today to be seen for evaluation of her recently checked her heart murmur.    Note she was just in to see you because of a CTA of the chest obtained July 7, 2022 showed an ascending aorta that measured 4.4 cm in maximal transverse diameter.    She was noted to have a heart murmur and so you obtain an echocardiogram reviewed and detailed below.  This showed aortic valve calcification but no stenosis.    Additionally, patient has a history of CAD, status post CABG in 2000 and [facility].  I apparently saw her at the time but there are no records available and I do not know the details of her bypass.  She does not recall who did the surgery.  She has not been followed by cardiology since.    She does have pretty significant dyspnea on exertion.  This in part was why she had the CT scan.  She also has frequent back pain.  That bothers her when she is doing things.  If she ambulates she will get some back discomfort and get short of breath, she will sit down and those sensations will improve.  No radiation of the back discomfort.  No associated nausea, vomiting, or diaphoresis.    The following portions of the patient's history were reviewed and updated as appropriate: allergies, current medications, past family history, past medical history, past social history, past surgical history and problem list.    Procedures       Objective:     Vitals:    10/12/22 0918   BP: 148/92   BP Location: Left arm   Patient Position: Sitting   Cuff Size: Adult   Pulse: 71   SpO2: 99%   Weight: 84.4 kg (186 lb)   Height: 163.8 cm (64.5\")     Body mass index is 31.43 kg/m².      Vitals reviewed.   Constitutional:  "      General: Not in acute distress.     Appearance: Well-developed. Not diaphoretic.   Eyes:      General:         Right eye: No discharge.         Left eye: No discharge.      Conjunctiva/sclera: Conjunctivae normal.      Pupils: Pupils are equal, round, and reactive to light.   HENT:      Head: Normocephalic and atraumatic.      Nose: Nose normal.   Neck:      Thyroid: No thyromegaly.      Trachea: No tracheal deviation.      Lymphadenopathy: No cervical adenopathy.   Pulmonary:      Effort: Pulmonary effort is normal. No respiratory distress.      Breath sounds: Normal breath sounds. No stridor.   Chest:      Chest wall: Not tender to palpatation.   Cardiovascular:      Normal rate. Regular rhythm.      Murmurs: There is a grade 1/6 harsh midsystolic murmur at the URSB, radiating to the neck.      . No S3 gallop. No click. No rub.   Pulses:     Intact distal pulses.   Edema:     Peripheral edema absent.   Abdominal:      General: Bowel sounds are normal. There is no distension.      Palpations: Abdomen is soft. There is no abdominal mass.      Tenderness: There is no abdominal tenderness. There is no guarding or rebound.   Musculoskeletal: Normal range of motion.         General: No tenderness or deformity.      Cervical back: Normal range of motion and neck supple. Skin:     General: Skin is warm and dry.      Findings: No erythema or rash.   Neurological:      Mental Status: Alert and oriented to person, place, and time.      Deep Tendon Reflexes: Reflexes are normal and symmetric.   Psychiatric:         Thought Content: Thought content normal.         Data and records reviewed:     Lab Results   Component Value Date    GLUCOSE 106 (H) 07/07/2022    BUN 26 (H) 07/07/2022    CREATININE 1.18 (H) 07/07/2022    EGFRIFNONA 63 04/16/2021    BCR 22.0 07/07/2022    K 4.6 07/07/2022    CO2 28.1 07/07/2022    CALCIUM 10.2 07/07/2022    ALBUMIN 4.00 07/07/2022    AST 30 07/07/2022    ALT 19 07/07/2022     No results  found for: CHOL  No results found for: TRIG  No results found for: HDL  No results found for: LDL  No results found for: VLDL  No results found for: LDLHDL  CBC    CBC 7/7/22   WBC 10.18   RBC 4.06   Hemoglobin 11.9 (A)   Hematocrit 37.9   MCV 93.3   MCH 29.3   MCHC 31.4 (A)   RDW 14.2   Platelets 259   (A) Abnormal value            No radiology results for the last 90 days.  Results for orders placed during the hospital encounter of 09/23/22    Adult Transthoracic Echo Complete w/ Color, Spectral and Contrast if necessary per protocol    Interpretation Summary  · Left ventricular wall thickness is consistent with mild concentric hypertrophy.  · Estimated left ventricular EF was in agreement with the calculated left ventricular EF. Left ventricular ejection fraction appears to be greater than 70%.  · There is calcification of the aortic valve.  · Left ventricular diastolic function is consistent with (grade I) impaired relaxation.          Assessment:          Diagnosis Plan   1. Primary hypertension  Stress Test With Myocardial Perfusion One Day      2. Mixed hyperlipidemia  Stress Test With Myocardial Perfusion One Day      3. Other chest pain  Stress Test With Myocardial Perfusion One Day      4. SOB (shortness of breath)  Stress Test With Myocardial Perfusion One Day      5. S/P CABG (coronary artery bypass graft)        6. CAD, multiple vessel        7. Thoracic aortic ectasia (HCC)               Plan:       1.  Murmur- echocardiogram demonstrates aortic calcification but no stenosis, we will continue to follow  2.  Coronary artery disease- status post CABG 2000, no details of the surgery.  Now having exertional dyspnea and exertional back discomfort concerning for angina pectoris, unable to ambulate on treadmill, we will arrange for a pharmacologic nuclear perfusion scan  3.  Hypertension, remains on combination metoprolol and losartan along with hydralazine  4.  Mixed hyperlipidemia on pravastatin, AST, ALT  reviewed  Thank you very much for allowing us to participate in the care of this pleasant patient.  Please don't hesitate to call if I can be of assistance in any way.  5.  Patient is on dual antiplatelet therapy with aspirin and clopidogrel, has been on this for years and states that Dr. Dai prescribed it for a long time ago.  All his records I have are 2016 and she was on it at that time, not sure of the indication, we will call Dr. Dai's office and get records.    Current Outpatient Medications:   •  allopurinol (ZYLOPRIM) 300 MG tablet, Take 300 mg by mouth Daily., Disp: , Rfl:   •  ascorbic acid (VITAMIN C) 500 MG tablet, Take 500 mg by mouth Daily., Disp: , Rfl:   •  aspirin 81 MG chewable tablet, Chew 81 mg Daily., Disp: , Rfl:   •  busPIRone (BUSPAR) 10 MG tablet, Take 10 mg by mouth 3 (Three) Times a Day., Disp: , Rfl:   •  clopidogrel (PLAVIX) 75 MG tablet, Take 75 mg by mouth Daily., Disp: , Rfl:   •  hydrALAZINE (APRESOLINE) 25 MG tablet, Take 1 tablet by mouth Every 8 (Eight) Hours., Disp: 90 tablet, Rfl: 0  •  losartan-hydrochlorothiazide (HYZAAR) 100-12.5 MG per tablet, Take 1 tablet by mouth Daily., Disp: , Rfl:   •  metoprolol tartrate (LOPRESSOR) 50 MG tablet, Take 50 mg by mouth 2 (Two) Times a Day., Disp: , Rfl:   •  multivitamin with minerals tablet tablet, Take 1 tablet by mouth Daily., Disp: , Rfl:   •  pravastatin (PRAVACHOL) 40 MG tablet, Take 40 mg by mouth Every Night., Disp: , Rfl:   •  venlafaxine XR (EFFEXOR-XR) 75 MG 24 hr capsule, Take 75 mg by mouth Daily., Disp: , Rfl:   •  vitamin D3 125 MCG (5000 UT) capsule capsule, Take 5,000 Units by mouth Daily., Disp: , Rfl:          No follow-ups on file.

## 2022-10-19 ENCOUNTER — HOSPITAL ENCOUNTER (OUTPATIENT)
Dept: NUCLEAR MEDICINE | Facility: HOSPITAL | Age: 87
Discharge: HOME | End: 2022-10-19

## 2022-10-19 ENCOUNTER — HOSPITAL ENCOUNTER (OUTPATIENT)
Dept: CARDIOLOGY | Facility: HOSPITAL | Age: 87
Discharge: HOME | End: 2022-10-19

## 2022-10-19 DIAGNOSIS — R06.02 SOB (SHORTNESS OF BREATH): ICD-10-CM

## 2022-10-19 DIAGNOSIS — R07.89 OTHER CHEST PAIN: ICD-10-CM

## 2022-10-19 DIAGNOSIS — I10 PRIMARY HYPERTENSION: ICD-10-CM

## 2022-10-19 DIAGNOSIS — E78.2 MIXED HYPERLIPIDEMIA: ICD-10-CM

## 2022-10-19 LAB
BH CV REST NUCLEAR ISOTOPE DOSE: 11.1 MCI
BH CV STRESS BP STAGE 1: NORMAL
BH CV STRESS COMMENTS STAGE 1: NORMAL
BH CV STRESS DOSE REGADENOSON STAGE 1: 0.4
BH CV STRESS DURATION MIN STAGE 1: 0
BH CV STRESS DURATION SEC STAGE 1: 30
BH CV STRESS HR STAGE 1: 100
BH CV STRESS NUCLEAR ISOTOPE DOSE: 35.5 MCI
BH CV STRESS O2 STAGE 1: 99
BH CV STRESS PROTOCOL 1: NORMAL
BH CV STRESS RECOVERY BP: NORMAL MMHG
BH CV STRESS RECOVERY HR: 106 BPM
BH CV STRESS RECOVERY O2: 97 %
BH CV STRESS STAGE 1: 1
LV EF NUC BP: 72 %
MAXIMAL PREDICTED HEART RATE: 132 BPM
PERCENT MAX PREDICTED HR: 84.09 %
STRESS BASELINE BP: NORMAL MMHG
STRESS BASELINE HR: 95 BPM
STRESS O2 SAT REST: 96 %
STRESS PERCENT HR: 99 %
STRESS POST ESTIMATED WORKLOAD: 1 METS
STRESS POST EXERCISE DUR SEC: 30 SEC
STRESS POST O2 SAT PEAK: 99 %
STRESS POST PEAK BP: NORMAL MMHG
STRESS POST PEAK HR: 111 BPM
STRESS TARGET HR: 112 BPM

## 2022-10-19 PROCEDURE — 93017 CV STRESS TEST TRACING ONLY: CPT

## 2022-10-19 PROCEDURE — 0 TECHNETIUM SESTAMIBI: Performed by: INTERNAL MEDICINE

## 2022-10-19 PROCEDURE — 93016 CV STRESS TEST SUPVJ ONLY: CPT | Performed by: INTERNAL MEDICINE

## 2022-10-19 PROCEDURE — A9500 TC99M SESTAMIBI: HCPCS | Performed by: INTERNAL MEDICINE

## 2022-10-19 PROCEDURE — 78452 HT MUSCLE IMAGE SPECT MULT: CPT

## 2022-10-19 PROCEDURE — 93018 CV STRESS TEST I&R ONLY: CPT | Performed by: INTERNAL MEDICINE

## 2022-10-19 PROCEDURE — 25010000002 REGADENOSON 0.4 MG/5ML SOLUTION: Performed by: INTERNAL MEDICINE

## 2022-10-19 PROCEDURE — 78452 HT MUSCLE IMAGE SPECT MULT: CPT | Performed by: INTERNAL MEDICINE

## 2022-10-19 RX ADMIN — TECHNETIUM TC 99M SESTAMIBI 1 DOSE: 1 INJECTION INTRAVENOUS at 09:31

## 2022-10-19 RX ADMIN — REGADENOSON 0.4 MG: 0.08 INJECTION, SOLUTION INTRAVENOUS at 09:31

## 2022-10-19 RX ADMIN — TECHNETIUM TC 99M SESTAMIBI 1 DOSE: 1 INJECTION INTRAVENOUS at 08:06

## 2022-10-26 ENCOUNTER — TRANSCRIBE ORDERS (OUTPATIENT)
Dept: ADMINISTRATIVE | Facility: HOSPITAL | Age: 87
End: 2022-10-26

## 2022-10-26 DIAGNOSIS — Z12.31 VISIT FOR SCREENING MAMMOGRAM: Primary | ICD-10-CM

## 2022-10-28 ENCOUNTER — OFFICE VISIT (OUTPATIENT)
Dept: CARDIOLOGY | Facility: CLINIC | Age: 87
End: 2022-10-28

## 2022-10-28 ENCOUNTER — TELEPHONE (OUTPATIENT)
Dept: CARDIOLOGY | Facility: CLINIC | Age: 87
End: 2022-10-28

## 2022-10-28 DIAGNOSIS — I20.9 ANGINA PECTORIS: ICD-10-CM

## 2022-10-28 DIAGNOSIS — Z95.1 S/P CABG (CORONARY ARTERY BYPASS GRAFT): Primary | Chronic | ICD-10-CM

## 2022-10-28 DIAGNOSIS — I77.810 THORACIC AORTIC ECTASIA: ICD-10-CM

## 2022-10-28 DIAGNOSIS — I10 PRIMARY HYPERTENSION: ICD-10-CM

## 2022-10-28 DIAGNOSIS — I25.10 CAD, MULTIPLE VESSEL: ICD-10-CM

## 2022-10-28 DIAGNOSIS — E78.2 MIXED HYPERLIPIDEMIA: ICD-10-CM

## 2022-10-28 PROCEDURE — 99442 PR PHYS/QHP TELEPHONE EVALUATION 11-20 MIN: CPT | Performed by: INTERNAL MEDICINE

## 2022-10-28 NOTE — TELEPHONE ENCOUNTER
----- Message from Juan Jose Gonzalez III, MD sent at 10/26/2022  5:03 PM EDT -----  Singh Amin I have had difficulty reaching this patient, can we set her up a telehealth visit for me to go over the results on her study, this week if possible, if not any afternoon early next week would work as well  ----- Message -----  From: Ian Bolden MD  Sent: 10/19/2022  12:25 PM EDT  To: Juan Jose Gonzalez III, MD

## 2022-10-28 NOTE — PROGRESS NOTES
Subjective:     Encounter Date:10/28/22      Patient ID: Antonette King is a 88 y.o. female.    Chief Complaint:  History of Present Illness    Dear Dulce,    I had the pleasure of having a visit with this patient this afternoon.  We set it up as a telehealth visit-I try to reach her initially by fine but had not been successful.    This patient has consented to a telehealth visit via audio. The visit was scheduled as a audio visit to comply with patient safety concerns in accordance with CDC recommendations.  All vitals recorded within this visit are reported by the patient.  I spent  16 minutes in total including but not limited to the 12 minutes spent in direct conversation with this patient.     I saw her recently because she was having pretty significant dyspnea on exertion.  She had evaluation with CT scan that was unremarkable.  She is also having frequent pain in her back.  She notices more when she was doing things.  She has noted that if she ambulated she got some back discomfort and got short of breath, she would sit down and this would go away.  Currently we set her up for stress evaluation, outlined below.  She also had an echocardiogram.  The echo did not show anything significant, the stress test showed inferolateral ischemia with preserved function.    Patient has a history of CAD, status post CABG in 2000 and [facility].  I apparently saw her at the time but there are no records available and I do not know the details of her bypass.  She does not recall who did the surgery.  She has not been followed by cardiology since.        The following portions of the patient's history were reviewed and updated as appropriate: allergies, current medications, past family history, past medical history, past social history, past surgical history and problem list.    Procedures       Objective:     There were no vitals filed for this visit.  There is no height or weight on file to calculate BMI.      Alert and oriented x3, thought processes normal, judgment normal, speaking in full sentences with no wheezing and no respiratory distress. Hearing is appropriate without difficulty.  Data and records reviewed:     Lab Results   Component Value Date    GLUCOSE 106 (H) 07/07/2022    BUN 26 (H) 07/07/2022    CREATININE 1.18 (H) 07/07/2022    EGFRIFNONA 63 04/16/2021    BCR 22.0 07/07/2022    K 4.6 07/07/2022    CO2 28.1 07/07/2022    CALCIUM 10.2 07/07/2022    ALBUMIN 4.00 07/07/2022    AST 30 07/07/2022    ALT 19 07/07/2022     No results found for: CHOL  No results found for: TRIG  No results found for: HDL  No results found for: LDL  No results found for: VLDL  No results found for: LDLHDL  CBC    CBC 7/7/22   WBC 10.18   RBC 4.06   Hemoglobin 11.9 (A)   Hematocrit 37.9   MCV 93.3   MCH 29.3   MCHC 31.4 (A)   RDW 14.2   Platelets 259   (A) Abnormal value            No radiology results for the last 90 days.  Results for orders placed during the hospital encounter of 09/23/22    Adult Transthoracic Echo Complete w/ Color, Spectral and Contrast if necessary per protocol    Interpretation Summary  · Left ventricular wall thickness is consistent with mild concentric hypertrophy.  · Estimated left ventricular EF was in agreement with the calculated left ventricular EF. Left ventricular ejection fraction appears to be greater than 70%.  · There is calcification of the aortic valve.  · Left ventricular diastolic function is consistent with (grade I) impaired relaxation.    Stress test October 2022  Interpretation Summary       •  Myocardial perfusion imaging indicates a medium-sized, moderately severe area of ischemia located in the inferior wall and lateral wall.  •  Left ventricular ejection fraction is hyperdynamic (Calculated EF > 70%). .           Assessment:          Diagnosis Plan   1. S/P CABG (coronary artery bypass graft)        2. CAD, multiple vessel        3. Angina pectoris (HCC)        4. Thoracic aortic  ectasia (HCC)        5. Mixed hyperlipidemia        6. Primary hypertension               Plan:       1.  Abnormal stress test, inferolateral ischemia, discussed cardiac catheterization versus augmentation of medical therapy, in part given her age she would prefer to try medicine first, we will add isosorbide mononitrate 30 mg once daily to her regimen, I have asked him to call me back in 1-2 weeks  2.  Coronary artery disease- status post CABG 2000, no details of the surgery.  Has remained on dual antiplatelet therapy ever since her surgery, given the results of the stress test I am not can to change that at this point  3.  Hypertension, remains on combination metoprolol and losartan along with hydralazine  4.  Mixed hyperlipidemia on pravastatin, AST, ALT reviewed    Thank you very much for allowing us to participate in the care of this pleasant patient.  Please don't hesitate to call if I can be of assistance in any way.        Current Outpatient Medications:   •  allopurinol (ZYLOPRIM) 300 MG tablet, Take 300 mg by mouth Daily., Disp: , Rfl:   •  ascorbic acid (VITAMIN C) 500 MG tablet, Take 500 mg by mouth Daily., Disp: , Rfl:   •  aspirin 81 MG chewable tablet, Chew 81 mg Daily., Disp: , Rfl:   •  busPIRone (BUSPAR) 10 MG tablet, Take 10 mg by mouth 3 (Three) Times a Day., Disp: , Rfl:   •  clopidogrel (PLAVIX) 75 MG tablet, Take 75 mg by mouth Daily., Disp: , Rfl:   •  hydrALAZINE (APRESOLINE) 25 MG tablet, Take 1 tablet by mouth Every 8 (Eight) Hours., Disp: 90 tablet, Rfl: 0  •  losartan-hydrochlorothiazide (HYZAAR) 100-12.5 MG per tablet, Take 1 tablet by mouth Daily., Disp: , Rfl:   •  metoprolol tartrate (LOPRESSOR) 50 MG tablet, Take 50 mg by mouth 2 (Two) Times a Day., Disp: , Rfl:   •  multivitamin with minerals tablet tablet, Take 1 tablet by mouth Daily., Disp: , Rfl:   •  pravastatin (PRAVACHOL) 40 MG tablet, Take 40 mg by mouth Every Night., Disp: , Rfl:   •  venlafaxine XR (EFFEXOR-XR) 75 MG 24  hr capsule, Take 75 mg by mouth Daily., Disp: , Rfl:   •  vitamin D3 125 MCG (5000 UT) capsule capsule, Take 5,000 Units by mouth Daily., Disp: , Rfl:          No follow-ups on file.

## 2022-10-31 RX ORDER — ISOSORBIDE MONONITRATE 30 MG/1
30 TABLET, EXTENDED RELEASE ORAL DAILY
Qty: 90 TABLET | Refills: 1 | Status: SHIPPED | OUTPATIENT
Start: 2022-10-31

## 2022-10-31 RX ORDER — ISOSORBIDE MONONITRATE 30 MG/1
30 TABLET, EXTENDED RELEASE ORAL DAILY
COMMUNITY
End: 2022-10-31 | Stop reason: SDUPTHER

## 2022-11-16 ENCOUNTER — TRANSCRIBE ORDERS (OUTPATIENT)
Dept: ADMINISTRATIVE | Facility: HOSPITAL | Age: 87
End: 2022-11-16

## 2022-11-16 DIAGNOSIS — Z78.0 MENOPAUSE: Primary | ICD-10-CM

## 2022-11-21 ENCOUNTER — TELEPHONE (OUTPATIENT)
Dept: CARDIOLOGY | Facility: CLINIC | Age: 87
End: 2022-11-21

## 2022-12-01 ENCOUNTER — HOSPITAL ENCOUNTER (OUTPATIENT)
Dept: MAMMOGRAPHY | Facility: HOSPITAL | Age: 87
Discharge: HOME | End: 2022-12-01
Admitting: FAMILY MEDICINE

## 2022-12-01 DIAGNOSIS — Z12.31 VISIT FOR SCREENING MAMMOGRAM: ICD-10-CM

## 2022-12-01 PROCEDURE — 77067 SCR MAMMO BI INCL CAD: CPT

## 2022-12-01 PROCEDURE — 77063 BREAST TOMOSYNTHESIS BI: CPT

## 2023-04-21 RX ORDER — ISOSORBIDE MONONITRATE 30 MG/1
TABLET, EXTENDED RELEASE ORAL
Qty: 90 TABLET | Refills: 1 | Status: SHIPPED | OUTPATIENT
Start: 2023-04-21

## 2023-07-24 DIAGNOSIS — I77.810 THORACIC AORTIC ECTASIA: Primary | ICD-10-CM

## 2023-07-31 ENCOUNTER — HOSPITAL ENCOUNTER (OUTPATIENT)
Facility: HOSPITAL | Age: 88
LOS: 2 days | Discharge: HOME | End: 2023-08-02
Attending: EMERGENCY MEDICINE | Admitting: HOSPITALIST
Payer: MEDICARE

## 2023-07-31 ENCOUNTER — APPOINTMENT (OUTPATIENT)
Dept: GENERAL RADIOLOGY | Facility: HOSPITAL | Age: 88
End: 2023-07-31
Payer: MEDICARE

## 2023-07-31 ENCOUNTER — APPOINTMENT (OUTPATIENT)
Dept: NUCLEAR MEDICINE | Facility: HOSPITAL | Age: 88
End: 2023-07-31
Payer: MEDICARE

## 2023-07-31 DIAGNOSIS — R01.1 HEART MURMUR, SYSTOLIC: ICD-10-CM

## 2023-07-31 DIAGNOSIS — I10 ACCELERATED HYPERTENSION: ICD-10-CM

## 2023-07-31 DIAGNOSIS — R07.9 CHEST PAIN, UNSPECIFIED TYPE: Primary | ICD-10-CM

## 2023-07-31 DIAGNOSIS — R79.89 ELEVATED D-DIMER: ICD-10-CM

## 2023-07-31 DIAGNOSIS — R06.02 SHORTNESS OF BREATH: ICD-10-CM

## 2023-07-31 DIAGNOSIS — N17.9 ACUTE KIDNEY INJURY: ICD-10-CM

## 2023-07-31 LAB
ALBUMIN SERPL-MCNC: 4.1 G/DL (ref 3.5–5.2)
ALBUMIN/GLOB SERPL: 1.4 G/DL
ALP SERPL-CCNC: 75 U/L (ref 39–117)
ALT SERPL W P-5'-P-CCNC: 42 U/L (ref 1–33)
ANION GAP SERPL CALCULATED.3IONS-SCNC: 11.2 MMOL/L (ref 5–15)
AST SERPL-CCNC: 30 U/L (ref 1–32)
BACTERIA UR QL AUTO: ABNORMAL /HPF
BASOPHILS # BLD AUTO: 0.09 10*3/MM3 (ref 0–0.2)
BASOPHILS NFR BLD AUTO: 0.5 % (ref 0–1.5)
BILIRUB SERPL-MCNC: 0.6 MG/DL (ref 0–1.2)
BILIRUB UR QL STRIP: NEGATIVE
BUN SERPL-MCNC: 46 MG/DL (ref 8–23)
BUN/CREAT SERPL: 27.1 (ref 7–25)
CALCIUM SPEC-SCNC: 9.8 MG/DL (ref 8.6–10.5)
CHLORIDE SERPL-SCNC: 99 MMOL/L (ref 98–107)
CHOLEST SERPL-MCNC: 221 MG/DL (ref 0–200)
CLARITY UR: ABNORMAL
CO2 SERPL-SCNC: 25.8 MMOL/L (ref 22–29)
COLOR UR: YELLOW
CREAT SERPL-MCNC: 1.7 MG/DL (ref 0.57–1)
D DIMER PPP FEU-MCNC: 6.09 MCGFEU/ML (ref 0–0.89)
DEPRECATED RDW RBC AUTO: 51.4 FL (ref 37–54)
EGFRCR SERPLBLD CKD-EPI 2021: 28.5 ML/MIN/1.73
EOSINOPHIL # BLD AUTO: 0.29 10*3/MM3 (ref 0–0.4)
EOSINOPHIL NFR BLD AUTO: 1.6 % (ref 0.3–6.2)
ERYTHROCYTE [DISTWIDTH] IN BLOOD BY AUTOMATED COUNT: 14.7 % (ref 12.3–15.4)
FLUAV RNA RESP QL NAA+PROBE: NOT DETECTED
FLUBV RNA RESP QL NAA+PROBE: NOT DETECTED
GEN 5 2HR TROPONIN T REFLEX: 39 NG/L
GLOBULIN UR ELPH-MCNC: 3 GM/DL
GLUCOSE SERPL-MCNC: 107 MG/DL (ref 65–99)
GLUCOSE UR STRIP-MCNC: NEGATIVE MG/DL
HCT VFR BLD AUTO: 40.1 % (ref 34–46.6)
HDLC SERPL-MCNC: 77 MG/DL (ref 40–60)
HGB BLD-MCNC: 12.3 G/DL (ref 12–15.9)
HGB UR QL STRIP.AUTO: NEGATIVE
HYALINE CASTS UR QL AUTO: ABNORMAL /LPF
IMM GRANULOCYTES # BLD AUTO: 0.12 10*3/MM3 (ref 0–0.05)
IMM GRANULOCYTES NFR BLD AUTO: 0.7 % (ref 0–0.5)
KETONES UR QL STRIP: NEGATIVE
LDLC SERPL CALC-MCNC: 124 MG/DL (ref 0–100)
LDLC/HDLC SERPL: 1.57 {RATIO}
LEUKOCYTE ESTERASE UR QL STRIP.AUTO: ABNORMAL
LYMPHOCYTES # BLD AUTO: 1.91 10*3/MM3 (ref 0.7–3.1)
LYMPHOCYTES NFR BLD AUTO: 10.5 % (ref 19.6–45.3)
MCH RBC QN AUTO: 29.4 PG (ref 26.6–33)
MCHC RBC AUTO-ENTMCNC: 30.7 G/DL (ref 31.5–35.7)
MCV RBC AUTO: 95.7 FL (ref 79–97)
MONOCYTES # BLD AUTO: 1.75 10*3/MM3 (ref 0.1–0.9)
MONOCYTES NFR BLD AUTO: 9.6 % (ref 5–12)
NEUTROPHILS NFR BLD AUTO: 14.02 10*3/MM3 (ref 1.7–7)
NEUTROPHILS NFR BLD AUTO: 77.1 % (ref 42.7–76)
NITRITE UR QL STRIP: NEGATIVE
NT-PROBNP SERPL-MCNC: 5601 PG/ML (ref 0–1800)
PH UR STRIP.AUTO: 7 [PH] (ref 4.5–8)
PLATELET # BLD AUTO: 287 10*3/MM3 (ref 140–450)
PMV BLD AUTO: 10.1 FL (ref 6–12)
POTASSIUM SERPL-SCNC: 4.3 MMOL/L (ref 3.5–5.2)
PROCALCITONIN SERPL-MCNC: 0.07 NG/ML (ref 0–0.25)
PROT SERPL-MCNC: 7.1 G/DL (ref 6–8.5)
PROT UR QL STRIP: ABNORMAL
RBC # BLD AUTO: 4.19 10*6/MM3 (ref 3.77–5.28)
RBC # UR STRIP: ABNORMAL /HPF
RBC MORPH BLD: NORMAL
REF LAB TEST METHOD: ABNORMAL
SARS-COV-2 RNA RESP QL NAA+PROBE: NOT DETECTED
SMALL PLATELETS BLD QL SMEAR: ADEQUATE
SODIUM SERPL-SCNC: 136 MMOL/L (ref 136–145)
SP GR UR STRIP: 1.02 (ref 1–1.03)
SQUAMOUS #/AREA URNS HPF: ABNORMAL /HPF
TRIGL SERPL-MCNC: 115 MG/DL (ref 0–150)
TROPONIN T DELTA: -2 NG/L
TROPONIN T SERPL HS-MCNC: 41 NG/L
TSH SERPL DL<=0.05 MIU/L-ACNC: 3.06 UIU/ML (ref 0.27–4.2)
UROBILINOGEN UR QL STRIP: ABNORMAL
VLDLC SERPL-MCNC: 20 MG/DL (ref 5–40)
WBC # UR STRIP: ABNORMAL /HPF
WBC MORPH BLD: NORMAL
WBC NRBC COR # BLD: 18.18 10*3/MM3 (ref 3.4–10.8)

## 2023-07-31 PROCEDURE — 85379 FIBRIN DEGRADATION QUANT: CPT | Performed by: EMERGENCY MEDICINE

## 2023-07-31 PROCEDURE — 80061 LIPID PANEL: CPT | Performed by: HOSPITALIST

## 2023-07-31 PROCEDURE — 81001 URINALYSIS AUTO W/SCOPE: CPT | Performed by: EMERGENCY MEDICINE

## 2023-07-31 PROCEDURE — 93005 ELECTROCARDIOGRAM TRACING: CPT | Performed by: EMERGENCY MEDICINE

## 2023-07-31 PROCEDURE — 84443 ASSAY THYROID STIM HORMONE: CPT | Performed by: HOSPITALIST

## 2023-07-31 PROCEDURE — 96372 THER/PROPH/DIAG INJ SC/IM: CPT

## 2023-07-31 PROCEDURE — A9540 TC99M MAA: HCPCS | Performed by: EMERGENCY MEDICINE

## 2023-07-31 PROCEDURE — 99284 EMERGENCY DEPT VISIT MOD MDM: CPT

## 2023-07-31 PROCEDURE — 83880 ASSAY OF NATRIURETIC PEPTIDE: CPT | Performed by: EMERGENCY MEDICINE

## 2023-07-31 PROCEDURE — A9567 TECHNETIUM TC-99M AEROSOL: HCPCS | Performed by: EMERGENCY MEDICINE

## 2023-07-31 PROCEDURE — 85007 BL SMEAR W/DIFF WBC COUNT: CPT | Performed by: EMERGENCY MEDICINE

## 2023-07-31 PROCEDURE — 96375 TX/PRO/DX INJ NEW DRUG ADDON: CPT

## 2023-07-31 PROCEDURE — 85025 COMPLETE CBC W/AUTO DIFF WBC: CPT | Performed by: EMERGENCY MEDICINE

## 2023-07-31 PROCEDURE — 71045 X-RAY EXAM CHEST 1 VIEW: CPT

## 2023-07-31 PROCEDURE — 0 TECHNETIUM TC 99M PENTETATE INHALER: Performed by: EMERGENCY MEDICINE

## 2023-07-31 PROCEDURE — 87636 SARSCOV2 & INF A&B AMP PRB: CPT | Performed by: EMERGENCY MEDICINE

## 2023-07-31 PROCEDURE — 96361 HYDRATE IV INFUSION ADD-ON: CPT

## 2023-07-31 PROCEDURE — 84484 ASSAY OF TROPONIN QUANT: CPT | Performed by: EMERGENCY MEDICINE

## 2023-07-31 PROCEDURE — 93010 ELECTROCARDIOGRAM REPORT: CPT | Performed by: INTERNAL MEDICINE

## 2023-07-31 PROCEDURE — 36415 COLL VENOUS BLD VENIPUNCTURE: CPT

## 2023-07-31 PROCEDURE — 94761 N-INVAS EAR/PLS OXIMETRY MLT: CPT

## 2023-07-31 PROCEDURE — 96365 THER/PROPH/DIAG IV INF INIT: CPT

## 2023-07-31 PROCEDURE — 96376 TX/PRO/DX INJ SAME DRUG ADON: CPT

## 2023-07-31 PROCEDURE — 84145 PROCALCITONIN (PCT): CPT | Performed by: HOSPITALIST

## 2023-07-31 PROCEDURE — 25010000002 HYDRALAZINE PER 20 MG: Performed by: EMERGENCY MEDICINE

## 2023-07-31 PROCEDURE — 80053 COMPREHEN METABOLIC PANEL: CPT | Performed by: EMERGENCY MEDICINE

## 2023-07-31 PROCEDURE — 78582 LUNG VENTILAT&PERFUS IMAGING: CPT

## 2023-07-31 PROCEDURE — 25010000002 ENOXAPARIN PER 10 MG: Performed by: HOSPITALIST

## 2023-07-31 PROCEDURE — 96366 THER/PROPH/DIAG IV INF ADDON: CPT

## 2023-07-31 PROCEDURE — 0 TECHNETIUM ALBUMIN AGGREGATED: Performed by: EMERGENCY MEDICINE

## 2023-07-31 RX ORDER — POLYETHYLENE GLYCOL 3350 17 G/17G
17 POWDER, FOR SOLUTION ORAL DAILY PRN
Status: DISCONTINUED | OUTPATIENT
Start: 2023-07-31 | End: 2023-08-02 | Stop reason: HOSPADM

## 2023-07-31 RX ORDER — HYDRALAZINE HYDROCHLORIDE 20 MG/ML
10 INJECTION INTRAMUSCULAR; INTRAVENOUS ONCE
Status: COMPLETED | OUTPATIENT
Start: 2023-07-31 | End: 2023-07-31

## 2023-07-31 RX ORDER — SODIUM CHLORIDE 9 MG/ML
40 INJECTION, SOLUTION INTRAVENOUS AS NEEDED
Status: DISCONTINUED | OUTPATIENT
Start: 2023-07-31 | End: 2023-08-02 | Stop reason: HOSPADM

## 2023-07-31 RX ORDER — SODIUM CHLORIDE 9 MG/ML
100 INJECTION, SOLUTION INTRAVENOUS CONTINUOUS
Status: DISCONTINUED | OUTPATIENT
Start: 2023-07-31 | End: 2023-08-01

## 2023-07-31 RX ORDER — BISACODYL 10 MG
10 SUPPOSITORY, RECTAL RECTAL DAILY PRN
Status: DISCONTINUED | OUTPATIENT
Start: 2023-07-31 | End: 2023-08-02 | Stop reason: HOSPADM

## 2023-07-31 RX ORDER — AMOXICILLIN 250 MG
2 CAPSULE ORAL 2 TIMES DAILY
Status: DISCONTINUED | OUTPATIENT
Start: 2023-08-01 | End: 2023-08-02 | Stop reason: HOSPADM

## 2023-07-31 RX ORDER — SODIUM CHLORIDE 0.9 % (FLUSH) 0.9 %
10 SYRINGE (ML) INJECTION AS NEEDED
Status: DISCONTINUED | OUTPATIENT
Start: 2023-07-31 | End: 2023-08-02 | Stop reason: HOSPADM

## 2023-07-31 RX ORDER — CLONIDINE HYDROCHLORIDE 0.1 MG/1
0.2 TABLET ORAL ONCE
Status: COMPLETED | OUTPATIENT
Start: 2023-07-31 | End: 2023-07-31

## 2023-07-31 RX ORDER — NITROGLYCERIN 0.4 MG/1
0.4 TABLET SUBLINGUAL
Status: DISCONTINUED | OUTPATIENT
Start: 2023-07-31 | End: 2023-08-02 | Stop reason: HOSPADM

## 2023-07-31 RX ORDER — SODIUM CHLORIDE 0.9 % (FLUSH) 0.9 %
10 SYRINGE (ML) INJECTION EVERY 12 HOURS SCHEDULED
Status: DISCONTINUED | OUTPATIENT
Start: 2023-08-01 | End: 2023-08-02 | Stop reason: HOSPADM

## 2023-07-31 RX ORDER — BISACODYL 5 MG/1
5 TABLET, DELAYED RELEASE ORAL DAILY PRN
Status: DISCONTINUED | OUTPATIENT
Start: 2023-07-31 | End: 2023-08-02 | Stop reason: HOSPADM

## 2023-07-31 RX ORDER — ENOXAPARIN SODIUM 100 MG/ML
30 INJECTION SUBCUTANEOUS EVERY 24 HOURS
Status: DISCONTINUED | OUTPATIENT
Start: 2023-07-31 | End: 2023-08-02 | Stop reason: HOSPADM

## 2023-07-31 RX ADMIN — ENOXAPARIN SODIUM 30 MG: 30 INJECTION SUBCUTANEOUS at 23:29

## 2023-07-31 RX ADMIN — HYDRALAZINE HYDROCHLORIDE 10 MG: 20 INJECTION INTRAMUSCULAR; INTRAVENOUS at 13:00

## 2023-07-31 RX ADMIN — CLONIDINE HYDROCHLORIDE 0.2 MG: 0.1 TABLET ORAL at 13:01

## 2023-07-31 RX ADMIN — HYDRALAZINE HYDROCHLORIDE 10 MG: 20 INJECTION INTRAMUSCULAR; INTRAVENOUS at 17:37

## 2023-07-31 RX ADMIN — SODIUM CHLORIDE 250 ML/HR: 9 INJECTION, SOLUTION INTRAVENOUS at 16:25

## 2023-07-31 RX ADMIN — HYDRALAZINE HYDROCHLORIDE 10 MG: 20 INJECTION INTRAMUSCULAR; INTRAVENOUS at 20:11

## 2023-07-31 RX ADMIN — KIT FOR THE PREPARATION OF TECHNETIUM TC 99M ALBUMIN AGGREGATED 1 DOSE: 2.5 INJECTION, POWDER, FOR SOLUTION INTRAVENOUS at 19:41

## 2023-07-31 RX ADMIN — NICARDIPINE HYDROCHLORIDE 5 MG/HR: 0.1 INJECTION, SOLUTION INTRAVENOUS at 22:28

## 2023-07-31 RX ADMIN — KIT FOR THE PREPARATION OF TECHNETIUM TC 99M PENTETATE 1 DOSE: 20 INJECTION, POWDER, LYOPHILIZED, FOR SOLUTION INTRAVENOUS; RESPIRATORY (INHALATION) at 19:25

## 2023-07-31 RX ADMIN — Medication 10 ML: at 23:27

## 2023-07-31 RX ADMIN — SODIUM CHLORIDE 250 ML/HR: 9 INJECTION, SOLUTION INTRAVENOUS at 23:26

## 2023-07-31 NOTE — ED PROVIDER NOTES
Subjective   History of Present Illness    Patient presents complaining of a 3-day history of shortness of breath.  Patient called her PCP and was going to get followed up but then she also developed some chest pain today.  In the office patient PCP did give the patient nitroglycerin but had no major improvement and chest pain.  Patient also noted to be extremely hypertensive.  Patient denies any exertional chest pain leading up to this episode.  Patient denies any recent travel, sick contacts, bad food exposure, or trauma.  Nothing seems to make it better or worse.  Patient denies any known heavy lifting or straining that could have caused any of her symptoms.  Patient said everything started gradually.  Patient denies any near syncope, vision changes, headache, fever, or cough.  No therapy taken prior to arrival.    Review of Systems   All other systems reviewed and are negative.    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 04/16/2021    History of blood clots     Hx of radiation therapy     2001, left breast ca    Hyperlipidemia 04/16/2021    Hypertension 04/16/2021    S/P CABG (coronary artery bypass graft) 10/12/2022       Allergies   Allergen Reactions    Prednisone Unknown - Low Severity       Past Surgical History:   Procedure Laterality Date    BREAST BIOPSY      BREAST LUMPECTOMY Left 2011    CARDIAC SURGERY      CARPAL TUNNEL RELEASE      HYSTERECTOMY      TONSILLECTOMY         Family History   Problem Relation Age of Onset    Stroke Mother     Stroke Brother     Breast cancer Neg Hx        Social History     Socioeconomic History    Marital status:     Number of children: 4   Tobacco Use    Smoking status: Former     Types: Cigarettes    Smokeless tobacco: Never   Vaping Use    Vaping Use: Never used   Substance and  Sexual Activity    Alcohol use: No     Comment: caffeine use    Drug use: No    Sexual activity: Defer           Objective   Physical Exam  Vitals and nursing note reviewed.   Constitutional:       Appearance: She is well-developed.      Comments: Patient sitting in bed comfortably.  Talkative, pleasant.  No signs of distress.   HENT:      Head: Normocephalic.      Mouth/Throat:      Mouth: Mucous membranes are moist.   Eyes:      Pupils: Pupils are equal, round, and reactive to light.   Neck:      Vascular: No JVD.   Cardiovascular:      Rate and Rhythm: Normal rate and regular rhythm.      Pulses:           Radial pulses are 2+ on the right side and 2+ on the left side.        Dorsalis pedis pulses are 2+ on the right side and 2+ on the left side.        Posterior tibial pulses are 2+ on the right side and 2+ on the left side.      Heart sounds: S1 normal and S2 normal. Murmur heard.   Systolic murmur is present with a grade of 4/6.     No friction rub.   Pulmonary:      Effort: Pulmonary effort is normal.      Breath sounds: Normal breath sounds. No decreased breath sounds, wheezing, rhonchi or rales.   Chest:      Chest wall: No tenderness or crepitus.   Abdominal:      Palpations: Abdomen is soft.   Musculoskeletal:      Cervical back: Neck supple.      Right lower leg: No edema.      Left lower leg: No edema.   Skin:     General: Skin is warm and dry.      Capillary Refill: Capillary refill takes 2 to 3 seconds.   Neurological:      Mental Status: She is alert and oriented to person, place, and time.   Psychiatric:         Mood and Affect: Mood normal.         Behavior: Behavior normal.       Procedures           ED Course  ED Course as of 08/01/23 1002   Mon Jul 31, 2023   1515 15:10 care assumed from Dr. Hough. [TP]   3982 The patient gives me a history that she has been short of breath for 2 to 3 days but better now.  She gives me a history that she had fleeting sharp chest pain this morning.  On my  exam the patient has an oxygen saturation of 94% on room air.  Her lungs are clear to auscultation.  She has a 4-5/6 harsh holosystolic murmur that is significantly louder than documented by Dr. Gonzalez last October.  Reviewing her records in [facility] she had a myocardial perfusion stress test 10/22/2022 that showed a moderately severe area of ischemia in the inferior and lateral wall.  She had an echocardiogram performed 9/2/2022 that had a normal EF of 70%.  A calcified trileaflet aortic valve was noted.  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 [TP]   1546 My interpretation of the patient's EKG tracing performed 12: 42 while the patient was under Dr. Hough's care is normal sinus rhythm with a rate of 63, normal axis, left ventricular hypertrophy by voltage, no acute ST segment elevation or depression consistent with ischemia, normal R wave transition, normal ID and QT intervals.  The EKG is not significantly changed in comparison to tracing earlier today at 11: 01. [TP]   1547 The patient's chest x-ray was interpreted by me and the radiologist as no acute disease. [TP]   1548 Review the patient's laboratory studies: The patient's CBC had an elevated white count of 18.18 with a left shift.  Hemoglobin, hematocrit and platelets within normal limits.  High sensitive troponin was not 41 and delta high-sensitivity troponin was 39 elevated in the indeterminate range.  No old values are available for comparison.  The patient's proBNP is elevated at 5601.  Again no old values are available for comparison.  CMP has an elevated creatinine of 1.7 with an elevated BUN of 46 and a low GFR 28.5 which is a significant change from a year ago when her BUN was 22 and creatinine 1.18.  Electrolytes and liver enzymes are essentially within normal limits.  The patient's COVID and flu PCR's are negative. [TP]   1549 The patient was noted to have markedly elevated blood pressure on arrival 269/129 for which Dr. Hough  administered clonidine and hydralazine and her blood pressure was 127/80 on my assumption of care. [TP]   1622 Patient's urinalysis has trace leukocyte esterases and 3-5 WBCs which is borderline for a UTI. [TP]   1622 The patient's D-dimer is elevated at 6.  She gives me history of previous DVTs.  Her renal function is too low to support a CT angiogram of the chest.  A VQ scan was ordered. [TP]   1622 IV fluids for the patient's acute kidney injury were initiated with saline 250 cc/h.     [TP]   2208 The patient received multiple doses of hydralazine to keep her blood pressure under control. [TP]   2208 21:20 patient discussed with Dr. Reyna, cardiologist on-call, who recommended the patient be admitted preferably to [facility] should interventions be needed, but if the patient was not agreeable then to this facility and they can see the patient in consult. [TP]   2209 I discussed with the patient and her 2 sons Dr. Reyna's recommendations for admission to [facility], however they decided they would prefer that the patient be admitted here.  They stated they would discuss with the cardiologist whether or not she needs more invasive interventions such as cardiac catheterization. [TP]   2209 The patient will be need to be admitted to address her acute kidney injury, accelerated hypertension and further evaluation of her indeterminate VQ scan. [TP]   2210 22: 06 patient discussed with Dr. Plascencia, hospitalist, who agreed to admit the patient.  He recommended the patient be placed on a Cardene drip. [TP]      ED Course User Index  [TP] Markos Lu MD                                      [facility] data is unavailable. This was reviewed by Chilo Plascencia MD, Chang Hough MD, Markos Lu MD    Medical Decision Making  Ddx pleural effusion, pneumothorax, pneumonia, viral syndrome, bronchitis, ACS, costochondritis, pleurisy, congestive heart failure    NM Lung Ventilation Perfusion    Result Date:  7/31/2023  1.  No unmatched perfusion defect is identified to suggest pulmonary embolism. 2.  Extensive multifocal ventilatory abnormality with corresponding perfusion defects. On this basis, the examination is considered indeterminate for the exclusion of pulmonary embolism. 3.  Consider CTA of the chest using PE protocol if there is high clinical suspicion for PE and if it would alter clinical management.   This report was finalized on 7/31/2023 8:55 PM by Dr. Chino Jacobson MD.      XR Chest 1 View    Result Date: 7/31/2023  No active disease  This report was finalized on 7/31/2023 1:37 PM by Dr. Paras Mercedes MD.      US Venous Doppler Lower Extremity Bilateral (duplex)    Result Date: 8/1/2023  Negative examination.  No evidence of lower extremity DVT on the right or left.  This report was finalized on 8/1/2023 8:41 AM by Dr. Mitchell Oviedo MD.      Labs Reviewed  COMPREHENSIVE METABOLIC PANEL - Abnormal; Notable for the following components:     Glucose                       107 (*)                BUN                           46 (*)                 Creatinine                    1.70 (*)               ALT (SGPT)                    42 (*)                 BUN/Creatinine Ratio          27.1 (*)               eGFR                          28.5 (*)            All other components within normal limits         Narrative: GFR Normal >60                  Chronic Kidney Disease <60                  Kidney Failure <15                                    The GFR formula is only valid for adults with stable renal function between ages 18 and 70.  BNP (IN-HOUSE) - Abnormal; Notable for the following components:     proBNP                        5,601.0 (*)            All other components within normal limits         Narrative: Among patients with dyspnea, NT-proBNP is highly sensitive for the detection of acute congestive heart failure. In addition NT-proBNP of <300 pg/ml effectively rules out acute congestive heart failure  with 99% negative predictive value.                                    Results may be falsely decreased if patient taking Biotin.                    TROPONIN - Abnormal; Notable for the following components:     HS Troponin T                 41 (*)              All other components within normal limits         Narrative: High Sensitive Troponin T Reference Range:                  <10.0 ng/L- Negative Female for AMI                  <15.0 ng/L- Negative Male for AMI                  >=10 - Abnormal Female indicating possible myocardial injury.                  >=15 - Abnormal Male indicating possible myocardial injury.                   Clinicians would have to utilize clinical acumen, EKG, Troponin, and serial changes to determine if it is an Acute Myocardial Infarction or myocardial injury due to an underlying chronic condition.                                       CBC WITH AUTO DIFFERENTIAL - Abnormal; Notable for the following components:     WBC                           18.18 (*)               MCHC                          30.7 (*)               Neutrophil %                  77.1 (*)               Lymphocyte %                  10.5 (*)               Immature Grans %              0.7 (*)                Neutrophils, Absolute         14.02 (*)               Monocytes, Absolute           1.75 (*)               Immature Grans, Absolute      0.12 (*)            All other components within normal limits  HIGH SENSITIVITIY TROPONIN T 2HR - Abnormal; Notable for the following components:     HS Troponin T                 39 (*)              All other components within normal limits         Narrative: High Sensitive Troponin T Reference Range:                  <10.0 ng/L- Negative Female for AMI                  <15.0 ng/L- Negative Male for AMI                  >=10 - Abnormal Female indicating possible myocardial injury.                  >=15 - Abnormal Male indicating possible myocardial injury.                    "Clinicians would have to utilize clinical acumen, EKG, Troponin, and serial changes to determine if it is an Acute Myocardial Infarction or myocardial injury due to an underlying chronic condition.                                       URINALYSIS W/ MICROSCOPIC IF INDICATED (NO CULTURE) - Abnormal; Notable for the following components:     Appearance, UA                  (*)                  Protein, UA                     (*)                  Leuk Esterase, UA             Trace (*)            All other components within normal limits  D-DIMER, QUANTITATIVE - Abnormal; Notable for the following components:     D-Dimer, Quantitative         6.09 (*)            All other components within normal limits         Narrative: According to the assay 's published package insert, a normal (<0.50 MCGFEU/mL) D-dimer result in conjunction with a non-high clinical probability assessment, excludes deep vein thrombosis (DVT) and pulmonary embolism (PE) with high sensitivity.                                    D-dimer values increase with age and this can make VTE exclusion of an older population difficult. To address this, the [employer], based on best available evidence and recent guidelines, recommends that clinicians use age-adjusted D-dimer thresholds in patients greater than 50 years of age with: a) a low probability of PE who do not meet all Pulmonary Embolism Rule Out Criteria, or b) in those with intermediate probability of PE.                   The formula for an age-adjusted D-dimer cut-off is \"age/100\".                  For example, a 60 year old patient would have an age-adjusted cut-off of 0.60 MCGFEU/mL and an 80 year old 0.80 MCGFEU/mL.  URINALYSIS, MICROSCOPIC ONLY - Abnormal; Notable for the following components:     RBC, UA                       0-2 (*)                WBC, UA                       3-5 (*)                Squamous Epithelial Cells, UA   3-6 (*)             All other " components within normal limits  RENAL FUNCTION PANEL - Abnormal; Notable for the following components:     Glucose                       114 (*)                BUN                           38 (*)                 Creatinine                    1.42 (*)               Sodium                        132 (*)                Albumin                       3.4 (*)                BUN/Creatinine Ratio          26.8 (*)               eGFR                          35.4 (*)            All other components within normal limits         Narrative: GFR Normal >60                  Chronic Kidney Disease <60                  Kidney Failure <15                                    The GFR formula is only valid for adults with stable renal function between ages 18 and 70.  LIPID PANEL - Abnormal; Notable for the following components:     Total Cholesterol             221 (*)                HDL Cholesterol               77 (*)                 LDL Cholesterol               124 (*)             All other components within normal limits         Narrative: Cholesterol Reference Ranges                  (U.S. Department of Health and Human Services ATP III Classifications)                                    Desirable          <200 mg/dL                  Borderline High    200-239 mg/dL                  High Risk          >240 mg/dL                                                      Triglyceride Reference Ranges                  (U.S. Department of Health and Human Services ATP III Classifications)                                    Normal           <150 mg/dL                  Borderline High  150-199 mg/dL                  High             200-499 mg/dL                  Very High        >500 mg/dL                                    HDL Reference Ranges                  (U.S. Department of Health and Human Services ATP III Classifications)                                    Low     <40 mg/dl (major risk factor for CHD)                  High     >60 mg/dl ('negative' risk factor for CHD)                                                                        LDL Reference Ranges                  (U.S. Department of Health and Human Services ATP III Classifications)                                    Optimal          <100 mg/dL                  Near Optimal     100-129 mg/dL                  Borderline High  130-159 mg/dL                  High             160-189 mg/dL                  Very High        >189 mg/dL  BASIC METABOLIC PANEL - Abnormal; Notable for the following components:     Glucose                       114 (*)                BUN                           34 (*)                 Creatinine                    1.29 (*)               BUN/Creatinine Ratio          26.4 (*)               eGFR                          39.8 (*)            All other components within normal limits         Narrative: GFR Normal >60                  Chronic Kidney Disease <60                  Kidney Failure <15                                    The GFR formula is only valid for adults with stable renal function between ages 18 and 70.  CBC WITH AUTO DIFFERENTIAL - Abnormal; Notable for the following components:     WBC                           15.85 (*)               RBC                           3.61 (*)               Hemoglobin                    10.7 (*)               Hematocrit                    33.5 (*)               Neutrophil %                  82.0 (*)               Lymphocyte %                  7.9 (*)                Immature Grans %              0.8 (*)                Neutrophils, Absolute         12.98 (*)               Monocytes, Absolute           1.33 (*)               Immature Grans, Absolute      0.13 (*)            All other components within normal limits  COVID-19 AND FLU A/B, NP SWAB IN TRANSPORT MEDIA 8-12 HR TAT - Normal         Narrative: Fact sheet for providers: https://www.fda.gov/media/418623/download                                    Fact  "sheet for patients: https://www.[employer].gov/media/717676/download                                    Test performed by PCR.  PROCALCITONIN - Normal         Narrative: As a Marker for Sepsis (Non-Neonates):                                    1. <0.5 ng/mL represents a low risk of severe sepsis and/or septic shock.                  2. >2 ng/mL represents a high risk of severe sepsis and/or septic shock.                                    As a Marker for Lower Respiratory Tract Infections that require antibiotic therapy:                                    PCT on Admission    Antibiotic Therapy       6-12 Hrs later                                    >0.5                Strongly Recommended                  >0.25 - <0.5        Recommended                   0.1 - 0.25          Discouraged              Remeasure/reassess PCT                  <0.1                Strongly Discouraged     Remeasure/reassess PCT                                    As 28 day mortality risk marker: \"Change in Procalcitonin Result\" (>80% or <=80%) if Day 0 (or Day 1) and Day 4 values are available. Refer to http://www.[employer].com-pct-calculator.[employer]                                    Change in PCT <=80%                  A decrease of PCT levels below or equal to 80% defines a positive change in PCT test result representing a higher risk for 28-day all-cause mortality of patients diagnosed with severe sepsis for septic shock.                                    Change in PCT >80%                  A decrease of PCT levels of more than 80% defines a negative change in PCT result representing a lower risk for 28-day all-cause mortality of patients diagnosed with severe sepsis or septic shock.                     TSH - Normal  COVID PRE-OP / PRE-PROCEDURE SCREENING ORDER (NO ISOLATION)         Narrative: The following orders were created for panel order COVID PRE-OP / PRE-PROCEDURE SCREENING ORDER (NO ISOLATION) - Swab, Nasopharynx.                  Procedure     "                           Abnormality         Status                                     ---------                               -----------         ------                                     COVID-19 and FLU A/B PCR...[[id number]]  Normal              Final result                                                 Please view results for these tests on the individual orders.  SCAN SLIDE  CBC AND DIFFERENTIAL         Narrative: The following orders were created for panel order CBC & Differential.                  Procedure                               Abnormality         Status                                     ---------                               -----------         ------                                     CBC Auto Differential[[id number]]        Abnormal            Final result                               Scan Slide[[id number]]                                       Final result                                                 Please view results for these tests on the individual orders.  CBC AND DIFFERENTIAL      Problems Addressed:  Accelerated hypertension: complicated acute illness or injury  Acute kidney injury: complicated acute illness or injury  Chest pain, unspecified type: complicated acute illness or injury  Elevated d-dimer: complicated acute illness or injury  Heart murmur, systolic: complicated acute illness or injury  Shortness of breath: complicated acute illness or injury    Amount and/or Complexity of Data Reviewed  Labs: ordered.  Radiology: ordered.  ECG/medicine tests: ordered.    Risk  Prescription drug management.  Decision regarding hospitalization.        Final diagnoses:   Chest pain, unspecified type   Accelerated hypertension   Acute kidney injury   Elevated d-dimer   Heart murmur, systolic   Shortness of breath       ED Disposition  ED Disposition       ED Disposition   Decision to Admit    Condition   --    Comment   Level of Care: Critical Care [6]   Diagnosis: Chest pain  [[id number]]   Admitting Physician: LISA WATTS [[id number]]   Bed Request Comments: Chest pain, shortness of breath, accelerated hypertension, acute kidney injury, abnormal VQ scan, worsening heart murmur   Certification: I Certify That Inpatient Hospital Services Are Medically Necessary For Greater Than 2 Midnights                 No follow-up provider specified.       Medication List      No changes were made to your prescriptions during this visit.            Chang Hough MD  07/31/23 5068       Chang Hough MD  08/01/23 1004

## 2023-08-01 ENCOUNTER — APPOINTMENT (OUTPATIENT)
Dept: CARDIOLOGY | Facility: HOSPITAL | Age: 88
End: 2023-08-01
Payer: MEDICARE

## 2023-08-01 ENCOUNTER — APPOINTMENT (OUTPATIENT)
Dept: ULTRASOUND IMAGING | Facility: HOSPITAL | Age: 88
End: 2023-08-01
Payer: MEDICARE

## 2023-08-01 PROBLEM — N39.0 ACUTE UTI: Status: RESOLVED | Noted: 2021-04-12 | Resolved: 2023-08-01

## 2023-08-01 PROBLEM — N18.31 ACUTE RENAL FAILURE SUPERIMPOSED ON STAGE 3A CHRONIC KIDNEY DISEASE: Status: ACTIVE | Noted: 2023-08-01

## 2023-08-01 PROBLEM — N17.9 ACUTE RENAL FAILURE SUPERIMPOSED ON STAGE 3A CHRONIC KIDNEY DISEASE: Status: ACTIVE | Noted: 2023-08-01

## 2023-08-01 LAB
ALBUMIN SERPL-MCNC: 3.4 G/DL (ref 3.5–5.2)
ANION GAP SERPL CALCULATED.3IONS-SCNC: 10.2 MMOL/L (ref 5–15)
ANION GAP SERPL CALCULATED.3IONS-SCNC: 11.3 MMOL/L (ref 5–15)
AORTIC DIMENSIONLESS INDEX: 0.5 (DI)
BASOPHILS # BLD AUTO: 0.05 10*3/MM3 (ref 0–0.2)
BASOPHILS NFR BLD AUTO: 0.3 % (ref 0–1.5)
BH CV ECHO MEAS - ACS: 1.02 CM
BH CV ECHO MEAS - AO MAX PG: 35.3 MMHG
BH CV ECHO MEAS - AO MEAN PG: 21.9 MMHG
BH CV ECHO MEAS - AO ROOT DIAM: 3 CM
BH CV ECHO MEAS - AO V2 MAX: 297 CM/SEC
BH CV ECHO MEAS - AO V2 VTI: 45.6 CM
BH CV ECHO MEAS - AVA(I,D): 1.75 CM2
BH CV ECHO MEAS - EDV(CUBED): 46.7 ML
BH CV ECHO MEAS - EDV(MOD-SP2): 82 ML
BH CV ECHO MEAS - EDV(MOD-SP4): 101 ML
BH CV ECHO MEAS - EF(MOD-BP): 65 %
BH CV ECHO MEAS - EF(MOD-SP2): 67.1 %
BH CV ECHO MEAS - EF(MOD-SP4): 65.3 %
BH CV ECHO MEAS - ESV(CUBED): 13.1 ML
BH CV ECHO MEAS - ESV(MOD-SP2): 27 ML
BH CV ECHO MEAS - ESV(MOD-SP4): 35 ML
BH CV ECHO MEAS - FS: 34.6 %
BH CV ECHO MEAS - IVS/LVPW: 0.79 CM
BH CV ECHO MEAS - IVSD: 1.5 CM
BH CV ECHO MEAS - LA DIMENSION: 4.1 CM
BH CV ECHO MEAS - LAT PEAK E' VEL: 11.6 CM/SEC
BH CV ECHO MEAS - LV DIASTOLIC VOL/BSA (35-75): 54.3 CM2
BH CV ECHO MEAS - LV MASS(C)D: 247.2 GRAMS
BH CV ECHO MEAS - LV MAX PG: 8.6 MMHG
BH CV ECHO MEAS - LV MEAN PG: 5 MMHG
BH CV ECHO MEAS - LV SYSTOLIC VOL/BSA (12-30): 18.8 CM2
BH CV ECHO MEAS - LV V1 MAX: 147 CM/SEC
BH CV ECHO MEAS - LV V1 VTI: 24.7 CM
BH CV ECHO MEAS - LVIDD: 3.6 CM
BH CV ECHO MEAS - LVIDS: 2.36 CM
BH CV ECHO MEAS - LVOT AREA: 3.2 CM2
BH CV ECHO MEAS - LVOT DIAM: 2.03 CM
BH CV ECHO MEAS - LVPWD: 1.9 CM
BH CV ECHO MEAS - MED PEAK E' VEL: 8.3 CM/SEC
BH CV ECHO MEAS - MV A DUR: 0.11 SEC
BH CV ECHO MEAS - MV A MAX VEL: 103 CM/SEC
BH CV ECHO MEAS - MV DEC SLOPE: 491.4 CM/SEC2
BH CV ECHO MEAS - MV DEC TIME: 187 MSEC
BH CV ECHO MEAS - MV E MAX VEL: 115 CM/SEC
BH CV ECHO MEAS - MV E/A: 1.12
BH CV ECHO MEAS - MV MAX PG: 6.8 MMHG
BH CV ECHO MEAS - MV MEAN PG: 3.5 MMHG
BH CV ECHO MEAS - MV P1/2T: 73.6 MSEC
BH CV ECHO MEAS - MV V2 VTI: 32.7 CM
BH CV ECHO MEAS - MVA(P1/2T): 3 CM2
BH CV ECHO MEAS - MVA(VTI): 2.44 CM2
BH CV ECHO MEAS - PA ACC TIME: 0.12 SEC
BH CV ECHO MEAS - PI END-D VEL: 120.3 CM/SEC
BH CV ECHO MEAS - PULM A REVS VEL: 17.3 CM/SEC
BH CV ECHO MEAS - PULM DIAS VEL: 62.9 CM/SEC
BH CV ECHO MEAS - PULM S/D: 1.16
BH CV ECHO MEAS - PULM SYS VEL: 73 CM/SEC
BH CV ECHO MEAS - QP/QS: 1.19
BH CV ECHO MEAS - RV MAX PG: 6.3 MMHG
BH CV ECHO MEAS - RV V1 MAX: 125.2 CM/SEC
BH CV ECHO MEAS - RV V1 VTI: 22 CM
BH CV ECHO MEAS - RVOT DIAM: 2.35 CM
BH CV ECHO MEAS - SI(MOD-SP2): 29.5 ML/M2
BH CV ECHO MEAS - SI(MOD-SP4): 35.5 ML/M2
BH CV ECHO MEAS - SV(LVOT): 80 ML
BH CV ECHO MEAS - SV(MOD-SP2): 55 ML
BH CV ECHO MEAS - SV(MOD-SP4): 66 ML
BH CV ECHO MEAS - SV(RVOT): 95.3 ML
BH CV ECHO MEAS - TAPSE (>1.6): 1.88 CM
BH CV ECHO MEASUREMENTS AVERAGE E/E' RATIO: 11.56
BH CV XLRA - RV BASE: 3.1 CM
BH CV XLRA - RV LENGTH: 6.6 CM
BH CV XLRA - RV MID: 2.2 CM
BH CV XLRA - TDI S': 17 CM/SEC
BUN SERPL-MCNC: 34 MG/DL (ref 8–23)
BUN SERPL-MCNC: 38 MG/DL (ref 8–23)
BUN/CREAT SERPL: 26.4 (ref 7–25)
BUN/CREAT SERPL: 26.8 (ref 7–25)
CALCIUM SPEC-SCNC: 9 MG/DL (ref 8.6–10.5)
CALCIUM SPEC-SCNC: 9.1 MG/DL (ref 8.6–10.5)
CHLORIDE SERPL-SCNC: 100 MMOL/L (ref 98–107)
CHLORIDE SERPL-SCNC: 98 MMOL/L (ref 98–107)
CO2 SERPL-SCNC: 22.7 MMOL/L (ref 22–29)
CO2 SERPL-SCNC: 25.8 MMOL/L (ref 22–29)
CREAT SERPL-MCNC: 1.29 MG/DL (ref 0.57–1)
CREAT SERPL-MCNC: 1.42 MG/DL (ref 0.57–1)
DEPRECATED RDW RBC AUTO: 49.4 FL (ref 37–54)
EGFRCR SERPLBLD CKD-EPI 2021: 35.4 ML/MIN/1.73
EGFRCR SERPLBLD CKD-EPI 2021: 39.8 ML/MIN/1.73
EOSINOPHIL # BLD AUTO: 0.1 10*3/MM3 (ref 0–0.4)
EOSINOPHIL NFR BLD AUTO: 0.6 % (ref 0.3–6.2)
ERYTHROCYTE [DISTWIDTH] IN BLOOD BY AUTOMATED COUNT: 14.9 % (ref 12.3–15.4)
GLUCOSE SERPL-MCNC: 114 MG/DL (ref 65–99)
GLUCOSE SERPL-MCNC: 114 MG/DL (ref 65–99)
HCT VFR BLD AUTO: 33.5 % (ref 34–46.6)
HGB BLD-MCNC: 10.7 G/DL (ref 12–15.9)
IMM GRANULOCYTES # BLD AUTO: 0.13 10*3/MM3 (ref 0–0.05)
IMM GRANULOCYTES NFR BLD AUTO: 0.8 % (ref 0–0.5)
LYMPHOCYTES # BLD AUTO: 1.26 10*3/MM3 (ref 0.7–3.1)
LYMPHOCYTES NFR BLD AUTO: 7.9 % (ref 19.6–45.3)
MCH RBC QN AUTO: 29.6 PG (ref 26.6–33)
MCHC RBC AUTO-ENTMCNC: 31.9 G/DL (ref 31.5–35.7)
MCV RBC AUTO: 92.8 FL (ref 79–97)
MONOCYTES # BLD AUTO: 1.33 10*3/MM3 (ref 0.1–0.9)
MONOCYTES NFR BLD AUTO: 8.4 % (ref 5–12)
NEUTROPHILS NFR BLD AUTO: 12.98 10*3/MM3 (ref 1.7–7)
NEUTROPHILS NFR BLD AUTO: 82 % (ref 42.7–76)
NRBC BLD AUTO-RTO: 0 /100 WBC (ref 0–0.2)
PHOSPHATE SERPL-MCNC: 2.9 MG/DL (ref 2.5–4.5)
PLATELET # BLD AUTO: 241 10*3/MM3 (ref 140–450)
PMV BLD AUTO: 10.3 FL (ref 6–12)
POTASSIUM SERPL-SCNC: 3.7 MMOL/L (ref 3.5–5.2)
POTASSIUM SERPL-SCNC: 4.8 MMOL/L (ref 3.5–5.2)
QT INTERVAL: 442 MS
RBC # BLD AUTO: 3.61 10*6/MM3 (ref 3.77–5.28)
SINUS: 2.6 CM
SODIUM SERPL-SCNC: 132 MMOL/L (ref 136–145)
SODIUM SERPL-SCNC: 136 MMOL/L (ref 136–145)
STJ: 2.6 CM
WBC NRBC COR # BLD: 15.85 10*3/MM3 (ref 3.4–10.8)

## 2023-08-01 PROCEDURE — 93306 TTE W/DOPPLER COMPLETE: CPT | Performed by: INTERNAL MEDICINE

## 2023-08-01 PROCEDURE — 94761 N-INVAS EAR/PLS OXIMETRY MLT: CPT

## 2023-08-01 PROCEDURE — 96366 THER/PROPH/DIAG IV INF ADDON: CPT

## 2023-08-01 PROCEDURE — 96361 HYDRATE IV INFUSION ADD-ON: CPT

## 2023-08-01 PROCEDURE — 80069 RENAL FUNCTION PANEL: CPT | Performed by: HOSPITALIST

## 2023-08-01 PROCEDURE — 94799 UNLISTED PULMONARY SVC/PX: CPT

## 2023-08-01 PROCEDURE — 85025 COMPLETE CBC W/AUTO DIFF WBC: CPT | Performed by: HOSPITALIST

## 2023-08-01 PROCEDURE — 80048 BASIC METABOLIC PNL TOTAL CA: CPT | Performed by: HOSPITALIST

## 2023-08-01 PROCEDURE — 93970 EXTREMITY STUDY: CPT

## 2023-08-01 PROCEDURE — 25510000001 PERFLUTREN (DEFINITY) 8.476 MG IN SODIUM CHLORIDE (PF) 0.9 % 10 ML INJECTION: Performed by: HOSPITALIST

## 2023-08-01 PROCEDURE — 93306 TTE W/DOPPLER COMPLETE: CPT

## 2023-08-01 PROCEDURE — 99214 OFFICE O/P EST MOD 30 MIN: CPT | Performed by: INTERNAL MEDICINE

## 2023-08-01 RX ORDER — CLOPIDOGREL BISULFATE 75 MG/1
75 TABLET ORAL DAILY
Status: DISCONTINUED | OUTPATIENT
Start: 2023-08-01 | End: 2023-08-02 | Stop reason: HOSPADM

## 2023-08-01 RX ORDER — ISOSORBIDE MONONITRATE 30 MG/1
30 TABLET, EXTENDED RELEASE ORAL 2 TIMES DAILY
Status: DISCONTINUED | OUTPATIENT
Start: 2023-08-01 | End: 2023-08-02 | Stop reason: HOSPADM

## 2023-08-01 RX ORDER — BUSPIRONE HYDROCHLORIDE 5 MG/1
10 TABLET ORAL 3 TIMES DAILY
Status: DISCONTINUED | OUTPATIENT
Start: 2023-08-01 | End: 2023-08-02 | Stop reason: HOSPADM

## 2023-08-01 RX ORDER — ASCORBIC ACID 500 MG
500 TABLET ORAL DAILY
Status: DISCONTINUED | OUTPATIENT
Start: 2023-08-01 | End: 2023-08-02 | Stop reason: HOSPADM

## 2023-08-01 RX ORDER — ATORVASTATIN CALCIUM 40 MG/1
40 TABLET, FILM COATED ORAL NIGHTLY
Status: DISCONTINUED | OUTPATIENT
Start: 2023-08-01 | End: 2023-08-02 | Stop reason: HOSPADM

## 2023-08-01 RX ORDER — HYDROCHLOROTHIAZIDE 12.5 MG/1
12.5 TABLET ORAL
Status: DISCONTINUED | OUTPATIENT
Start: 2023-08-01 | End: 2023-08-02 | Stop reason: HOSPADM

## 2023-08-01 RX ORDER — HYDRALAZINE HYDROCHLORIDE 25 MG/1
25 TABLET, FILM COATED ORAL EVERY 12 HOURS SCHEDULED
Status: DISCONTINUED | OUTPATIENT
Start: 2023-08-01 | End: 2023-08-02 | Stop reason: HOSPADM

## 2023-08-01 RX ORDER — AMLODIPINE BESYLATE 5 MG/1
10 TABLET ORAL
Status: DISCONTINUED | OUTPATIENT
Start: 2023-08-01 | End: 2023-08-01

## 2023-08-01 RX ORDER — METOPROLOL TARTRATE 50 MG/1
50 TABLET, FILM COATED ORAL 2 TIMES DAILY
Status: DISCONTINUED | OUTPATIENT
Start: 2023-08-01 | End: 2023-08-02 | Stop reason: HOSPADM

## 2023-08-01 RX ORDER — METOPROLOL TARTRATE 50 MG/1
100 TABLET, FILM COATED ORAL EVERY 12 HOURS SCHEDULED
Status: DISCONTINUED | OUTPATIENT
Start: 2023-08-01 | End: 2023-08-01

## 2023-08-01 RX ORDER — LOSARTAN POTASSIUM 50 MG/1
100 TABLET ORAL
Status: DISCONTINUED | OUTPATIENT
Start: 2023-08-01 | End: 2023-08-02 | Stop reason: HOSPADM

## 2023-08-01 RX ORDER — ASPIRIN 81 MG/1
81 TABLET, CHEWABLE ORAL DAILY
Status: DISCONTINUED | OUTPATIENT
Start: 2023-08-01 | End: 2023-08-02 | Stop reason: HOSPADM

## 2023-08-01 RX ADMIN — HYDRALAZINE HYDROCHLORIDE 25 MG: 50 TABLET, FILM COATED ORAL at 20:04

## 2023-08-01 RX ADMIN — ASPIRIN 81 MG CHEWABLE TABLET 81 MG: 81 TABLET CHEWABLE at 14:45

## 2023-08-01 RX ADMIN — BUSPIRONE HYDROCHLORIDE 10 MG: 5 TABLET ORAL at 10:03

## 2023-08-01 RX ADMIN — METOPROLOL TARTRATE 100 MG: 50 TABLET, FILM COATED ORAL at 10:03

## 2023-08-01 RX ADMIN — ATORVASTATIN CALCIUM 40 MG: 40 TABLET, FILM COATED ORAL at 20:02

## 2023-08-01 RX ADMIN — LOSARTAN POTASSIUM 100 MG: 50 TABLET, FILM COATED ORAL at 14:45

## 2023-08-01 RX ADMIN — Medication 10 ML: at 20:05

## 2023-08-01 RX ADMIN — BUSPIRONE HYDROCHLORIDE 10 MG: 5 TABLET ORAL at 20:02

## 2023-08-01 RX ADMIN — OXYCODONE HYDROCHLORIDE AND ACETAMINOPHEN 500 MG: 500 TABLET ORAL at 10:03

## 2023-08-01 RX ADMIN — METOPROLOL TARTRATE 50 MG: 50 TABLET, FILM COATED ORAL at 20:02

## 2023-08-01 RX ADMIN — SODIUM CHLORIDE 2 ML: 9 INJECTION INTRAMUSCULAR; INTRAVENOUS; SUBCUTANEOUS at 10:43

## 2023-08-01 RX ADMIN — CLOPIDOGREL BISULFATE 75 MG: 75 TABLET ORAL at 14:45

## 2023-08-01 RX ADMIN — HYDROCHLOROTHIAZIDE 12.5 MG: 12.5 TABLET ORAL at 14:45

## 2023-08-01 RX ADMIN — SENNOSIDES AND DOCUSATE SODIUM 2 TABLET: 50; 8.6 TABLET ORAL at 20:02

## 2023-08-01 RX ADMIN — HYDRALAZINE HYDROCHLORIDE 25 MG: 50 TABLET, FILM COATED ORAL at 14:45

## 2023-08-01 RX ADMIN — AMLODIPINE BESYLATE 10 MG: 5 TABLET ORAL at 10:03

## 2023-08-01 RX ADMIN — ISOSORBIDE MONONITRATE 30 MG: 30 TABLET, EXTENDED RELEASE ORAL at 20:02

## 2023-08-01 RX ADMIN — Medication 10 ML: at 10:03

## 2023-08-01 RX ADMIN — BUSPIRONE HYDROCHLORIDE 10 MG: 5 TABLET ORAL at 15:15

## 2023-08-01 RX ADMIN — NICARDIPINE HYDROCHLORIDE 2.5 MG/HR: 0.1 INJECTION, SOLUTION INTRAVENOUS at 06:24

## 2023-08-01 NOTE — H&P
"[facility] HOSPITALIST     Tonny Willis MD    CHIEF COMPLAINT: Chest Pain; Elevated BP    HISTORY OF PRESENT ILLNESS:    Ms. King is a pleasant, 88 y/o  female who presented from her PCP's office due to elevated blood pressure readings.  She is hard-of-hearing and not a good historian so a majority of the HPI is taken from review of the notes.  Ms. King reports she was followed for about 30 years by Dr. Dai in [place].  She recently changed over to Dr. Willis because he cares for her .  She reports she had routine blood work done that was normal.  She also reports continued low back pain was bothering her today.  However, she also reports checking her blood pressure and her SBP was reading in the 200's.  She repeated her measurement about an hour after taking all of her medication.  It was still very high.  She presented to her PCP due to the pressure readings.  BP in the office was noted to be 179/126.  As noted she was seen earlier in the office, but she was also treated for \"bronchitis\" w/ a 5-day course of Prednisone which just stopped on Saturday.  She reports feeling \"bad\" in the office, but no documented fever of hypoxia is noted.  She has a vague complaint of chest pain.  She was also complaining of some dyspnea.  She was given a nitroglycerin and brought to the ER.    She reports decreased exercise tolerance by way of not being able to complete normal housework chores w/o having to take multiple breaks.  Her back pain also precludes some of this activity.  She denies difficulty sleeping and is eating and drinking normally.  She does reports some \"aches\" in her legs at night, but no weakness.  She does not describe swelling to me.  In fact, she feels like her leg pain developed after her vein harvesting for CABG.  There is an upcoming Cardiology appointment w/ Dr. Gonzalez described in the notes.      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 04/16/2021    History of blood clots     Hx of radiation therapy     2001, left breast ca    Hyperlipidemia     Hyperlipidemia 04/16/2021    Hypertension     Hypertension 04/16/2021    S/P CABG (coronary artery bypass graft) 10/12/2022     Past Surgical History:   Procedure Laterality Date    BREAST BIOPSY      BREAST LUMPECTOMY Left 2011    CARDIAC SURGERY      CARPAL TUNNEL RELEASE      HYSTERECTOMY      TONSILLECTOMY       Family History   Problem Relation Age of Onset    Stroke Mother     Stroke Brother     Breast cancer Neg Hx      Social History     Tobacco Use    Smoking status: Former     Types: Cigarettes    Smokeless tobacco: Never   Vaping Use    Vaping Use: Never used   Substance Use Topics    Alcohol use: No     Comment: caffeine use    Drug use: No     Medications Prior to Admission   Medication Sig Dispense Refill Last Dose    allopurinol (ZYLOPRIM) 300 MG tablet Take 300 mg by mouth Daily.       ascorbic acid (VITAMIN C) 500 MG tablet Take 500 mg by mouth Daily.       aspirin 81 MG chewable tablet Chew 81 mg Daily.       busPIRone (BUSPAR) 10 MG tablet Take 10 mg by mouth 3 (Three) Times a Day.       clopidogrel (PLAVIX) 75 MG tablet Take 75 mg by mouth Daily.       hydrALAZINE (APRESOLINE) 25 MG tablet Take 1 tablet by mouth Every 8 (Eight) Hours. 90 tablet 0     isosorbide mononitrate (IMDUR) 30 MG 24 hr tablet TAKE 1 TABLET BY MOUTH EVERY DAY 90 tablet 1     losartan-hydrochlorothiazide (HYZAAR) 100-12.5 MG per tablet Take 1 tablet by mouth Daily.       metoprolol tartrate (LOPRESSOR) 50 MG tablet Take 50 mg by mouth 2 (Two) Times a Day.       multivitamin with minerals tablet tablet Take 1 tablet by mouth Daily.       pravastatin (PRAVACHOL) 40 MG tablet Take 40 mg by mouth Every Night.       venlafaxine XR (EFFEXOR-XR) 75 MG 24 hr capsule Take 75 mg by mouth  "Daily.       vitamin D3 125 MCG (5000 UT) capsule capsule Take 5,000 Units by mouth Daily.        Allergies:  Patient has no known allergies.    REVIEW OF SYSTEMS:  Please see the above history of present illness for pertinent positives and negatives.  The remainder of the patient's systems have been reviewed and are negative.    Vital Signs  Temp:  [98 øF (36.7 øC)-98.2 øF (36.8 øC)] 98 øF (36.7 øC)  Heart Rate:  [59-75] 75  Resp:  [15-18] 16  BP: (127-234)/() 183/102  Oxygen Therapy  SpO2: 95 %  Pulse Oximetry Type: Continuous  Device (Oxygen Therapy): room air}  Body mass index is 29.63 kg/mý.    Flowsheet Rows      Flowsheet Row First Filed Value   Admission Height 162.6 cm (64\") Documented at 07/31/2023 1236   Admission Weight 88.2 kg (194 lb 8 oz) Documented at 07/31/2023 1236             Physical Exam:  Physical Exam   Constitutional: Patient appears well developed and well nourished and in no acute distress.  Appears stated age.  HEENT:   Head: Normocephalic and atraumatic.   Eyes:  Pupils are equal, round, and reactive to light. EOM are intact. Sclerae are anicteric and noninjected.  Mouth and Throat: Patient has dry mucous membranes. Oropharynx is clear of any erythema or exudate.     Neck: Neck supple. No JVD present. No thyromegaly present. No lymphadenopathy present.  Cardiovascular: Regular rate, regular rhythm, S1 normal and S2 normal.  Grade III/VI JESUS best at the LLSB.  Radial pulses are 2+ and symmetric.  Pulmonary/Chest: Lungs are diminished to auscultation bilaterally. No respiratory distress. No wheezes. No rhonchi. No rales.  She is speaking in full sentences.   Abdominal: Soft. Bowel sounds are normal. There is no tenderness.   Musculoskeletal: Normal muscle tone  Extremities: No edema.  There is some tenderness w/ palpation.  Neurological: Cranial nerves II-XII are grossly intact with no focal deficits.  Skin: Skin is warm. No rash noted. Nails show no clubbing.  No cyanosis or " "erythema.  No suspicious rash or lesion.    Emotional Behavior:    Judgment and Insight: Appears normal   Mental Status:  Alertness  Normal   Memory:  Unknown   Mood and Affect:         Depression  None               Anxiety  None    Debilities:   Physical Weakness  None   Handicaps  None   Disabilities  None   Agitation  None     Results Review:    I reviewed the patient's new clinical results.  Lab Results (most recent)       Procedure Component Value Units Date/Time    Procalcitonin [[id number]] Collected: 07/31/23 1504    Specimen: Blood Updated: 07/31/23 2309    D-dimer, Quantitative [[id number]]  (Abnormal) Collected: 07/31/23 1301    Specimen: Blood Updated: 07/31/23 1616     D-Dimer, Quantitative 6.09 MCGFEU/mL     Narrative:      According to the assay 's published package insert, a normal (<0.50 MCGFEU/mL) D-dimer result in conjunction with a non-high clinical probability assessment, excludes deep vein thrombosis (DVT) and pulmonary embolism (PE) with high sensitivity.    D-dimer values increase with age and this can make VTE exclusion of an older population difficult. To address this, the [employer], based on best available evidence and recent guidelines, recommends that clinicians use age-adjusted D-dimer thresholds in patients greater than 50 years of age with: a) a low probability of PE who do not meet all Pulmonary Embolism Rule Out Criteria, or b) in those with intermediate probability of PE.   The formula for an age-adjusted D-dimer cut-off is \"age/100\".  For example, a 60 year old patient would have an age-adjusted cut-off of 0.60 MCGFEU/mL and an 80 year old 0.80 MCGFEU/mL.    Urinalysis, Microscopic Only - Urine, Clean Catch [[id number]]  (Abnormal) Collected: 07/31/23 1538    Specimen: Urine, Clean Catch Updated: 07/31/23 1600     RBC, UA 0-2 /HPF      WBC, UA 3-5 /HPF      Bacteria, UA None Seen /HPF      Squamous Epithelial Cells, UA 3-6 /HPF      Hyaline Casts, UA " None Seen /LPF      Methodology Manual Light Microscopy    Urinalysis With Microscopic If Indicated (No Culture) - Urine, Clean Catch [[id number]]  (Abnormal) Collected: 07/31/23 1538    Specimen: Urine, Clean Catch Updated: 07/31/23 1600     Color, UA Yellow     Appearance, UA Slightly Cloudy     pH, UA 7.0     Specific Gravity, UA 1.020     Glucose, UA Negative     Ketones, UA Negative     Bilirubin, UA Negative     Blood, UA Negative     Protein, UA 30 mg/dL (1+)     Leuk Esterase, UA Trace     Nitrite, UA Negative     Urobilinogen, UA 0.2 E.U./dL    High Sensitivity Troponin T 2Hr [[id number]]  (Abnormal) Collected: 07/31/23 1504    Specimen: Blood Updated: 07/31/23 1536     HS Troponin T 39 ng/L      Troponin T Delta -2 ng/L     Narrative:      High Sensitive Troponin T Reference Range:  <10.0 ng/L- Negative Female for AMI  <15.0 ng/L- Negative Male for AMI  >=10 - Abnormal Female indicating possible myocardial injury.  >=15 - Abnormal Male indicating possible myocardial injury.   Clinicians would have to utilize clinical acumen, EKG, Troponin, and serial changes to determine if it is an Acute Myocardial Infarction or myocardial injury due to an underlying chronic condition.         COVID PRE-OP / PRE-PROCEDURE SCREENING ORDER (NO ISOLATION) - Swab, Nasopharynx [[id number]]  (Normal) Collected: 07/31/23 1414    Specimen: Swab from Nasopharynx Updated: 07/31/23 1459    Narrative:      The following orders were created for panel order COVID PRE-OP / PRE-PROCEDURE SCREENING ORDER (NO ISOLATION) - Swab, Nasopharynx.  Procedure                               Abnormality         Status                     ---------                               -----------         ------                     COVID-19 and FLU A/B PCR...[[id number]]  Normal              Final result                 Please view results for these tests on the individual orders.    COVID-19 and FLU A/B PCR - Swab, Nasopharynx [[id number]]  (Normal) Collected:  07/31/23 1414    Specimen: Swab from Nasopharynx Updated: 07/31/23 1459     COVID19 Not Detected     Influenza A PCR Not Detected     Influenza B PCR Not Detected    Narrative:      Fact sheet for providers: https://www.fda.gov/media/202405/download    Fact sheet for patients: https://www.fda.gov/media/868448/download    Test performed by PCR.    CBC & Differential [[id number]]  (Abnormal) Collected: 07/31/23 1301    Specimen: Blood Updated: 07/31/23 1400    Narrative:      The following orders were created for panel order CBC & Differential.  Procedure                               Abnormality         Status                     ---------                               -----------         ------                     CBC Auto Differential[[id number]]        Abnormal            Final result               Scan Slide[[id number]]                                       Final result                 Please view results for these tests on the individual orders.    Scan Slide [[id number]] Collected: 07/31/23 1301    Specimen: Blood Updated: 07/31/23 1400     RBC Morphology Normal     WBC Morphology Normal     Platelet Estimate Adequate    BNP [[id number]]  (Abnormal) Collected: 07/31/23 1301    Specimen: Blood Updated: 07/31/23 1347     proBNP 5,601.0 pg/mL     Narrative:      Among patients with dyspnea, NT-proBNP is highly sensitive for the detection of acute congestive heart failure. In addition NT-proBNP of <300 pg/ml effectively rules out acute congestive heart failure with 99% negative predictive value.    Results may be falsely decreased if patient taking Biotin.      High Sensitivity Troponin T [[id number]]  (Abnormal) Collected: 07/31/23 1301    Specimen: Blood Updated: 07/31/23 1332     HS Troponin T 41 ng/L     Narrative:      High Sensitive Troponin T Reference Range:  <10.0 ng/L- Negative Female for AMI  <15.0 ng/L- Negative Male for AMI  >=10 - Abnormal Female indicating possible myocardial injury.  >=15 - Abnormal Male  indicating possible myocardial injury.   Clinicians would have to utilize clinical acumen, EKG, Troponin, and serial changes to determine if it is an Acute Myocardial Infarction or myocardial injury due to an underlying chronic condition.         Comprehensive Metabolic Panel [[id number]]  (Abnormal) Collected: 07/31/23 1301    Specimen: Blood Updated: 07/31/23 1330     Glucose 107 mg/dL      BUN 46 mg/dL      Creatinine 1.70 mg/dL      Sodium 136 mmol/L      Potassium 4.3 mmol/L      Chloride 99 mmol/L      CO2 25.8 mmol/L      Calcium 9.8 mg/dL      Total Protein 7.1 g/dL      Albumin 4.1 g/dL      ALT (SGPT) 42 U/L      AST (SGOT) 30 U/L      Alkaline Phosphatase 75 U/L      Total Bilirubin 0.6 mg/dL      Globulin 3.0 gm/dL      A/G Ratio 1.4 g/dL      BUN/Creatinine Ratio 27.1     Anion Gap 11.2 mmol/L      eGFR 28.5 mL/min/1.73     Narrative:      GFR Normal >60  Chronic Kidney Disease <60  Kidney Failure <15    The GFR formula is only valid for adults with stable renal function between ages 18 and 70.    CBC Auto Differential [[id number]]  (Abnormal) Collected: 07/31/23 1301    Specimen: Blood Updated: 07/31/23 1320     WBC 18.18 10*3/mm3      RBC 4.19 10*6/mm3      Hemoglobin 12.3 g/dL      Hematocrit 40.1 %      MCV 95.7 fL      MCH 29.4 pg      MCHC 30.7 g/dL      RDW 14.7 %      RDW-SD 51.4 fl      MPV 10.1 fL      Platelets 287 10*3/mm3      Neutrophil % 77.1 %      Lymphocyte % 10.5 %      Monocyte % 9.6 %      Eosinophil % 1.6 %      Basophil % 0.5 %      Immature Grans % 0.7 %      Neutrophils, Absolute 14.02 10*3/mm3      Lymphocytes, Absolute 1.91 10*3/mm3      Monocytes, Absolute 1.75 10*3/mm3      Eosinophils, Absolute 0.29 10*3/mm3      Basophils, Absolute 0.09 10*3/mm3      Immature Grans, Absolute 0.12 10*3/mm3             Imaging Results (Most Recent)       Procedure Component Value Units Date/Time    NM Lung Ventilation Perfusion [[id number]] Collected: 07/31/23 1950     Updated: 07/31/23 2057     Narrative:      VENTILATION PERFUSION LUNG SCAN, 7/31/2023     HISTORY:   89-year-old female presenting to the ED with dyspnea, chest pain, low  oxygen saturation, elevated D-dimer.     DOSE:   *  26.5 mCi technetium labeled DTPA inhaled in aerosol.  *  5.4 mCi technetium MAA intravenously.     COMPARISON:   Chest x-ray today. CTA chest, 7/7/2022.     FINDINGS:   Extensive diffuse multifocal ventilatory abnormality in a pattern most  typically seen with obstructive airways disease, correlate clinically.  Perfusion abnormalities are present in a similar distribution, although  less severe than the ventilation abnormality.     No unmatched perfusion abnormality is identified to suggest pulmonary  embolism. However, given the extent of ventilatory abnormality, the  examination should be considered indeterminate for the exclusion of  pulmonary embolism. A significant PE could be present and obscured by  diffuse ventilatory abnormality.       Impression:      1.  No unmatched perfusion defect is identified to suggest pulmonary  embolism.  2.  Extensive multifocal ventilatory abnormality with corresponding  perfusion defects. On this basis, the examination is considered  indeterminate for the exclusion of pulmonary embolism.  3.  Consider CTA of the chest using PE protocol if there is high  clinical suspicion for PE and if it would alter clinical management.        This report was finalized on 7/31/2023 8:55 PM by Dr. Chino Jacobson MD.       XR Chest 1 View [[id number]] Collected: 07/31/23 1235     Updated: 07/31/23 1339    Narrative:      AP PORTABLE CHEST, 7/31/2023     HISTORY:  Shortness of air and chest discomfort for 2 days     COMPARISON:  7/7/2022 chest CT and 12/15/2021 chest x-ray     TECHNIQUE:  AP portable chest x-ray.     FINDINGS:  Heart size normal. Sternal wires are present. Anterior left  hemidiaphragm is elevated which was seen on the CT scan as well. There  are no infiltrates.       Impression:       No active disease     This report was finalized on 7/31/2023 1:37 PM by Dr. Paras Mercedes MD.             reviewed    ECG/EMG Results (most recent)       Procedure Component Value Units Date/Time    ECG 12 Lead Chest Pain [[id number]] Collected: 07/31/23 1242     Updated: 07/31/23 1243     QT Interval 442 ms     Narrative:      HEART RATE= 63  bpm  RR Interval= 956  ms  HI Interval= 151  ms  P Horizontal Axis= -47  deg  P Front Axis= 70  deg  QRSD Interval= 99  ms  QT Interval= 442  ms  QRS Axis= 5  deg  T Wave Axis= 34  deg  - ABNORMAL ECG -  Sinus rhythm  Probable left atrial enlargement  Probable left ventricular hypertrophy  Nonspecific T abnormalities, lateral leads  Minimal ST elevation, lateral leads  Electronically Signed By:   Date and Time of Study: 2023-07-31 12:42:08          Reviewed and compared to 7/2022.  Precordial S waves appear deeper than prior.  No significant t-wave changes and no tracing changes particularly in the inferior leads.    Assessment & Plan     Hypertensive Urgency: I'm wonder if some of this may be pain driven.  A majority of her complaints to me are about her back pain.  Dr. Willis documents appropriate BP readings in his 7/24/23 office visit w/ an uptitrate change in her Lopressor dose.  Readings today are not consistent w/ her log.  She is having a nice response to the Cardene drip.  With her aortic sclerosis and diastolic dysfunction, I want to keep this pressure down.  I will also add a heating pad for tonight.  May need imaging done of her back at some point.  Dyspnea: Again, she really wasn't describing this to me.  V/Q was inderterminent, but she has a DVT history it appears.  What's concerning is her BNP bum from 511 in 7/2022 to 5,000+ today in addition to an elevated troponin.  Her JUDAH is mild, and this was also noted by Dr. Willis on 7/24.  With her frequent rest breaks, known inferior ischemia on stress test, and sclerotic aortic valve, could she be devloping systolic  dysfunction?  Cardiology was called from the ER and they will see in the morning.  I've ordered a repeat echo since her last was in 9/2022.  Will concentrate on pressure and HR tonight.  JUDAH on likely CKD IIIa: This is mild.  Her baseline appears to be around 1.1 and a chronic level of probably CKDIIIa.  Given her prolonged stay in the ER, I'm going to repeat her renal panel.  I have decreased her fluid rate.  Leukocytosis: CXR and UA are clear.  I have added a PCT to her blood in lab.  She just did complete a course of Prednisone.  No abx therapy warranted at this time.  CAD: As discussed previously.  EKG as described above.  Lipid panel also pending.  Low Back Pain: As discussed in the aforementioned.    I discussed the patient's findings and my recommendations with patient and staff.     Chilo Plascencia MD  07/31/23  23:11 EDT

## 2023-08-01 NOTE — PLAN OF CARE
Goal Outcome Evaluation:  Plan of Care Reviewed With: patient, spouse        Progress: improving  Outcome Evaluation: All VSS; cardene gtt stopped at 1130 per MD order; IV fluids discontinued and all oral meds given as ordered; f/c remains in place with good UOP; no BM this shift; pt is eating/drinking well; pt is able to ambulate to the bathroom w/o issues; pt was downgraded to med surg and is hoping to be able to go home tomorrow.

## 2023-08-01 NOTE — ED PROVIDER NOTES
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 04/16/2021    History of blood clots     Hx of radiation therapy     2001, left breast ca    Hyperlipidemia     Hyperlipidemia 04/16/2021    Hypertension     Hypertension 04/16/2021    S/P CABG (coronary artery bypass graft) 10/12/2022       No Known Allergies    Past Surgical History:   Procedure Laterality Date    BREAST BIOPSY      BREAST LUMPECTOMY Left 2011    CARDIAC SURGERY      CARPAL TUNNEL RELEASE      HYSTERECTOMY      TONSILLECTOMY         Family History   Problem Relation Age of Onset    Stroke Mother     Stroke Brother     Breast cancer Neg Hx        Social History     Socioeconomic History    Marital status:     Number of children: 4   Tobacco Use    Smoking status: Never    Smokeless tobacco: Never   Vaping Use    Vaping Use: Never used   Substance and Sexual Activity    Alcohol use: No     Comment: caffeine use    Drug use: No    Sexual activity: Defer           Objective   Physical Exam    Procedures           ED Course  ED Course as of 07/31/23 2237   Mon Jul 31, 2023   1515 15:10 care assumed from Dr. Hough. [TP]   5780 The patient gives me a history that she has been short of breath for 2 to 3 days but better now.  She gives me a history that she had fleeting sharp chest pain this morning.  On my exam the patient has an oxygen saturation of 94% on room air.  Her lungs are clear to auscultation.  She has a 4-5/6 harsh holosystolic murmur that is significantly louder than documented by Dr. Gonzalez last October.  Reviewing her records in [facility] she had a myocardial perfusion stress test 10/22/2022 that showed a moderately severe area of ischemia in the inferior and lateral wall.  She had an echocardiogram performed 9/2/2022 that had a  normal EF of 70%.  A calcified trileaflet aortic valve was noted.  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 [TP]   1546 My interpretation of the patient's EKG tracing performed 12: 42 while the patient was under Dr. Hough's care is normal sinus rhythm with a rate of 63, normal axis, left ventricular hypertrophy by voltage, no acute ST segment elevation or depression consistent with ischemia, normal R wave transition, normal LA and QT intervals.  The EKG is not significantly changed in comparison to tracing earlier today at 11: 01. [TP]   1547 The patient's chest x-ray was interpreted by me and the radiologist as no acute disease. [TP]   1548 Review the patient's laboratory studies: The patient's CBC had an elevated white count of 18.18 with a left shift.  Hemoglobin, hematocrit and platelets within normal limits.  High sensitive troponin was not 41 and delta high-sensitivity troponin was 39 elevated in the indeterminate range.  No old values are available for comparison.  The patient's proBNP is elevated at 5601.  Again no old values are available for comparison.  CMP has an elevated creatinine of 1.7 with an elevated BUN of 46 and a low GFR 28.5 which is a significant change from a year ago when her BUN was 22 and creatinine 1.18.  Electrolytes and liver enzymes are essentially within normal limits.  The patient's COVID and flu PCR's are negative. [TP]   1549 The patient was noted to have markedly elevated blood pressure on arrival 269/129 for which Dr. Hough administered clonidine and hydralazine and her blood pressure was 127/80 on my assumption of care. [TP]   1622 Patient's urinalysis has trace leukocyte esterases and 3-5 WBCs which is borderline for a UTI. [TP]   1622 The patient's D-dimer is elevated at 6.  She gives me history of previous DVTs.  Her renal function is too low to support a CT angiogram of the chest.  A VQ scan was ordered. [TP]   1622 IV fluids for the patient's acute  kidney injury were initiated with saline 250 cc/h.     [TP]   2208 The patient received multiple doses of hydralazine to keep her blood pressure under control. [TP]   2208 21:20 patient discussed with Dr. Reyna, cardiologist on-call, who recommended the patient be admitted preferably to [facility] should interventions be needed, but if the patient was not agreeable then to this facility and they can see the patient in consult. [TP]   2209 I discussed with the patient and her 2 sons Dr. Reyna's recommendations for admission to [facility], however they decided they would prefer that the patient be admitted here.  They stated they would discuss with the cardiologist whether or not she needs more invasive interventions such as cardiac catheterization. [TP]   2209 The patient will be need to be admitted to address her acute kidney injury, accelerated hypertension and further evaluation of her indeterminate VQ scan. [TP]   2210 22: 06 patient discussed with Dr. Plascencia, hospitalist, who agreed to admit the patient.  He recommended the patient be placed on a Cardene drip. [TP]      ED Course User Index  [TP] Markos Lu MD                                           Medical Decision Making  My differential diagnosis for chest pain includes but is not limited to:  Muscle strain, costochondritis, myositis, pleurisy, rib fracture, intercostal neuritis, herpes zoster, tumor, myocardial infarction, coronary syndrome, unstable angina, angina, aortic dissection, mitral valve prolapse, pericarditis, palpitations, pulmonary embolus, pneumonia, pneumothorax, lung cancer, GERD, esophagitis, esophageal spasm    My differential diagnosis for dyspnea includes but is not limited to:  Asthma, COPD, pneumonia, pulmonary embolus, acute respiratory distress syndrome, pneumothorax, pleural effusion, pulmonary fibrosis, congestive heart failure, myocardial infarction, DKA, uremia, acidosis, sepsis, anemia, drug related,  hyperventilation, CNS disease     Problems Addressed:  Accelerated hypertension: complicated acute illness or injury  Acute kidney injury: complicated acute illness or injury  Chest pain, unspecified type: complicated acute illness or injury  Elevated d-dimer: complicated acute illness or injury  Heart murmur, systolic: complicated acute illness or injury  Shortness of breath: complicated acute illness or injury    Amount and/or Complexity of Data Reviewed  Labs: ordered. Decision-making details documented in ED Course.  Radiology: ordered. Decision-making details documented in ED Course.  ECG/medicine tests: ordered and independent interpretation performed. Decision-making details documented in ED Course.  Discussion of management or test interpretation with external provider(s): Details documented in the ED course.    Risk  Prescription drug management.  Decision regarding hospitalization.        Labs Reviewed   COMPREHENSIVE METABOLIC PANEL - Abnormal; Notable for the following components:       Result Value    Glucose 107 (*)     BUN 46 (*)     Creatinine 1.70 (*)     ALT (SGPT) 42 (*)     BUN/Creatinine Ratio 27.1 (*)     eGFR 28.5 (*)     All other components within normal limits    Narrative:     GFR Normal >60  Chronic Kidney Disease <60  Kidney Failure <15    The GFR formula is only valid for adults with stable renal function between ages 18 and 70.   BNP (IN-HOUSE) - Abnormal; Notable for the following components:    proBNP 5,601.0 (*)     All other components within normal limits    Narrative:     Among patients with dyspnea, NT-proBNP is highly sensitive for the detection of acute congestive heart failure. In addition NT-proBNP of <300 pg/ml effectively rules out acute congestive heart failure with 99% negative predictive value.    Results may be falsely decreased if patient taking Biotin.     TROPONIN - Abnormal; Notable for the following components:    HS Troponin T 41 (*)     All other components  within normal limits    Narrative:     High Sensitive Troponin T Reference Range:  <10.0 ng/L- Negative Female for AMI  <15.0 ng/L- Negative Male for AMI  >=10 - Abnormal Female indicating possible myocardial injury.  >=15 - Abnormal Male indicating possible myocardial injury.   Clinicians would have to utilize clinical acumen, EKG, Troponin, and serial changes to determine if it is an Acute Myocardial Infarction or myocardial injury due to an underlying chronic condition.        CBC WITH AUTO DIFFERENTIAL - Abnormal; Notable for the following components:    WBC 18.18 (*)     MCHC 30.7 (*)     Neutrophil % 77.1 (*)     Lymphocyte % 10.5 (*)     Immature Grans % 0.7 (*)     Neutrophils, Absolute 14.02 (*)     Monocytes, Absolute 1.75 (*)     Immature Grans, Absolute 0.12 (*)     All other components within normal limits   HIGH SENSITIVITIY TROPONIN T 2HR - Abnormal; Notable for the following components:    HS Troponin T 39 (*)     All other components within normal limits    Narrative:     High Sensitive Troponin T Reference Range:  <10.0 ng/L- Negative Female for AMI  <15.0 ng/L- Negative Male for AMI  >=10 - Abnormal Female indicating possible myocardial injury.  >=15 - Abnormal Male indicating possible myocardial injury.   Clinicians would have to utilize clinical acumen, EKG, Troponin, and serial changes to determine if it is an Acute Myocardial Infarction or myocardial injury due to an underlying chronic condition.        URINALYSIS W/ MICROSCOPIC IF INDICATED (NO CULTURE) - Abnormal; Notable for the following components:    Appearance, UA Slightly Cloudy (*)     Protein, UA 30 mg/dL (1+) (*)     Leuk Esterase, UA Trace (*)     All other components within normal limits   D-DIMER, QUANTITATIVE - Abnormal; Notable for the following components:    D-Dimer, Quantitative 6.09 (*)     All other components within normal limits    Narrative:     According to the assay 's published package insert, a normal  "(<0.50 MCGFEU/mL) D-dimer result in conjunction with a non-high clinical probability assessment, excludes deep vein thrombosis (DVT) and pulmonary embolism (PE) with high sensitivity.    D-dimer values increase with age and this can make VTE exclusion of an older population difficult. To address this, the [employer], based on best available evidence and recent guidelines, recommends that clinicians use age-adjusted D-dimer thresholds in patients greater than 50 years of age with: a) a low probability of PE who do not meet all Pulmonary Embolism Rule Out Criteria, or b) in those with intermediate probability of PE.   The formula for an age-adjusted D-dimer cut-off is \"age/100\".  For example, a 60 year old patient would have an age-adjusted cut-off of 0.60 MCGFEU/mL and an 80 year old 0.80 MCGFEU/mL.   URINALYSIS, MICROSCOPIC ONLY - Abnormal; Notable for the following components:    RBC, UA 0-2 (*)     WBC, UA 3-5 (*)     Squamous Epithelial Cells, UA 3-6 (*)     All other components within normal limits   COVID-19 AND FLU A/B, NP SWAB IN TRANSPORT MEDIA 8-12 HR TAT - Normal    Narrative:     Fact sheet for providers: https://www.fda.gov/media/520665/download    Fact sheet for patients: https://www.fda.gov/media/474365/download    Test performed by PCR.   COVID PRE-OP / PRE-PROCEDURE SCREENING ORDER (NO ISOLATION)    Narrative:     The following orders were created for panel order COVID PRE-OP / PRE-PROCEDURE SCREENING ORDER (NO ISOLATION) - Swab, Nasopharynx.  Procedure                               Abnormality         Status                     ---------                               -----------         ------                     COVID-19 and FLU A/B PCR...[[id number]]  Normal              Final result                 Please view results for these tests on the individual orders.   SCAN SLIDE   RENAL FUNCTION PANEL   CBC AND DIFFERENTIAL    Narrative:     The following orders were created for panel " order CBC & Differential.  Procedure                               Abnormality         Status                     ---------                               -----------         ------                     CBC Auto Differential[[id number]]        Abnormal            Final result               Scan Slide[[id number]]                                       Final result                 Please view results for these tests on the individual orders.   Reese dickerson       Medication List      No changes were made to your prescriptions during this visit.           Final diagnoses:   Chest pain, unspecified type   Accelerated hypertension   Acute kidney injury   Elevated d-dimer   Heart murmur, systolic   Shortness of breath       ED Disposition  ED Disposition       ED Disposition   Decision to Admit    Condition   --    Comment   Level of Care: Critical Care [6]   Diagnosis: Chest pain [359003]   Admitting Physician: LISA WATTS [[id number]]   Bed Request Comments: Chest pain, shortness of breath, accelerated hypertension, acute kidney injury, abnormal VQ scan, worsening heart murmur   Certification: I Certify That Inpatient Hospital Services Are Medically Necessary For Greater Than 2 Midnights                 No follow-up provider specified.       Medication List      No changes were made to your prescriptions during this visit.            Markos Lu MD  07/31/23 4207

## 2023-08-01 NOTE — CONSULTS
Date of Hospital Visit: 23  Encounter Provider: Handy Chen MD  Place of Service: [facility] CARDIOLOGY  Patient Name: Antonette King  :1934  Referral Provider: Dr Rae    Chief complaint: shortness of breath     History of Present Illness     Ms Antonette King is a 89 year old woman who recently established care with Dr Gonzalez.    She has a history of breast cancer (no details available, but she did receive chest radiation), CAD s/p CABG ~, HTN, hyperlipidemia, and mild dementia.     She was seen in the office in 2022.  She reported shortness of breath and was noted to have a murmur.  An echocardiogram revealed normal left ventricular systolic function and aortic sclerosis.  A perfusion stress test performed, and by my independent read, showed a small amount of inferolateral ischemia which was mild.  She and her family and Dr. Gonzalez wanted to proceed conservatively, and she was started on isosorbide.    She has actually been doing well.  On , she was started on oral prednisone.  Over the last 2 to 3 days, she has had increasing shortness of breath, orthopnea, puffiness, and some chest tightness.  She was seen yesterday and noted to be profoundly hypertensive and was sent to the emergency department.  Her blood pressure was 234/111 mmHg.  She has been given numerous IV medications and is doing much better.  She reports good urine output, but she does have a catheter in place.  She has not actually been given any diuretics here.    An echo today shows normal left ventricular systolic function with significant LVH, grade 2 diastolic dysfunction, and mild-moderate aortic stenosis.    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 Authorizing Provider   allopurinol (ZYLOPRIM) 300 MG tablet Take 1 tablet by mouth Daily. 12/7/16  Yes Maxi Dill MD   ascorbic acid (VITAMIN C) 500 MG tablet Take 1 tablet by mouth Daily.   Yes Maxi Dill MD   aspirin 81 MG chewable tablet Chew 1 tablet Daily.   Yes Maxi Dill MD   busPIRone (BUSPAR) 10 MG tablet Take 1 tablet by mouth 3 (Three) Times a Day.   Yes Maxi Dill MD   clopidogrel (PLAVIX) 75 MG tablet Take 1 tablet by mouth Daily. 12/7/16  Yes Maxi Dill MD   hydrALAZINE (APRESOLINE) 25 MG tablet Take 1 tablet by mouth Every 8 (Eight) Hours. 4/16/21  Yes Poli Alejandro MD   isosorbide mononitrate (IMDUR) 30 MG 24 hr tablet TAKE 1 TABLET BY MOUTH EVERY DAY 4/21/23  Yes Juan Jose Gonzalez III, MD   losartan-hydrochlorothiazide (HYZAAR) 100-12.5 MG per tablet Take 1 tablet by mouth Daily. 9/8/22  Yes Maxi Dill MD   metoprolol tartrate (LOPRESSOR) 50 MG tablet Take 1 tablet by mouth 2 (Two) Times a Day. 12/7/16  Yes Maxi Dill MD   multivitamin with minerals tablet tablet Take 1 tablet by mouth Daily.   Yes Maxi Dill MD   pravastatin (PRAVACHOL) 40 MG tablet Take 1 tablet by mouth Every Night. 12/7/16  Yes Maxi Dill MD   venlafaxine XR (EFFEXOR-XR) 75 MG 24 hr capsule Take 1 capsule by mouth Daily. 12/7/16  Yes Maxi Dill MD   vitamin D3 125 MCG (5000 UT) capsule capsule Take 1 capsule by mouth Daily.   Yes Maxi Dill MD       Social History  "    Socioeconomic History    Marital status:     Number of children: 4   Tobacco Use    Smoking status: Former     Types: Cigarettes    Smokeless tobacco: Never   Vaping Use    Vaping Use: Never used   Substance and Sexual Activity    Alcohol use: No     Comment: caffeine use    Drug use: No    Sexual activity: Defer       Family History   Problem Relation Age of Onset    Stroke Mother     Stroke Brother     Breast cancer Neg Hx        Review of Systems   Constitutional:  Positive for fatigue.   Respiratory:  Positive for chest tightness, shortness of breath and wheezing.    Psychiatric/Behavioral:  Positive for confusion.    All other systems reviewed and are negative.     Objective:     Vitals:    08/01/23 1045 08/01/23 1100 08/01/23 1125 08/01/23 1130   BP: 149/97 154/96  143/91   BP Location: Left arm Left arm  Left arm   Patient Position: Lying Lying  Lying   Pulse: 68 69  70   Resp: 16 16  16   Temp:   99.2 øF (37.3 øC)    TempSrc:   Axillary    SpO2: 93% 91%  92%   Weight:       Height:         Body mass index is 30.61 kg/mý.  Flowsheet Rows      Flowsheet Row First Filed Value   Admission Height 162.6 cm (64\") Documented at 07/31/2023 1236   Admission Weight 88.2 kg (194 lb 8 oz) Documented at 07/31/2023 1236            Physical Exam  Vitals reviewed.   Constitutional:       Appearance: She is well-developed.      Comments: Lying flat, no acute distress, + anxious   HENT:      Head: Normocephalic.      Nose: Nose normal.      Mouth/Throat:      Pharynx: Oropharynx is clear.   Eyes:      Conjunctiva/sclera: Conjunctivae normal.   Neck:      Vascular: No JVD.   Cardiovascular:      Rate and Rhythm: Normal rate and regular rhythm.      Pulses: Normal pulses.      Heart sounds: Normal heart sounds.    with a grade of 3/6.      Comments: Trace edema  Pulmonary:      Effort: Pulmonary effort is normal.      Breath sounds: Normal breath sounds.   Abdominal:      Palpations: Abdomen is soft. There is no mass. "      Tenderness: There is no abdominal tenderness.   Musculoskeletal:         General: Normal range of motion.      Cervical back: Normal range of motion.   Skin:     General: Skin is warm and dry.      Coloration: Skin is pale.      Findings: No erythema.   Neurological:      Mental Status: She is alert. Mental status is at baseline.      Cranial Nerves: No cranial nerve deficit.   Psychiatric:         Mood and Affect: Mood is anxious.           Lab Review:                Results from last 7 days   Lab Units 08/01/23  0519   SODIUM mmol/L 136   POTASSIUM mmol/L 3.7   CHLORIDE mmol/L 100   CO2 mmol/L 25.8   BUN mg/dL 34*   CREATININE mg/dL 1.29*   GLUCOSE mg/dL 114*   CALCIUM mg/dL 9.0     Results from last 7 days   Lab Units 07/31/23  1504 07/31/23  1301   HSTROP T ng/L 39* 41*     Results from last 7 days   Lab Units 08/01/23  0519   WBC 10*3/mm3 15.85*   HEMOGLOBIN g/dL 10.7*   HEMATOCRIT % 33.5*   PLATELETS 10*3/mm3 241         Results from last 7 days   Lab Units 07/31/23  1301   CHOLESTEROL mg/dL 221*         Results from last 7 days   Lab Units 07/31/23  1301   CHOLESTEROL mg/dL 221*   TRIGLYCERIDES mg/dL 115   HDL CHOL mg/dL 77*   LDL CHOL mg/dL 124*                       I personally viewed and interpreted the patient's EKG/Telemetry data    Assessment/Plan:     Acute HFpEF  Hypertensive urgency, due to volume overload from steroids  Diastolic dysfunction, grade 2  Mild-moderate AS  Chest discomfort, likely due to elevated LVEDP  CAD/CABG -- stress in October showed a small amount of mild inferolateral ischemia by my read, which is a low risk result  JUDAH on CKD3    It seems as though she was doing well until she was placed on steroids.  I think this is all directly due to that.  I would give her a dose of furosemide today and continue her home doses of metoprolol succinate and losartan-HCTZ.  I am going to increase isosorbide to 30 mg daily and change hydralazine to twice daily dosing as it is easier.   Ideally, I would like to max out the other medications and get rid of hydralazine completely.  We could consider adding a calcium channel blocker, as well, although I worry about causing edema.    Her troponin is minimally elevated and is downtrending.  Her EKG is nonischemic.  I do not feel that she needs further ischemic testing.  Also, she and her family would very much like to avoid transferring anywhere else, or doing anything invasive, and I think this is all extremely reasonable.    She will remain on clopidogrel and aspirin as she was on both of these medications at home.  I will defer to her usual cardiologist, Dr. Gonzalez, as to whether or not she requires dual antiplatelet therapy indefinitely given her advanced age.  I do feel she should be switched from pravastatin to atorvastatin and I have made that change today.  I think she can eat and be moved out of the ICU.  She may be able to go home as soon as tomorrow.    D/W Dr Rae.

## 2023-08-01 NOTE — CASE MANAGEMENT/SOCIAL WORK
Discharge Planning Assessment  KAVITA Caldwell     Patient Name: Antonette King  MRN: [id number]  Today's Date: 8/1/2023    Admit Date: 7/31/2023    Plan: Home with    Discharge Needs Assessment       Row Name 08/01/23 1308       Living Environment    People in Home spouse    Name(s) of People in Home cecille Escalona    Current Living Arrangements condominium    Duration at Residence 3 Y    Potentially Unsafe Housing Conditions none    Primary Care Provided by self    Provides Primary Care For no one    Caregiving Concerns no care giving concerns voiced by patient at this time.    Family Caregiver if Needed spouse    Family Caregiver Names Pola, cecille    Quality of Family Relationships helpful;involved;supportive    Able to Return to Prior Arrangements yes    Living Arrangement Comments Patient states she lives with her  in a single story house with no steps to gain entry       Resource/Environmental Concerns    Resource/Environmental Concerns none    Transportation Concerns none       Food Insecurity    Within the past 12 months, you worried that your food would run out before you got the money to buy more. Never true    Within the past 12 months, the food you bought just didn't last and you didn't have money to get more. Never true       Transition Planning    Patient/Family Anticipates Transition to home with family    Patient/Family Anticipated Services at Transition none    Transportation Anticipated family or friend will provide   will be able to provide ride home at discharge       Discharge Needs Assessment    Readmission Within the Last 30 Days no previous admission in last 30 days    Current Outpatient/Agency/Support Group --  none    Equipment Currently Used at Home other (see comments);bp cuff;pulse ox    Concerns to be Addressed denies needs/concerns at this time;adjustment to diagnosis/illness;discharge planning    Concerns Comments pt voiced no discharge needs at this time.     Anticipated Changes Related to Illness none    Equipment Needed After Discharge none    Outpatient/Agency/Support Group Needs --  pt states she will not need these services at discharge    Discharge Facility/Level of Care Needs --  pt states she will not need these services at discharge    Provided Post Acute Provider List? Refused    Refused Provider List Comment pt declined    Patient's Choice of Community Agency(s) none    Current Discharge Risk --  none    Discharge Coordination/Progress Patient states she plans on returning home at discharge with her  to help as needed and voiced no discharge needs at this time.                   Discharge Plan       Row Name 08/01/23 3762       Plan    Plan Home with     Patient/Family in Agreement with Plan yes    Plan Comments Into room and introduced self and role of CM. Discussed discharge disposition with patient, daughter and  Pola with permission. Patient is currently sitting up in the bed eating and donut and voiced no complaints. Patient is [place] but with the help of her  answers all questions appropriately. Patient confirms that the info on her face sheet is correct and that she see's Dr. Tonny Willis as PCP. She states she uses [facility] in pharmacy for her maintenance med's and uses [facility] in [place] for one time scripts and states she currently has no problem picking up or paying for her med's. She also states she does not have a living will and declines information regarding one. Patient states [facility] with no steps to gain entry and normally has no issues entering the home or maneuvering inside. She states she is independent with her ADL's and drives occasionally and  her  will be able to provide ride home at discharge. She also states she has a pulse ox and BP cuff at home and does not anticipate needing any other equipment at discharge. Patient states she has used home health in the  past but is unsure which agency it was and states she will not need this service at discharge and declines the need for other services such as STR or LTC at this time. Patient states she plans on returning home at discharge with her  and adult children to help if needed and voiced no discharge needs at this time. She had no other questions or concerns regarding discharge plans. Name and number placed on white board in room. CM will follow.                  Continued Care and Services - Admitted Since 7/31/2023    Coordination has not been started for this encounter.          Demographic Summary       Row Name 08/01/23 9122       General Information    Admission Type inpatient    Arrived From home    Required Notices Provided Important Message from Medicare    Referral Source admission list    Reason for Consult discharge planning    Preferred Language English       Contact Information    Permission Granted to Share Info With                    Functional Status    No documentation.                  Psychosocial    No documentation.                  Abuse/Neglect    No documentation.                  Legal    No documentation.                  Substance Abuse    No documentation.                  Patient Forms    No documentation.                     Jennifer Tom RN

## 2023-08-01 NOTE — PLAN OF CARE
Goal Outcome Evaluation:  Plan of Care Reviewed With: patient        Progress: improving  Outcome Evaluation: Tena catheter in place with good urine output. Maintaining SBP<160 on 2.5mg cardene gtt. Back pain improved with heating pad.

## 2023-08-01 NOTE — PROGRESS NOTES
"SERVICE: [facility] HOSPITALIST    CONSULTANTS: Cardiology    CHIEF COMPLAINT: Shortness of breath    SUBJECTIVE: Patient seen and examined at bedside. Patient reports she is feeling better this.  She denies shortness of breath wheezing or coughing.  She denies chest pain.  No other acute issues or complaints.     OBJECTIVE:    Physical exam is largely unchanged from previous exam, except where documented below, examination is accurate as of 8/1/2023    Physical Exam:  General: Patient is awake and alert.  Elderly female. No acute distress noted.   HENT: Head is atraumatic, normocephalic. Hearing is grossly intact. Nose is without obvious congestion and appears patent. Neck is supple and trachea is midline.   Eyes: Vision is grossly intact. Pupils appear equal and round.   Cardiovascular: Heart has regular rate and rhythm with obvious systolic murmur diffusely.   Respiratory: Lungs are clear to ausculation without wheezes, rhonchi or rales.   Abdominal/GI: Soft, nontender, bowel sounds present. No rebound or guarding present.   Extremities: No peripheral edema noted.   Musculoskeletal: Spontaneous movement of bilateral upper and lower extremities against gravity noted. No signs of injury or deformity noted.   Skin: Warm and dry.   Psych: Mood and affect are appropriate. Cooperative with exam.   Neuro: No facial asymmetry noted. No focal deficits noted, hearing and vision are grossly intact.     /87   Pulse 80   Temp 99.8 øF (37.7 øC) (Oral)   Resp 16   Ht 162.6 cm (64\")   Wt 80.9 kg (178 lb 5.6 oz)   SpO2 91%   BMI 30.61 kg/mý     MEDS/LABS REVIEWED AND ORDERED    ascorbic acid, 500 mg, Oral, Daily  busPIRone, 10 mg, Oral, TID  enoxaparin, 30 mg, Subcutaneous, Q24H  metoprolol tartrate, 100 mg, Oral, Q12H  senna-docusate sodium, 2 tablet, Oral, BID  sodium chloride, 10 mL, Intravenous, Q12H          LAB/DIAGNOSTICS:    Lab Results (last 24 hours)       Procedure Component Value Units " Date/Time    Basic Metabolic Panel [[id number]]  (Abnormal) Collected: 08/01/23 0519    Specimen: Blood from Arm, Left Updated: 08/01/23 0543     Glucose 114 mg/dL      BUN 34 mg/dL      Creatinine 1.29 mg/dL      Sodium 136 mmol/L      Potassium 3.7 mmol/L      Chloride 100 mmol/L      CO2 25.8 mmol/L      Calcium 9.0 mg/dL      BUN/Creatinine Ratio 26.4     Anion Gap 10.2 mmol/L      eGFR 39.8 mL/min/1.73     Narrative:      GFR Normal >60  Chronic Kidney Disease <60  Kidney Failure <15    The GFR formula is only valid for adults with stable renal function between ages 18 and 70.    CBC & Differential [[id number]]  (Abnormal) Collected: 08/01/23 0519    Specimen: Blood from Arm, Left Updated: 08/01/23 0526    Narrative:      The following orders were created for panel order CBC & Differential.  Procedure                               Abnormality         Status                     ---------                               -----------         ------                     CBC Auto Differential[[id number]]        Abnormal            Final result                 Please view results for these tests on the individual orders.    CBC Auto Differential [[id number]]  (Abnormal) Collected: 08/01/23 0519    Specimen: Blood from Arm, Left Updated: 08/01/23 0526     WBC 15.85 10*3/mm3      RBC 3.61 10*6/mm3      Hemoglobin 10.7 g/dL      Hematocrit 33.5 %      MCV 92.8 fL      MCH 29.6 pg      MCHC 31.9 g/dL      RDW 14.9 %      RDW-SD 49.4 fl      MPV 10.3 fL      Platelets 241 10*3/mm3      Neutrophil % 82.0 %      Lymphocyte % 7.9 %      Monocyte % 8.4 %      Eosinophil % 0.6 %      Basophil % 0.3 %      Immature Grans % 0.8 %      Neutrophils, Absolute 12.98 10*3/mm3      Lymphocytes, Absolute 1.26 10*3/mm3      Monocytes, Absolute 1.33 10*3/mm3      Eosinophils, Absolute 0.10 10*3/mm3      Basophils, Absolute 0.05 10*3/mm3      Immature Grans, Absolute 0.13 10*3/mm3      nRBC 0.0 /100 WBC     Renal Function Panel [[id number]]   "(Abnormal) Collected: 08/01/23 0107    Specimen: Blood Updated: 08/01/23 0134     Glucose 114 mg/dL      BUN 38 mg/dL      Creatinine 1.42 mg/dL      Sodium 132 mmol/L      Potassium 4.8 mmol/L      Comment: Specimen hemolyzed.  Results may be affected.        Chloride 98 mmol/L      CO2 22.7 mmol/L      Calcium 9.1 mg/dL      Albumin 3.4 g/dL      Phosphorus 2.9 mg/dL      Anion Gap 11.3 mmol/L      BUN/Creatinine Ratio 26.8     eGFR 35.4 mL/min/1.73     Narrative:      GFR Normal >60  Chronic Kidney Disease <60  Kidney Failure <15    The GFR formula is only valid for adults with stable renal function between ages 18 and 70.    TSH [[id number]]  (Normal) Collected: 07/31/23 1301    Specimen: Blood Updated: 07/31/23 2350     TSH 3.060 uIU/mL     Procalcitonin [[id number]]  (Normal) Collected: 07/31/23 1504    Specimen: Blood Updated: 07/31/23 2335     Procalcitonin 0.07 ng/mL     Narrative:      As a Marker for Sepsis (Non-Neonates):    1. <0.5 ng/mL represents a low risk of severe sepsis and/or septic shock.  2. >2 ng/mL represents a high risk of severe sepsis and/or septic shock.    As a Marker for Lower Respiratory Tract Infections that require antibiotic therapy:    PCT on Admission    Antibiotic Therapy       6-12 Hrs later    >0.5                Strongly Recommended  >0.25 - <0.5        Recommended   0.1 - 0.25          Discouraged              Remeasure/reassess PCT  <0.1                Strongly Discouraged     Remeasure/reassess PCT    As 28 day mortality risk marker: \"Change in Procalcitonin Result\" (>80% or <=80%) if Day 0 (or Day 1) and Day 4 values are available. Refer to http://www.[facility]-pct-calculator.com    Change in PCT <=80%  A decrease of PCT levels below or equal to 80% defines a positive change in PCT test result representing a higher risk for 28-day all-cause mortality of patients diagnosed with severe sepsis for septic shock.    Change in PCT >80%  A decrease of PCT levels of more than 80% " defines a negative change in PCT result representing a lower risk for 28-day all-cause mortality of patients diagnosed with severe sepsis or septic shock.       Lipid Panel [[id number]]  (Abnormal) Collected: 07/31/23 1301    Specimen: Blood Updated: 07/31/23 2330     Total Cholesterol 221 mg/dL      Triglycerides 115 mg/dL      HDL Cholesterol 77 mg/dL      LDL Cholesterol  124 mg/dL      VLDL Cholesterol 20 mg/dL      LDL/HDL Ratio 1.57    Narrative:      Cholesterol Reference Ranges  (U.S. Department of Health and Human Services ATP III Classifications)    Desirable          <200 mg/dL  Borderline High    200-239 mg/dL  High Risk          >240 mg/dL      Triglyceride Reference Ranges  (U.S. Department of Health and Human Services ATP III Classifications)    Normal           <150 mg/dL  Borderline High  150-199 mg/dL  High             200-499 mg/dL  Very High        >500 mg/dL    HDL Reference Ranges  (U.S. Department of Health and Human Services ATP III Classifications)    Low     <40 mg/dl (major risk factor for CHD)  High    >60 mg/dl ('negative' risk factor for CHD)        LDL Reference Ranges  (U.S. Department of Health and Human Services ATP III Classifications)    Optimal          <100 mg/dL  Near Optimal     100-129 mg/dL  Borderline High  130-159 mg/dL  High             160-189 mg/dL  Very High        >189 mg/dL    D-dimer, Quantitative [[id number]]  (Abnormal) Collected: 07/31/23 1301    Specimen: Blood Updated: 07/31/23 1616     D-Dimer, Quantitative 6.09 MCGFEU/mL     Narrative:      According to the assay 's published package insert, a normal (<0.50 MCGFEU/mL) D-dimer result in conjunction with a non-high clinical probability assessment, excludes deep vein thrombosis (DVT) and pulmonary embolism (PE) with high sensitivity.    D-dimer values increase with age and this can make VTE exclusion of an older population difficult. To address this, the American College of [employer], based on best  "available evidence and recent guidelines, recommends that clinicians use age-adjusted D-dimer thresholds in patients greater than 50 years of age with: a) a low probability of PE who do not meet all Pulmonary Embolism Rule Out Criteria, or b) in those with intermediate probability of PE.   The formula for an age-adjusted D-dimer cut-off is \"age/100\".  For example, a 60 year old patient would have an age-adjusted cut-off of 0.60 MCGFEU/mL and an 80 year old 0.80 MCGFEU/mL.    Urinalysis, Microscopic Only - Urine, Clean Catch [[id number]]  (Abnormal) Collected: 07/31/23 1538    Specimen: Urine, Clean Catch Updated: 07/31/23 1600     RBC, UA 0-2 /HPF      WBC, UA 3-5 /HPF      Bacteria, UA None Seen /HPF      Squamous Epithelial Cells, UA 3-6 /HPF      Hyaline Casts, UA None Seen /LPF      Methodology Manual Light Microscopy    Urinalysis With Microscopic If Indicated (No Culture) - Urine, Clean Catch [[id number]]  (Abnormal) Collected: 07/31/23 1538    Specimen: Urine, Clean Catch Updated: 07/31/23 1600     Color, UA Yellow     Appearance, UA Slightly Cloudy     pH, UA 7.0     Specific Gravity, UA 1.020     Glucose, UA Negative     Ketones, UA Negative     Bilirubin, UA Negative     Blood, UA Negative     Protein, UA 30 mg/dL (1+)     Leuk Esterase, UA Trace     Nitrite, UA Negative     Urobilinogen, UA 0.2 E.U./dL    High Sensitivity Troponin T 2Hr [[id number]]  (Abnormal) Collected: 07/31/23 1504    Specimen: Blood Updated: 07/31/23 1536     HS Troponin T 39 ng/L      Troponin T Delta -2 ng/L     Narrative:      High Sensitive Troponin T Reference Range:  <10.0 ng/L- Negative Female for AMI  <15.0 ng/L- Negative Male for AMI  >=10 - Abnormal Female indicating possible myocardial injury.  >=15 - Abnormal Male indicating possible myocardial injury.   Clinicians would have to utilize clinical acumen, EKG, Troponin, and serial changes to determine if it is an Acute Myocardial Infarction or myocardial injury due to an " underlying chronic condition.         COVID PRE-OP / PRE-PROCEDURE SCREENING ORDER (NO ISOLATION) - Swab, Nasopharynx [[id number]]  (Normal) Collected: 07/31/23 1414    Specimen: Swab from Nasopharynx Updated: 07/31/23 0309    Narrative:      The following orders were created for panel order COVID PRE-OP / PRE-PROCEDURE SCREENING ORDER (NO ISOLATION) - Swab, Nasopharynx.  Procedure                               Abnormality         Status                     ---------                               -----------         ------                     COVID-19 and FLU A/B PCR...[[id number]]  Normal              Final result                 Please view results for these tests on the individual orders.    COVID-19 and FLU A/B PCR - Swab, Nasopharynx [[id number]]  (Normal) Collected: 07/31/23 1414    Specimen: Swab from Nasopharynx Updated: 07/31/23 7849     COVID19 Not Detected     Influenza A PCR Not Detected     Influenza B PCR Not Detected    Narrative:      Fact sheet for providers: https://www.fda.gov/media/450682/download    Fact sheet for patients: https://www.fda.gov/media/395924/download    Test performed by PCR.    CBC & Differential [[id number]]  (Abnormal) Collected: 07/31/23 1301    Specimen: Blood Updated: 07/31/23 1400    Narrative:      The following orders were created for panel order CBC & Differential.  Procedure                               Abnormality         Status                     ---------                               -----------         ------                     CBC Auto Differential[[id number]]        Abnormal            Final result               Scan Slide[[id number]]                                       Final result                 Please view results for these tests on the individual orders.    Scan Slide [[id number]] Collected: 07/31/23 1301    Specimen: Blood Updated: 07/31/23 1400     RBC Morphology Normal     WBC Morphology Normal     Platelet Estimate Adequate    BNP [[id number]]  (Abnormal)  Collected: 07/31/23 1301    Specimen: Blood Updated: 07/31/23 1347     proBNP 5,601.0 pg/mL     Narrative:      Among patients with dyspnea, NT-proBNP is highly sensitive for the detection of acute congestive heart failure. In addition NT-proBNP of <300 pg/ml effectively rules out acute congestive heart failure with 99% negative predictive value.    Results may be falsely decreased if patient taking Biotin.      High Sensitivity Troponin T [[id number]]  (Abnormal) Collected: 07/31/23 1301    Specimen: Blood Updated: 07/31/23 1332     HS Troponin T 41 ng/L     Narrative:      High Sensitive Troponin T Reference Range:  <10.0 ng/L- Negative Female for AMI  <15.0 ng/L- Negative Male for AMI  >=10 - Abnormal Female indicating possible myocardial injury.  >=15 - Abnormal Male indicating possible myocardial injury.   Clinicians would have to utilize clinical acumen, EKG, Troponin, and serial changes to determine if it is an Acute Myocardial Infarction or myocardial injury due to an underlying chronic condition.         Comprehensive Metabolic Panel [[id number]]  (Abnormal) Collected: 07/31/23 1301    Specimen: Blood Updated: 07/31/23 1330     Glucose 107 mg/dL      BUN 46 mg/dL      Creatinine 1.70 mg/dL      Sodium 136 mmol/L      Potassium 4.3 mmol/L      Chloride 99 mmol/L      CO2 25.8 mmol/L      Calcium 9.8 mg/dL      Total Protein 7.1 g/dL      Albumin 4.1 g/dL      ALT (SGPT) 42 U/L      AST (SGOT) 30 U/L      Alkaline Phosphatase 75 U/L      Total Bilirubin 0.6 mg/dL      Globulin 3.0 gm/dL      A/G Ratio 1.4 g/dL      BUN/Creatinine Ratio 27.1     Anion Gap 11.2 mmol/L      eGFR 28.5 mL/min/1.73     Narrative:      GFR Normal >60  Chronic Kidney Disease <60  Kidney Failure <15    The GFR formula is only valid for adults with stable renal function between ages 18 and 70.    CBC Auto Differential [[id number]]  (Abnormal) Collected: 07/31/23 1301    Specimen: Blood Updated: 07/31/23 1320     WBC 18.18 10*3/mm3       RBC 4.19 10*6/mm3      Hemoglobin 12.3 g/dL      Hematocrit 40.1 %      MCV 95.7 fL      MCH 29.4 pg      MCHC 30.7 g/dL      RDW 14.7 %      RDW-SD 51.4 fl      MPV 10.1 fL      Platelets 287 10*3/mm3      Neutrophil % 77.1 %      Lymphocyte % 10.5 %      Monocyte % 9.6 %      Eosinophil % 1.6 %      Basophil % 0.5 %      Immature Grans % 0.7 %      Neutrophils, Absolute 14.02 10*3/mm3      Lymphocytes, Absolute 1.91 10*3/mm3      Monocytes, Absolute 1.75 10*3/mm3      Eosinophils, Absolute 0.29 10*3/mm3      Basophils, Absolute 0.09 10*3/mm3      Immature Grans, Absolute 0.12 10*3/mm3           ECG 12 Lead Chest Pain   Preliminary Result   HEART RATE= 63  bpm   RR Interval= 956  ms   IN Interval= 151  ms   P Horizontal Axis= -47  deg   P Front Axis= 70  deg   QRSD Interval= 99  ms   QT Interval= 442  ms   QRS Axis= 5  deg   T Wave Axis= 34  deg   - ABNORMAL ECG -   Sinus rhythm   Probable left atrial enlargement   Probable left ventricular hypertrophy   Nonspecific T abnormalities, lateral leads   Minimal ST elevation, lateral leads   Electronically Signed By:    Date and Time of Study: 2023-07-31 12:42:08        Results for orders placed during the hospital encounter of 09/23/22    Adult Transthoracic Echo Complete w/ Color, Spectral and Contrast if necessary per protocol    Interpretation Summary  ú Left ventricular wall thickness is consistent with mild concentric hypertrophy.  ú Estimated left ventricular EF was in agreement with the calculated left ventricular EF. Left ventricular ejection fraction appears to be greater than 70%.  ú There is calcification of the aortic valve.  ú Left ventricular diastolic function is consistent with (grade I) impaired relaxation.    NM Lung Ventilation Perfusion    Result Date: 7/31/2023  1.  No unmatched perfusion defect is identified to suggest pulmonary embolism. 2.  Extensive multifocal ventilatory abnormality with corresponding perfusion defects. On this basis, the  examination is considered indeterminate for the exclusion of pulmonary embolism. 3.  Consider CTA of the chest using PE protocol if there is high clinical suspicion for PE and if it would alter clinical management.   This report was finalized on 7/31/2023 8:55 PM by Dr. Chino Jacobson MD.      XR Chest 1 View    Result Date: 7/31/2023  No active disease  This report was finalized on 7/31/2023 1:37 PM by Dr. Paras Mercedes MD.         ASSESSMENT/PLAN:  Please note portions of this assessment/plan may have been copied and pasted, but I have personally seen this patient and reviewed each line of this assessment and plan for accuracy and made updates to reflect my necessary changes on 8/1/2023    Hypertensive urgency  -Potentially related to pain from her back  -Cardene drip continues and patient has had good response  -Blood pressure medications have been increased  -Continue to monitor    Worsening heart murmur in the setting of dyspnea  -Echo pending  -VQ scan was indeterminant  -BNP significantly elevated, chest x-ray showed no pulmonary edema, suspect valvular pathology, patient has had recent abnormal stress test.   -Cardiology consulted    JUDAH on likely CKD 3A  -Continue IV fluids at reduced rate, monitor    Leukocytosis  -No other signs of infection, recent prednisone use, monitor    CAD  -Abnormal stress test as above, cardiology following    Low back pain-conservative management, may need repeat imaging in the near future      PLAN FOR DISPOSITION: IVON Rae DO  Hospitalist, [facility]  08/01/23  08:36 EDT    At [facility], we believe that sharing information builds trust and better relationships. You are receiving this note because you recently visited [facility]. It is possible you will see health information before a provider has talked with you about it. This kind of information can be easy to misunderstand. To help you fully understand what it means for your health, we  "urge you to discuss this note with your provider.    \"Dictated utilizing Dragon dictation\"    "

## 2023-08-01 NOTE — ED NOTES
Nursing report ED to floor  Antonette King  89 y.o.  female    HPI :   Chief Complaint   Patient presents with    Shortness of Breath    Chest Pain     SOA started this weekend, cp started today, given 1sl nitro by PCP       Admitting doctor:   Chilo Plascencia MD    Admitting diagnosis:   The primary encounter diagnosis was Chest pain, unspecified type. Diagnoses of Accelerated hypertension, Acute kidney injury, Elevated d-dimer, Heart murmur, systolic, and Shortness of breath were also pertinent to this visit.    Code status:   Current Code Status       Date Active Code Status Order ID Comments User Context       Prior            Allergies:   Patient has no known allergies.    Isolation:   Enhanced Droplet/Contact     Intake and Output  No intake or output data in the 24 hours ending 07/31/23 2234    Weight:       07/31/23  1236   Weight: 88.2 kg (194 lb 8 oz)       Most recent vitals:   Vitals:    07/31/23 2031 07/31/23 2101 07/31/23 2201 07/31/23 2231   BP: (!) 181/96 152/86 (!) 181/96 (!) 181/94   BP Location: Right arm Right arm Right arm Right arm   Patient Position: Lying Lying Lying Lying   Pulse: 68 66 70 70   Resp: 17 16 17 16   Temp:       TempSrc:       SpO2: 95% 93% 95% 95%   Weight:       Height:           Active LDAs/IV Access:   Lines, Drains & Airways       Active LDAs       Name Placement date Placement time Site Days    Peripheral IV 07/31/23 1243 Right Antecubital 07/31/23  1243  placed in PCP office  Antecubital  less than 1                    Labs (abnormal labs have a star):   Labs Reviewed   COMPREHENSIVE METABOLIC PANEL - Abnormal; Notable for the following components:       Result Value    Glucose 107 (*)     BUN 46 (*)     Creatinine 1.70 (*)     ALT (SGPT) 42 (*)     BUN/Creatinine Ratio 27.1 (*)     eGFR 28.5 (*)     All other components within normal limits    Narrative:     GFR Normal >60  Chronic Kidney Disease <60  Kidney Failure <15    The GFR formula is only valid for adults with  stable renal function between ages 18 and 70.   BNP (IN-HOUSE) - Abnormal; Notable for the following components:    proBNP 5,601.0 (*)     All other components within normal limits    Narrative:     Among patients with dyspnea, NT-proBNP is highly sensitive for the detection of acute congestive heart failure. In addition NT-proBNP of <300 pg/ml effectively rules out acute congestive heart failure with 99% negative predictive value.    Results may be falsely decreased if patient taking Biotin.     TROPONIN - Abnormal; Notable for the following components:    HS Troponin T 41 (*)     All other components within normal limits    Narrative:     High Sensitive Troponin T Reference Range:  <10.0 ng/L- Negative Female for AMI  <15.0 ng/L- Negative Male for AMI  >=10 - Abnormal Female indicating possible myocardial injury.  >=15 - Abnormal Male indicating possible myocardial injury.   Clinicians would have to utilize clinical acumen, EKG, Troponin, and serial changes to determine if it is an Acute Myocardial Infarction or myocardial injury due to an underlying chronic condition.        CBC WITH AUTO DIFFERENTIAL - Abnormal; Notable for the following components:    WBC 18.18 (*)     MCHC 30.7 (*)     Neutrophil % 77.1 (*)     Lymphocyte % 10.5 (*)     Immature Grans % 0.7 (*)     Neutrophils, Absolute 14.02 (*)     Monocytes, Absolute 1.75 (*)     Immature Grans, Absolute 0.12 (*)     All other components within normal limits   HIGH SENSITIVITIY TROPONIN T 2HR - Abnormal; Notable for the following components:    HS Troponin T 39 (*)     All other components within normal limits    Narrative:     High Sensitive Troponin T Reference Range:  <10.0 ng/L- Negative Female for AMI  <15.0 ng/L- Negative Male for AMI  >=10 - Abnormal Female indicating possible myocardial injury.  >=15 - Abnormal Male indicating possible myocardial injury.   Clinicians would have to utilize clinical acumen, EKG, Troponin, and serial changes to  "determine if it is an Acute Myocardial Infarction or myocardial injury due to an underlying chronic condition.        URINALYSIS W/ MICROSCOPIC IF INDICATED (NO CULTURE) - Abnormal; Notable for the following components:    Appearance, UA Slightly Cloudy (*)     Protein, UA 30 mg/dL (1+) (*)     Leuk Esterase, UA Trace (*)     All other components within normal limits   D-DIMER, QUANTITATIVE - Abnormal; Notable for the following components:    D-Dimer, Quantitative 6.09 (*)     All other components within normal limits    Narrative:     According to the assay 's published package insert, a normal (<0.50 MCGFEU/mL) D-dimer result in conjunction with a non-high clinical probability assessment, excludes deep vein thrombosis (DVT) and pulmonary embolism (PE) with high sensitivity.    D-dimer values increase with age and this can make VTE exclusion of an older population difficult. To address this, the [employer], based on best available evidence and recent guidelines, recommends that clinicians use age-adjusted D-dimer thresholds in patients greater than 50 years of age with: a) a low probability of PE who do not meet all Pulmonary Embolism Rule Out Criteria, or b) in those with intermediate probability of PE.   The formula for an age-adjusted D-dimer cut-off is \"age/100\".  For example, a 60 year old patient would have an age-adjusted cut-off of 0.60 MCGFEU/mL and an 80 year old 0.80 MCGFEU/mL.   URINALYSIS, MICROSCOPIC ONLY - Abnormal; Notable for the following components:    RBC, UA 0-2 (*)     WBC, UA 3-5 (*)     Squamous Epithelial Cells, UA 3-6 (*)     All other components within normal limits   COVID-19 AND FLU A/B, NP SWAB IN TRANSPORT MEDIA 8-12 HR TAT - Normal    Narrative:     Fact sheet for providers: https://www.fda.gov/media/783013/download    Fact sheet for patients: https://www.fda.gov/media/725238/download    Test performed by PCR.   COVID PRE-OP / PRE-PROCEDURE SCREENING " ORDER (NO ISOLATION)    Narrative:     The following orders were created for panel order COVID PRE-OP / PRE-PROCEDURE SCREENING ORDER (NO ISOLATION) - Swab, Nasopharynx.  Procedure                               Abnormality         Status                     ---------                               -----------         ------                     COVID-19 and FLU A/B PCR...[[id number]]  Normal              Final result                 Please view results for these tests on the individual orders.   SCAN SLIDE   RENAL FUNCTION PANEL   CBC AND DIFFERENTIAL    Narrative:     The following orders were created for panel order CBC & Differential.  Procedure                               Abnormality         Status                     ---------                               -----------         ------                     CBC Auto Differential[[id number]]        Abnormal            Final result               Scan Slide[[id number]]                                       Final result                 Please view results for these tests on the individual orders.       EKG:   ECG 12 Lead Chest Pain   Preliminary Result   HEART RATE= 63  bpm   RR Interval= 956  ms   NH Interval= 151  ms   P Horizontal Axis= -47  deg   P Front Axis= 70  deg   QRSD Interval= 99  ms   QT Interval= 442  ms   QRS Axis= 5  deg   T Wave Axis= 34  deg   - ABNORMAL ECG -   Sinus rhythm   Probable left atrial enlargement   Probable left ventricular hypertrophy   Nonspecific T abnormalities, lateral leads   Minimal ST elevation, lateral leads   Electronically Signed By:    Date and Time of Study: 2023-07-31 12:42:08          Meds given in ED:   Medications   nitroglycerin (NITROSTAT) SL tablet 0.4 mg (0.4 mg Sublingual Not Given 7/31/23 1349)   sodium chloride 0.9 % infusion (0 mL/hr Intravenous Stopped 7/31/23 2028)   niCARdipine (CARDENE) 20 mg in 200 mL NS infusion (5 mg/hr Intravenous New Bag 7/31/23 2228)   cloNIDine (CATAPRES) tablet 0.2 mg (0.2 mg Oral Given  7/31/23 1301)   hydrALAZINE (APRESOLINE) injection 10 mg (10 mg Intravenous Given 7/31/23 1300)   hydrALAZINE (APRESOLINE) injection 10 mg (10 mg Intravenous Given 7/31/23 1737)   technetium Tc 99m pentetate injection inhaler 1 dose (1 dose Inhalation Given 7/31/23 1925)   technetium albumin aggregated (MAA) solution 1 dose (1 dose Intravenous Given 7/31/23 1941)   hydrALAZINE (APRESOLINE) injection 10 mg (10 mg Intravenous Given 7/31/23 2011)       Imaging results:  NM Lung Ventilation Perfusion    Result Date: 7/31/2023  1.  No unmatched perfusion defect is identified to suggest pulmonary embolism. 2.  Extensive multifocal ventilatory abnormality with corresponding perfusion defects. On this basis, the examination is considered indeterminate for the exclusion of pulmonary embolism. 3.  Consider CTA of the chest using PE protocol if there is high clinical suspicion for PE and if it would alter clinical management.   This report was finalized on 7/31/2023 8:55 PM by Dr. Chino Jacobson MD.      XR Chest 1 View    Result Date: 7/31/2023  No active disease  This report was finalized on 7/31/2023 1:37 PM by Dr. Paras Mercedes MD.       Ambulatory status:   - up with assistance    Social issues:   Social History     Socioeconomic History    Marital status:     Number of children: 4   Tobacco Use    Smoking status: Never    Smokeless tobacco: Never   Vaping Use    Vaping Use: Never used   Substance and Sexual Activity    Alcohol use: No     Comment: caffeine use    Drug use: No    Sexual activity: Defer       NIH Stroke Scale:         Nalini Jonas RN  07/31/23 22:34 EDT

## 2023-08-01 NOTE — PROGRESS NOTES
"Enter Query Response Below      Query Response: Acute HFpEF present on admission     Electronically signed by Handy Chen MD, 23, 3:05 PM EDT.               If applicable, please update the problem list.       Patient: Antonette King        : 1934  Account: [id number]           Admit Date:         How to Respond to this query:       a. Click New Note     b. Answer query within the yellow box.                c. Update the Problem List, if applicable.      If you have any questions about this query contact me at: [email or web address]     Dr. Chen:    89 year old female admitted with chest pain, elevated blood pressure.  Clinical indicators: History of hypertension.  H&P physical exam-\" Cardiovascular: Regular rate, regular rhythm, S1 normal and S2 normal.  Grade III/VI JESUS best at the [facility]. Extremities: No edema.  \"   proBNP 5,601.0    cxr:  no acitve disease.   Cardiology assessment:  Acute HFpEF , Hypertensive urgency, due to volume overload from steroids  Treatments:  hydrochlorothiazide, lopressor, apresoline, daily weights, intake and output.    Please clarify the following:     Acute HFpEF present on admission   Acute HFpEF  developed during admission              Other- specify______              Unable to determine      By submitting this query, we are merely seeking further clarification of documentation to accurately reflect all conditions that you are monitoring, evaluating, treating or that extend the hospitalization or utilize additional resources of care. Please utilize your independent clinical judgment when addressing the question(s) above.     This query and your response, once completed, will be entered into the legal medical record.    Sincerely,  Arleen Singh RN  Clinical Documentation Integrity Program     "

## 2023-08-02 ENCOUNTER — READMISSION MANAGEMENT (OUTPATIENT)
Dept: CALL CENTER | Facility: HOSPITAL | Age: 88
End: 2023-08-02
Payer: MEDICARE

## 2023-08-02 VITALS
TEMPERATURE: 98.4 F | DIASTOLIC BLOOD PRESSURE: 91 MMHG | BODY MASS INDEX: 30.25 KG/M2 | SYSTOLIC BLOOD PRESSURE: 167 MMHG | HEART RATE: 84 BPM | OXYGEN SATURATION: 93 % | HEIGHT: 64 IN | WEIGHT: 177.2 LBS | RESPIRATION RATE: 20 BRPM

## 2023-08-02 PROBLEM — I16.0 ASYMPTOMATIC HYPERTENSIVE URGENCY: Status: ACTIVE | Noted: 2023-08-02

## 2023-08-02 PROBLEM — I16.0 HYPERTENSIVE URGENCY: Status: ACTIVE | Noted: 2023-08-02

## 2023-08-02 LAB
ANION GAP SERPL CALCULATED.3IONS-SCNC: 12.4 MMOL/L (ref 5–15)
BUN SERPL-MCNC: 35 MG/DL (ref 8–23)
BUN/CREAT SERPL: 24.1 (ref 7–25)
CALCIUM SPEC-SCNC: 8.8 MG/DL (ref 8.6–10.5)
CHLORIDE SERPL-SCNC: 104 MMOL/L (ref 98–107)
CO2 SERPL-SCNC: 22.6 MMOL/L (ref 22–29)
CREAT SERPL-MCNC: 1.45 MG/DL (ref 0.57–1)
EGFRCR SERPLBLD CKD-EPI 2021: 34.6 ML/MIN/1.73
GLUCOSE SERPL-MCNC: 114 MG/DL (ref 65–99)
POTASSIUM SERPL-SCNC: 4.2 MMOL/L (ref 3.5–5.2)
SODIUM SERPL-SCNC: 139 MMOL/L (ref 136–145)

## 2023-08-02 PROCEDURE — 63710000001 HYDROCHLOROTHIAZIDE 12.5 MG TABLET: Performed by: INTERNAL MEDICINE

## 2023-08-02 PROCEDURE — A9270 NON-COVERED ITEM OR SERVICE: HCPCS | Performed by: INTERNAL MEDICINE

## 2023-08-02 PROCEDURE — 63710000001 HYDRALAZINE 25 MG TABLET: Performed by: INTERNAL MEDICINE

## 2023-08-02 PROCEDURE — 96372 THER/PROPH/DIAG INJ SC/IM: CPT

## 2023-08-02 PROCEDURE — 63710000001 ISOSORBIDE MONONITRATE 30 MG TABLET SUSTAINED-RELEASE 24 HOUR: Performed by: INTERNAL MEDICINE

## 2023-08-02 PROCEDURE — 63710000001 METOPROLOL TARTRATE 50 MG TABLET: Performed by: INTERNAL MEDICINE

## 2023-08-02 PROCEDURE — 63710000001 VITAMIN C 500 MG TABLET: Performed by: INTERNAL MEDICINE

## 2023-08-02 PROCEDURE — 80048 BASIC METABOLIC PNL TOTAL CA: CPT | Performed by: INTERNAL MEDICINE

## 2023-08-02 PROCEDURE — 63710000001 ASPIRIN 81 MG CHEWABLE TABLET: Performed by: INTERNAL MEDICINE

## 2023-08-02 PROCEDURE — 63710000001 BUSPIRONE 5 MG TABLET: Performed by: INTERNAL MEDICINE

## 2023-08-02 PROCEDURE — 25010000002 ENOXAPARIN PER 10 MG: Performed by: INTERNAL MEDICINE

## 2023-08-02 PROCEDURE — 63710000001 LOSARTAN 50 MG TABLET: Performed by: INTERNAL MEDICINE

## 2023-08-02 PROCEDURE — 63710000001 CLOPIDOGREL 75 MG TABLET: Performed by: INTERNAL MEDICINE

## 2023-08-02 PROCEDURE — 63710000001 SENNOSIDES-DOCUSATE 8.6-50 MG TABLET: Performed by: INTERNAL MEDICINE

## 2023-08-02 PROCEDURE — G0378 HOSPITAL OBSERVATION PER HR: HCPCS

## 2023-08-02 RX ORDER — HYDRALAZINE HYDROCHLORIDE 25 MG/1
25 TABLET, FILM COATED ORAL 2 TIMES DAILY
Qty: 90 TABLET | Refills: 0 | Status: SHIPPED | OUTPATIENT
Start: 2023-08-02

## 2023-08-02 RX ORDER — ISOSORBIDE MONONITRATE 30 MG/1
30 TABLET, EXTENDED RELEASE ORAL 2 TIMES DAILY
Qty: 60 TABLET | Refills: 0 | Status: SHIPPED | OUTPATIENT
Start: 2023-08-02 | End: 2023-08-10

## 2023-08-02 RX ORDER — ATORVASTATIN CALCIUM 40 MG/1
40 TABLET, FILM COATED ORAL NIGHTLY
Qty: 30 TABLET | Refills: 0 | Status: SHIPPED | OUTPATIENT
Start: 2023-08-02

## 2023-08-02 RX ADMIN — HYDRALAZINE HYDROCHLORIDE 25 MG: 50 TABLET, FILM COATED ORAL at 09:06

## 2023-08-02 RX ADMIN — ASPIRIN 81 MG CHEWABLE TABLET 81 MG: 81 TABLET CHEWABLE at 09:06

## 2023-08-02 RX ADMIN — LOSARTAN POTASSIUM 100 MG: 50 TABLET, FILM COATED ORAL at 09:06

## 2023-08-02 RX ADMIN — CLOPIDOGREL BISULFATE 75 MG: 75 TABLET ORAL at 09:06

## 2023-08-02 RX ADMIN — OXYCODONE HYDROCHLORIDE AND ACETAMINOPHEN 500 MG: 500 TABLET ORAL at 09:06

## 2023-08-02 RX ADMIN — SENNOSIDES AND DOCUSATE SODIUM 2 TABLET: 50; 8.6 TABLET ORAL at 09:06

## 2023-08-02 RX ADMIN — METOPROLOL TARTRATE 50 MG: 50 TABLET, FILM COATED ORAL at 09:06

## 2023-08-02 RX ADMIN — Medication 10 ML: at 09:07

## 2023-08-02 RX ADMIN — ENOXAPARIN SODIUM 30 MG: 30 INJECTION SUBCUTANEOUS at 00:58

## 2023-08-02 RX ADMIN — HYDROCHLOROTHIAZIDE 12.5 MG: 12.5 TABLET ORAL at 09:06

## 2023-08-02 RX ADMIN — BUSPIRONE HYDROCHLORIDE 10 MG: 5 TABLET ORAL at 09:06

## 2023-08-02 RX ADMIN — ISOSORBIDE MONONITRATE 30 MG: 30 TABLET, EXTENDED RELEASE ORAL at 09:06

## 2023-08-02 NOTE — CASE MANAGEMENT/SOCIAL WORK
Case Management Discharge Note      Final Note: Discharged home.    Provided Post Acute Provider List?: Refused  Refused Provider List Comment: pt declined    Selected Continued Care - Discharged on 8/2/2023 Admission date: 7/31/2023 - Discharge disposition: Home or Self Care      Destination    No services have been selected for the patient.                Durable Medical Equipment    No services have been selected for the patient.                Dialysis/Infusion    No services have been selected for the patient.                Home Medical Care    No services have been selected for the patient.                Therapy    No services have been selected for the patient.                Community Resources    No services have been selected for the patient.                Community & DME    No services have been selected for the patient.                         Final Discharge Disposition Code: 01 - home or self-care

## 2023-08-02 NOTE — DISCHARGE INSTR - APPOINTMENTS
Follow up with Dr. Tonny Willis on August 8th at 10:15    Follow up with Dr. Juan Jose Gonzalez on August 10th at 11:00 at [facility] [phone]

## 2023-08-02 NOTE — DISCHARGE SUMMARY
"Antonette King  1934  [id number]    Hospitalists Discharge Summary    Date of Admission: 7/31/2023  Date of Discharge:  8/2/2023    History of Present Illness from HPI on admit: Ms. King is a pleasant, 89 y/o  female who presented from her PCP's office due to elevated blood pressure readings.  She is hard-of-hearing and not a good historian so a majority of the HPI is taken from review of the notes.  Ms. King reports she was followed for about 30 years by Dr. Dai in [place].  She recently changed over to Dr. Willis because he cares for her .  She reports she had routine blood work done that was normal.  She also reports continued low back pain was bothering her today.  However, she also reports checking her blood pressure and her SBP was reading in the 200's.  She repeated her measurement about an hour after taking all of her medication.  It was still very high.  She presented to her PCP due to the pressure readings.  BP in the office was noted to be 179/126.  As noted she was seen earlier in the office, but she was also treated for \"bronchitis\" w/ a 5-day course of Prednisone which just stopped on Saturday.  She reports feeling \"bad\" in the office, but no documented fever of hypoxia is noted.  She has a vague complaint of chest pain.  She was also complaining of some dyspnea.  She was given a nitroglycerin and brought to the ER.     She reports decreased exercise tolerance by way of not being able to complete normal housework chores w/o having to take multiple breaks.  Her back pain also precludes some of this activity.  She denies difficulty sleeping and is eating and drinking normally.  She does reports some \"aches\" in her legs at night, but no weakness.  She does not describe swelling to me.  In fact, she feels like her leg pain developed after her vein harvesting for CABG.  There is an upcoming Cardiology appointment w/ Dr. Gonzalez described in the notes.    Primary Discharge diagnoses: " Acute on chronic heart failure preserved ejection fraction with hypertensive urgency-improved.     Secondary Discharge Diagnoses: JUDAH on likely CKD 3A- creatinine appears to have settled at new baseline of approximately 1.2-1.4.  CAD-stable.  Low back pain-stable on conservative management may need repeat imaging in near future.     Hospital Course Summary: Patient was admitted for hypertensive urgency for which she was tried on a dose of IV labetalol with little resolution in the ED therefore placed on Cardene drip, she would then be evaluated by cardiology who felt she had acute on chronic heart failure preserved ejection fraction related to recent steroid use, she would get a dose of IV furosemide, patient remained stable on room air, dyspnea was improved.  Patient's blood pressure now near goal with addition of Imdur 30 mg twice daily, losartan hydrochlorothiazide, continuation of her home metoprolol, and 3 times daily hydralazine.  Patient also remains on dual antiplatelet therapy to be evaluated by her cardiologist Dr. Gonzalez.  She will have close follow-up with him on discharge.  on 8/2/2023 patient's condition had improved. They were deemed stable for discharge. They were advised to take all medications as prescribed, follow up with PCP within 1 week. If there are any issues patient should contact PCP and/or return to the ED for follow up. Patient was agreeable to the plan and subsequently discharged at this time.     PCP  Patient Care Team:  Tonny Willis MD as PCP - General (Family Medicine)    Consults:   Consults       Date and Time Order Name Status Description    7/31/2023 11:08 PM Inpatient Cardiology Consult Completed             Operations and Procedures Performed:       Adult Transthoracic Echo Complete W/ Cont if Necessary Per Protocol    Result Date: 8/1/2023  Narrative:   Left ventricular systolic function is normal. Left ventricular ejection fraction appears to be 66 - 70%.   Left  ventricular wall thickness is consistent with moderate concentric hypertrophy.   Left ventricular diastolic function is consistent with (grade II w/high LAP) pseudonormalization.   Normal right ventricular cavity size and systolic function noted.   : The left atrial cavity is mildly dilated.   Moderate aortic valve stenosis is present. Aortic valve maximum pressure gradient is 35.3 mmHg. Aortic valve mean pressure gradient is 21.9 mmHg   Insufficient TR velocity profile to estimate the right ventricular systolic pressure.   : There is no evidence of pericardial effusion     NM Lung Ventilation Perfusion    Result Date: 7/31/2023  Narrative: VENTILATION PERFUSION LUNG SCAN, 7/31/2023  HISTORY: 89-year-old female presenting to the ED with dyspnea, chest pain, low oxygen saturation, elevated D-dimer.  DOSE: *  26.5 mCi technetium labeled DTPA inhaled in aerosol. *  5.4 mCi technetium MAA intravenously.  COMPARISON: Chest x-ray today. CTA chest, 7/7/2022.  FINDINGS: Extensive diffuse multifocal ventilatory abnormality in a pattern most typically seen with obstructive airways disease, correlate clinically. Perfusion abnormalities are present in a similar distribution, although less severe than the ventilation abnormality.  No unmatched perfusion abnormality is identified to suggest pulmonary embolism. However, given the extent of ventilatory abnormality, the examination should be considered indeterminate for the exclusion of pulmonary embolism. A significant PE could be present and obscured by diffuse ventilatory abnormality.      Impression: 1.  No unmatched perfusion defect is identified to suggest pulmonary embolism. 2.  Extensive multifocal ventilatory abnormality with corresponding perfusion defects. On this basis, the examination is considered indeterminate for the exclusion of pulmonary embolism. 3.  Consider CTA of the chest using PE protocol if there is high clinical suspicion for PE and if it would alter  clinical management.   This report was finalized on 7/31/2023 8:55 PM by Dr. Chino Jacobson MD.      XR Chest 1 View    Result Date: 7/31/2023  Narrative: AP PORTABLE CHEST, 7/31/2023  HISTORY: Shortness of air and chest discomfort for 2 days  COMPARISON: 7/7/2022 chest CT and 12/15/2021 chest x-ray  TECHNIQUE: AP portable chest x-ray.  FINDINGS: Heart size normal. Sternal wires are present. Anterior left hemidiaphragm is elevated which was seen on the CT scan as well. There are no infiltrates.      Impression: No active disease  This report was finalized on 7/31/2023 1:37 PM by Dr. Paras Mercedes MD.      US Venous Doppler Lower Extremity Bilateral (duplex)    Result Date: 8/1/2023  Narrative: VENOUS DOPPLER ULTRASOUND, BILATERAL LOWER EXTREMITIES, 8/1/2023  HISTORY: Shortness of air and chest pain. Symptoms for the past 3 days  TECHNIQUE: Venous Doppler ultrasound examination of both legs was performed using grey-scale, spectral Doppler, and color flow Doppler ultrasound imaging.  FINDINGS: The examination is negative.  There is no evidence of deep venous thrombosis from the groin to the lower calf bilaterally. The greater saphenous veins are also patent. There are small popliteal fossa/Baker's type cysts bilaterally. Interval decrease in size of the popliteal fossa cyst on the right compared to prior study performed 10/16/2019.      Impression: Negative examination.  No evidence of lower extremity DVT on the right or left.  This report was finalized on 8/1/2023 8:41 AM by Dr. Mitchell Oviedo MD.       Allergies:  is allergic to prednisone.    Nasir  Reviewed    Discharge Medications:     Discharge Medications        ASK your doctor about these medications        Instructions Start Date   allopurinol 300 MG tablet  Commonly known as: ZYLOPRIM   300 mg, Oral, Daily      ascorbic acid 500 MG tablet  Commonly known as: VITAMIN C   500 mg, Oral, Daily      aspirin 81 MG chewable tablet   81 mg, Oral, Daily       busPIRone 10 MG tablet  Commonly known as: BUSPAR   10 mg, Oral, 3 Times Daily      clopidogrel 75 MG tablet  Commonly known as: PLAVIX   75 mg, Oral, Daily      hydrALAZINE 25 MG tablet  Commonly known as: APRESOLINE   25 mg, Oral, Every 8 Hours Scheduled      isosorbide mononitrate 30 MG 24 hr tablet  Commonly known as: IMDUR   TAKE 1 TABLET BY MOUTH EVERY DAY      losartan-hydrochlorothiazide 100-12.5 MG per tablet  Commonly known as: HYZAAR   1 tablet, Oral, Daily      metoprolol tartrate 50 MG tablet  Commonly known as: LOPRESSOR   50 mg, Oral, 2 Times Daily      multivitamin with minerals tablet tablet   1 tablet, Oral, Daily      venlafaxine XR 75 MG 24 hr capsule  Commonly known as: EFFEXOR-XR   75 mg, Oral, Daily      vitamin D3 125 MCG (5000 UT) capsule capsule   5,000 Units, Oral, Daily               Last Lab Results:   Lab Results (most recent)       Procedure Component Value Units Date/Time    Basic Metabolic Panel [[id number]]  (Abnormal) Collected: 08/02/23 0354    Specimen: Blood Updated: 08/02/23 0435     Glucose 114 mg/dL      BUN 35 mg/dL      Creatinine 1.45 mg/dL      Sodium 139 mmol/L      Potassium 4.2 mmol/L      Comment: Slight hemolysis detected by analyzer. Results may be affected.        Chloride 104 mmol/L      CO2 22.6 mmol/L      Calcium 8.8 mg/dL      BUN/Creatinine Ratio 24.1     Anion Gap 12.4 mmol/L      eGFR 34.6 mL/min/1.73     Narrative:      GFR Normal >60  Chronic Kidney Disease <60  Kidney Failure <15    The GFR formula is only valid for adults with stable renal function between ages 18 and 70.    Basic Metabolic Panel [[id number]]  (Abnormal) Collected: 08/01/23 0519    Specimen: Blood from Arm, Left Updated: 08/01/23 0543     Glucose 114 mg/dL      BUN 34 mg/dL      Creatinine 1.29 mg/dL      Sodium 136 mmol/L      Potassium 3.7 mmol/L      Chloride 100 mmol/L      CO2 25.8 mmol/L      Calcium 9.0 mg/dL      BUN/Creatinine Ratio 26.4     Anion Gap 10.2 mmol/L      eGFR 39.8  mL/min/1.73     Narrative:      GFR Normal >60  Chronic Kidney Disease <60  Kidney Failure <15    The GFR formula is only valid for adults with stable renal function between ages 18 and 70.    CBC & Differential [[id number]]  (Abnormal) Collected: 08/01/23 0519    Specimen: Blood from Arm, Left Updated: 08/01/23 0526    Narrative:      The following orders were created for panel order CBC & Differential.  Procedure                               Abnormality         Status                     ---------                               -----------         ------                     CBC Auto Differential[[id number]]        Abnormal            Final result                 Please view results for these tests on the individual orders.    CBC Auto Differential [[id number]]  (Abnormal) Collected: 08/01/23 0519    Specimen: Blood from Arm, Left Updated: 08/01/23 0526     WBC 15.85 10*3/mm3      RBC 3.61 10*6/mm3      Hemoglobin 10.7 g/dL      Hematocrit 33.5 %      MCV 92.8 fL      MCH 29.6 pg      MCHC 31.9 g/dL      RDW 14.9 %      RDW-SD 49.4 fl      MPV 10.3 fL      Platelets 241 10*3/mm3      Neutrophil % 82.0 %      Lymphocyte % 7.9 %      Monocyte % 8.4 %      Eosinophil % 0.6 %      Basophil % 0.3 %      Immature Grans % 0.8 %      Neutrophils, Absolute 12.98 10*3/mm3      Lymphocytes, Absolute 1.26 10*3/mm3      Monocytes, Absolute 1.33 10*3/mm3      Eosinophils, Absolute 0.10 10*3/mm3      Basophils, Absolute 0.05 10*3/mm3      Immature Grans, Absolute 0.13 10*3/mm3      nRBC 0.0 /100 WBC     Renal Function Panel [[id number]]  (Abnormal) Collected: 08/01/23 0107    Specimen: Blood Updated: 08/01/23 0134     Glucose 114 mg/dL      BUN 38 mg/dL      Creatinine 1.42 mg/dL      Sodium 132 mmol/L      Potassium 4.8 mmol/L      Comment: Specimen hemolyzed.  Results may be affected.        Chloride 98 mmol/L      CO2 22.7 mmol/L      Calcium 9.1 mg/dL      Albumin 3.4 g/dL      Phosphorus 2.9 mg/dL      Anion Gap 11.3 mmol/L       "BUN/Creatinine Ratio 26.8     eGFR 35.4 mL/min/1.73     Narrative:      GFR Normal >60  Chronic Kidney Disease <60  Kidney Failure <15    The GFR formula is only valid for adults with stable renal function between ages 18 and 70.    TSH [[id number]]  (Normal) Collected: 07/31/23 1301    Specimen: Blood Updated: 07/31/23 2350     TSH 3.060 uIU/mL     Procalcitonin [[id number]]  (Normal) Collected: 07/31/23 1504    Specimen: Blood Updated: 07/31/23 2335     Procalcitonin 0.07 ng/mL     Narrative:      As a Marker for Sepsis (Non-Neonates):    1. <0.5 ng/mL represents a low risk of severe sepsis and/or septic shock.  2. >2 ng/mL represents a high risk of severe sepsis and/or septic shock.    As a Marker for Lower Respiratory Tract Infections that require antibiotic therapy:    PCT on Admission    Antibiotic Therapy       6-12 Hrs later    >0.5                Strongly Recommended  >0.25 - <0.5        Recommended   0.1 - 0.25          Discouraged              Remeasure/reassess PCT  <0.1                Strongly Discouraged     Remeasure/reassess PCT    As 28 day mortality risk marker: \"Change in Procalcitonin Result\" (>80% or <=80%) if Day 0 (or Day 1) and Day 4 values are available. Refer to http://www.[facility]-pct-calculator.com    Change in PCT <=80%  A decrease of PCT levels below or equal to 80% defines a positive change in PCT test result representing a higher risk for 28-day all-cause mortality of patients diagnosed with severe sepsis for septic shock.    Change in PCT >80%  A decrease of PCT levels of more than 80% defines a negative change in PCT result representing a lower risk for 28-day all-cause mortality of patients diagnosed with severe sepsis or septic shock.       Lipid Panel [[id number]]  (Abnormal) Collected: 07/31/23 1301    Specimen: Blood Updated: 07/31/23 2330     Total Cholesterol 221 mg/dL      Triglycerides 115 mg/dL      HDL Cholesterol 77 mg/dL      LDL Cholesterol  124 mg/dL      VLDL Cholesterol " "20 mg/dL      LDL/HDL Ratio 1.57    Narrative:      Cholesterol Reference Ranges  (U.S. Department of Health and Human Services ATP III Classifications)    Desirable          <200 mg/dL  Borderline High    200-239 mg/dL  High Risk          >240 mg/dL      Triglyceride Reference Ranges  (U.S. Department of Health and Human Services ATP III Classifications)    Normal           <150 mg/dL  Borderline High  150-199 mg/dL  High             200-499 mg/dL  Very High        >500 mg/dL    HDL Reference Ranges  (U.S. Department of Health and Human Services ATP III Classifications)    Low     <40 mg/dl (major risk factor for CHD)  High    >60 mg/dl ('negative' risk factor for CHD)        LDL Reference Ranges  (U.S. Department of Health and Human Services ATP III Classifications)    Optimal          <100 mg/dL  Near Optimal     100-129 mg/dL  Borderline High  130-159 mg/dL  High             160-189 mg/dL  Very High        >189 mg/dL    D-dimer, Quantitative [[id number]]  (Abnormal) Collected: 07/31/23 1301    Specimen: Blood Updated: 07/31/23 1616     D-Dimer, Quantitative 6.09 MCGFEU/mL     Narrative:      According to the assay 's published package insert, a normal (<0.50 MCGFEU/mL) D-dimer result in conjunction with a non-high clinical probability assessment, excludes deep vein thrombosis (DVT) and pulmonary embolism (PE) with high sensitivity.    D-dimer values increase with age and this can make VTE exclusion of an older population difficult. To address this, the [employer], based on best available evidence and recent guidelines, recommends that clinicians use age-adjusted D-dimer thresholds in patients greater than 50 years of age with: a) a low probability of PE who do not meet all Pulmonary Embolism Rule Out Criteria, or b) in those with intermediate probability of PE.   The formula for an age-adjusted D-dimer cut-off is \"age/100\".  For example, a 60 year old patient would have an " age-adjusted cut-off of 0.60 MCGFEU/mL and an 80 year old 0.80 MCGFEU/mL.    Urinalysis, Microscopic Only - Urine, Clean Catch [[id number]]  (Abnormal) Collected: 07/31/23 1538    Specimen: Urine, Clean Catch Updated: 07/31/23 1600     RBC, UA 0-2 /HPF      WBC, UA 3-5 /HPF      Bacteria, UA None Seen /HPF      Squamous Epithelial Cells, UA 3-6 /HPF      Hyaline Casts, UA None Seen /LPF      Methodology Manual Light Microscopy    Urinalysis With Microscopic If Indicated (No Culture) - Urine, Clean Catch [[id number]]  (Abnormal) Collected: 07/31/23 1538    Specimen: Urine, Clean Catch Updated: 07/31/23 1600     Color, UA Yellow     Appearance, UA Slightly Cloudy     pH, UA 7.0     Specific Gravity, UA 1.020     Glucose, UA Negative     Ketones, UA Negative     Bilirubin, UA Negative     Blood, UA Negative     Protein, UA 30 mg/dL (1+)     Leuk Esterase, UA Trace     Nitrite, UA Negative     Urobilinogen, UA 0.2 E.U./dL    High Sensitivity Troponin T 2Hr [[id number]]  (Abnormal) Collected: 07/31/23 1504    Specimen: Blood Updated: 07/31/23 1536     HS Troponin T 39 ng/L      Troponin T Delta -2 ng/L     Narrative:      High Sensitive Troponin T Reference Range:  <10.0 ng/L- Negative Female for AMI  <15.0 ng/L- Negative Male for AMI  >=10 - Abnormal Female indicating possible myocardial injury.  >=15 - Abnormal Male indicating possible myocardial injury.   Clinicians would have to utilize clinical acumen, EKG, Troponin, and serial changes to determine if it is an Acute Myocardial Infarction or myocardial injury due to an underlying chronic condition.         COVID PRE-OP / PRE-PROCEDURE SCREENING ORDER (NO ISOLATION) - Swab, Nasopharynx [[id number]]  (Normal) Collected: 07/31/23 1414    Specimen: Swab from Nasopharynx Updated: 07/31/23 1459    Narrative:      The following orders were created for panel order COVID PRE-OP / PRE-PROCEDURE SCREENING ORDER (NO ISOLATION) - Swab, Nasopharynx.  Procedure                                Abnormality         Status                     ---------                               -----------         ------                     COVID-19 and FLU A/B PCR...[[id number]]  Normal              Final result                 Please view results for these tests on the individual orders.    COVID-19 and FLU A/B PCR - Swab, Nasopharynx [[id number]]  (Normal) Collected: 07/31/23 1414    Specimen: Swab from Nasopharynx Updated: 07/31/23 1459     COVID19 Not Detected     Influenza A PCR Not Detected     Influenza B PCR Not Detected    Narrative:      Fact sheet for providers: https://www.fda.gov/media/699360/download    Fact sheet for patients: https://www.fda.gov/media/965222/download    Test performed by PCR.    CBC & Differential [[id number]]  (Abnormal) Collected: 07/31/23 1301    Specimen: Blood Updated: 07/31/23 1400    Narrative:      The following orders were created for panel order CBC & Differential.  Procedure                               Abnormality         Status                     ---------                               -----------         ------                     CBC Auto Differential[[id number]]        Abnormal            Final result               Scan Slide[[id number]]                                       Final result                 Please view results for these tests on the individual orders.    Scan Slide [[id number]] Collected: 07/31/23 1301    Specimen: Blood Updated: 07/31/23 1400     RBC Morphology Normal     WBC Morphology Normal     Platelet Estimate Adequate    BNP [[id number]]  (Abnormal) Collected: 07/31/23 1301    Specimen: Blood Updated: 07/31/23 1347     proBNP 5,601.0 pg/mL     Narrative:      Among patients with dyspnea, NT-proBNP is highly sensitive for the detection of acute congestive heart failure. In addition NT-proBNP of <300 pg/ml effectively rules out acute congestive heart failure with 99% negative predictive value.    Results may be falsely decreased if patient taking  Biotin.      High Sensitivity Troponin T [[id number]]  (Abnormal) Collected: 07/31/23 1301    Specimen: Blood Updated: 07/31/23 1332     HS Troponin T 41 ng/L     Narrative:      High Sensitive Troponin T Reference Range:  <10.0 ng/L- Negative Female for AMI  <15.0 ng/L- Negative Male for AMI  >=10 - Abnormal Female indicating possible myocardial injury.  >=15 - Abnormal Male indicating possible myocardial injury.   Clinicians would have to utilize clinical acumen, EKG, Troponin, and serial changes to determine if it is an Acute Myocardial Infarction or myocardial injury due to an underlying chronic condition.         Comprehensive Metabolic Panel [[id number]]  (Abnormal) Collected: 07/31/23 1301    Specimen: Blood Updated: 07/31/23 1330     Glucose 107 mg/dL      BUN 46 mg/dL      Creatinine 1.70 mg/dL      Sodium 136 mmol/L      Potassium 4.3 mmol/L      Chloride 99 mmol/L      CO2 25.8 mmol/L      Calcium 9.8 mg/dL      Total Protein 7.1 g/dL      Albumin 4.1 g/dL      ALT (SGPT) 42 U/L      AST (SGOT) 30 U/L      Alkaline Phosphatase 75 U/L      Total Bilirubin 0.6 mg/dL      Globulin 3.0 gm/dL      A/G Ratio 1.4 g/dL      BUN/Creatinine Ratio 27.1     Anion Gap 11.2 mmol/L      eGFR 28.5 mL/min/1.73     Narrative:      GFR Normal >60  Chronic Kidney Disease <60  Kidney Failure <15    The GFR formula is only valid for adults with stable renal function between ages 18 and 70.    CBC Auto Differential [[id number]]  (Abnormal) Collected: 07/31/23 1301    Specimen: Blood Updated: 07/31/23 1320     WBC 18.18 10*3/mm3      RBC 4.19 10*6/mm3      Hemoglobin 12.3 g/dL      Hematocrit 40.1 %      MCV 95.7 fL      MCH 29.4 pg      MCHC 30.7 g/dL      RDW 14.7 %      RDW-SD 51.4 fl      MPV 10.1 fL      Platelets 287 10*3/mm3      Neutrophil % 77.1 %      Lymphocyte % 10.5 %      Monocyte % 9.6 %      Eosinophil % 1.6 %      Basophil % 0.5 %      Immature Grans % 0.7 %      Neutrophils, Absolute 14.02 10*3/mm3       Lymphocytes, Absolute 1.91 10*3/mm3      Monocytes, Absolute 1.75 10*3/mm3      Eosinophils, Absolute 0.29 10*3/mm3      Basophils, Absolute 0.09 10*3/mm3      Immature Grans, Absolute 0.12 10*3/mm3           Imaging Results (Most Recent)       Procedure Component Value Units Date/Time    US Venous Doppler Lower Extremity Bilateral (duplex) [[id number]] Collected: 08/01/23 0740     Updated: 08/01/23 0843    Narrative:      VENOUS DOPPLER ULTRASOUND, BILATERAL LOWER EXTREMITIES, 8/1/2023     HISTORY:   Shortness of air and chest pain. Symptoms for the past 3 days     TECHNIQUE:   Venous Doppler ultrasound examination of both legs was performed using  grey-scale, spectral Doppler, and color flow Doppler ultrasound imaging.     FINDINGS:   The examination is negative.  There is no evidence of deep venous  thrombosis from the groin to the lower calf bilaterally. The greater  saphenous veins are also patent. There are small popliteal fossa/Baker's  type cysts bilaterally. Interval decrease in size of the popliteal fossa  cyst on the right compared to prior study performed 10/16/2019.       Impression:      Negative examination.  No evidence of lower extremity DVT on the right  or left.     This report was finalized on 8/1/2023 8:41 AM by Dr. Mitchell Oviedo MD.       NM Lung Ventilation Perfusion [[id number]] Collected: 07/31/23 1950     Updated: 07/31/23 2057    Narrative:      VENTILATION PERFUSION LUNG SCAN, 7/31/2023     HISTORY:   89-year-old female presenting to the ED with dyspnea, chest pain, low  oxygen saturation, elevated D-dimer.     DOSE:   *  26.5 mCi technetium labeled DTPA inhaled in aerosol.  *  5.4 mCi technetium MAA intravenously.     COMPARISON:   Chest x-ray today. CTA chest, 7/7/2022.     FINDINGS:   Extensive diffuse multifocal ventilatory abnormality in a pattern most  typically seen with obstructive airways disease, correlate clinically.  Perfusion abnormalities are present in a similar distribution,  although  less severe than the ventilation abnormality.     No unmatched perfusion abnormality is identified to suggest pulmonary  embolism. However, given the extent of ventilatory abnormality, the  examination should be considered indeterminate for the exclusion of  pulmonary embolism. A significant PE could be present and obscured by  diffuse ventilatory abnormality.       Impression:      1.  No unmatched perfusion defect is identified to suggest pulmonary  embolism.  2.  Extensive multifocal ventilatory abnormality with corresponding  perfusion defects. On this basis, the examination is considered  indeterminate for the exclusion of pulmonary embolism.  3.  Consider CTA of the chest using PE protocol if there is high  clinical suspicion for PE and if it would alter clinical management.        This report was finalized on 7/31/2023 8:55 PM by Dr. Chino Jacobson MD.       XR Chest 1 View [[id number]] Collected: 07/31/23 1235     Updated: 07/31/23 1339    Narrative:      AP PORTABLE CHEST, 7/31/2023     HISTORY:  Shortness of air and chest discomfort for 2 days     COMPARISON:  7/7/2022 chest CT and 12/15/2021 chest x-ray     TECHNIQUE:  AP portable chest x-ray.     FINDINGS:  Heart size normal. Sternal wires are present. Anterior left  hemidiaphragm is elevated which was seen on the CT scan as well. There  are no infiltrates.       Impression:      No active disease     This report was finalized on 7/31/2023 1:37 PM by Dr. Paras Mercedes MD.               PROCEDURES      Condition on Discharge:  Stable, Improved.     Physical Exam at Discharge  Vital Signs  Temp:  [98.3 øF (36.8 øC)-99.2 øF (37.3 øC)] 98.4 øF (36.9 øC)  Heart Rate:  [] 83  Resp:  [16-21] 20  BP: (131-175)/() 131/82    Physical Exam  Physical Exam:  General: Patient is awake and alert.  Elderly female. No acute distress noted.   HENT: Head is atraumatic, normocephalic. Hearing is grossly intact. Nose is without obvious congestion and  appears patent. Neck is supple and trachea is midline.   Eyes: Vision is grossly intact. Pupils appear equal and round.   Cardiovascular: Heart has regular rate and rhythm with no murmurs, rubs or gallops noted.   Respiratory: Lungs are clear to ausculation without wheezes, rhonchi or rales.   Abdominal/GI: Soft, nontender, bowel sounds present. No rebound or guarding present.   Extremities: No peripheral edema noted.   Musculoskeletal: Spontaneous movement of bilateral upper and lower extremities against gravity noted. No signs of injury or deformity noted.   Skin: Warm and dry.   Psych: Mood and affect are appropriate. Cooperative with exam.   Neuro: No facial asymmetry noted. No focal deficits noted, hearing and vision are grossly intact.      Discharge Disposition  To home in stable condition    Visiting Nurse:    No    Home PT/OT:  No    Home Safety Evaluation:  No    DME  No new equipment    Discharge Diet:      Dietary Orders (From admission, onward)       Start     Ordered    08/01/23 1239  Diet: Cardiac Diets; Healthy Heart (2-3 Na+); Texture: Regular Texture (IDDSI 7); Fluid Consistency: Thin (IDDSI 0)  Diet Effective Now        References:    Diet Order [facility]   Question Answer Comment   Diets: Cardiac Diets    Cardiac Diet: Healthy Heart (2-3 Na+)    Texture: Regular Texture (IDDSI 7)    Fluid Consistency: Thin (IDDSI 0)        08/01/23 1238                    Activity at Discharge:  As tolerated    Pre-discharge education  None      Follow-up Appointments  No future appointments.      Test Results Pending at Discharge      Discharge over 30 min (if over 30 minutes give explanation as to why it took greater than 30 minutes)  Secondary to:   Coordination of care/follow up  Medication reconciliation  D/W patient      At [facility], we believe that sharing information builds trust and better relationships. You are receiving this note because you recently visited [facility]. It is possible you  will see health information before a provider has talked with you about it. This kind of information can be easy to misunderstand. To help you fully understand what it means for your health, we urge you to discuss this note with your provider.    Aldair Rae DO  08/02/23  06:59 EDT

## 2023-08-02 NOTE — PLAN OF CARE
Goal Outcome Evaluation:  Plan of Care Reviewed With: patient        Progress: improving  Outcome Evaluation: VS Stable, BP controlled with PO regimen, no PRN meds given. On RA. Pt rested well throughout night. F/C DC'd this AM. Due to void.

## 2023-08-10 ENCOUNTER — OFFICE VISIT (OUTPATIENT)
Dept: CARDIOLOGY | Facility: CLINIC | Age: 88
End: 2023-08-10
Payer: MEDICARE

## 2023-08-10 VITALS
HEART RATE: 81 BPM | OXYGEN SATURATION: 95 % | DIASTOLIC BLOOD PRESSURE: 82 MMHG | HEIGHT: 64 IN | SYSTOLIC BLOOD PRESSURE: 130 MMHG | BODY MASS INDEX: 30.39 KG/M2 | WEIGHT: 178 LBS

## 2023-08-10 DIAGNOSIS — Z95.1 S/P CABG (CORONARY ARTERY BYPASS GRAFT): ICD-10-CM

## 2023-08-10 DIAGNOSIS — E78.2 MIXED HYPERLIPIDEMIA: ICD-10-CM

## 2023-08-10 DIAGNOSIS — I10 PRIMARY HYPERTENSION: ICD-10-CM

## 2023-08-10 DIAGNOSIS — G30.1 DEMENTIA OF THE ALZHEIMER'S TYPE WITH LATE ONSET WITHOUT BEHAVIORAL DISTURBANCE: ICD-10-CM

## 2023-08-10 DIAGNOSIS — I25.118 CORONARY ARTERY DISEASE OF NATIVE ARTERY OF NATIVE HEART WITH STABLE ANGINA PECTORIS: Primary | ICD-10-CM

## 2023-08-10 DIAGNOSIS — F02.80 DEMENTIA OF THE ALZHEIMER'S TYPE WITH LATE ONSET WITHOUT BEHAVIORAL DISTURBANCE: ICD-10-CM

## 2023-08-10 PROBLEM — I25.10 CAD (CORONARY ARTERY DISEASE): Status: ACTIVE | Noted: 2023-08-10

## 2023-08-10 RX ORDER — ISOSORBIDE MONONITRATE 30 MG/1
30 TABLET, EXTENDED RELEASE ORAL DAILY
Qty: 90 TABLET | Refills: 3 | Status: SHIPPED | OUTPATIENT
Start: 2023-08-10

## 2023-08-10 NOTE — PROGRESS NOTES
"    Subjective:     Encounter Date:08/10/23      Patient ID: Antonette King is a 89 y.o. female.    Chief Complaint:  History of Present Illness    Dear Dulce,    I had the pleasure of seeing this patient in the office today.  She comes in for hospital follow-up.    Patient has a history of CAD, status post CABG in 2000 at [facility].  I apparently saw her at the time but there are no records available and I do not know the details of her bypass.  She does not recall who did the surgery.      She was just hospitalized at [facility].  She came initially with some elevated blood pressure.  She also complained of some low back pain and chest pain.  She was seen by Dr. Chen.  She had an echocardiogram performed, reviewed in detail below.  She is felt to have acute on chronic diastolic heart failure in part related to recent steroid use.  When they sent her home they sent her home on Imdur 30 mg twice daily; she had been on 30 mg once daily as well.      The following portions of the patient's history were reviewed and updated as appropriate: allergies, current medications, past family history, past medical history, past social history, past surgical history and problem list.    Procedures       Objective:     Vitals:    08/10/23 1107   BP: 130/82   BP Location: Left arm   Patient Position: Sitting   Pulse: 81   SpO2: 95%   Weight: 80.7 kg (178 lb)   Height: 162.6 cm (64\")     Body mass index is 30.55 kg/mý.       General Appearance:    Alert, cooperative, in no acute distress   Head:    Normocephalic, without obvious abnormality   Eyes:            Lids and lashes normal, conjunctivae and sclerae normal, no icterus, no pallor, corneas clear   Ears:    Ears appear intact with no abnormalities noted   Throat:   No oral lesions, oral mucosa moist   Neck:   No adenopathy, supple, trachea midline, no thyromegaly, no carotid bruit, no JVD   Back:     No kyphosis present, no erythema or scars, no tenderness to " palpation    Lungs:     Clear to auscultation,respirations regular, even and unlabored    Heart:    Regular rhythm and normal rate, normal S1 and S2, no murmur, no gallop, no rub, no click   Chest Wall:    No abnormalities observed   Abdomen:     Normal bowel sounds, no masses, no organomegaly, soft        non-tender, non-distended, no guarding   Extremities:   Moves all extremities well, no edema, no cyanosis, no redness   Pulses:  Bilateral carotids brisk   Skin:  Psychiatric:   No bleeding or rash    Alert        Data and records reviewed:     Lab Results   Component Value Date    GLUCOSE 114 (H) 08/02/2023    BUN 35 (H) 08/02/2023    CREATININE 1.45 (H) 08/02/2023    EGFRIFNONA 63 04/16/2021    BCR 24.1 08/02/2023    K 4.2 08/02/2023    CO2 22.6 08/02/2023    CALCIUM 8.8 08/02/2023    ALBUMIN 3.4 (L) 08/01/2023    AST 30 07/31/2023    ALT 42 (H) 07/31/2023     Lab Results   Component Value Date    CHOL 221 (H) 07/31/2023     Lab Results   Component Value Date    TRIG 115 07/31/2023     Lab Results   Component Value Date    HDL 77 (H) 07/31/2023     Lab Results   Component Value Date     (H) 07/31/2023     Lab Results   Component Value Date    VLDL 20 07/31/2023     Lab Results   Component Value Date    LDLHDL 1.57 07/31/2023     CBC          7/31/2023    13:01 8/1/2023    05:19   CBC   WBC 18.18  15.85    RBC 4.19  3.61    Hemoglobin 12.3  10.7    Hematocrit 40.1  33.5    MCV 95.7  92.8    MCH 29.4  29.6    MCHC 30.7  31.9    RDW 14.7  14.9    Platelets 287  241    NM Lung Ventilation Perfusion    Result Date: 7/31/2023  1.  No unmatched perfusion defect is identified to suggest pulmonary embolism. 2.  Extensive multifocal ventilatory abnormality with corresponding perfusion defects. On this basis, the examination is considered indeterminate for the exclusion of pulmonary embolism. 3.  Consider CTA of the chest using PE protocol if there is high clinical suspicion for PE and if it would alter clinical  management.   This report was finalized on 7/31/2023 8:55 PM by Dr. Chino Jacobson MD.      XR Chest 1 View    Result Date: 7/31/2023  No active disease  This report was finalized on 7/31/2023 1:37 PM by Dr. Paras Mercedes MD.      US Venous Doppler Lower Extremity Bilateral (duplex)    Result Date: 8/1/2023  Negative examination.  No evidence of lower extremity DVT on the right or left.  This report was finalized on 8/1/2023 8:41 AM by Dr. Mitchell Oviedo MD.     Results for orders placed during the hospital encounter of 07/31/23    Adult Transthoracic Echo Complete W/ Cont if Necessary Per Protocol    Interpretation Summary    Left ventricular systolic function is normal. Left ventricular ejection fraction appears to be 66 - 70%.    Left ventricular wall thickness is consistent with moderate concentric hypertrophy.    Left ventricular diastolic function is consistent with (grade II w/high LAP) pseudonormalization.    Normal right ventricular cavity size and systolic function noted.    : The left atrial cavity is mildly dilated.    Moderate aortic valve stenosis is present. Aortic valve maximum pressure gradient is 35.3 mmHg. Aortic valve mean pressure gradient is 21.9 mmHg    Insufficient TR velocity profile to estimate the right ventricular systolic pressure.    : There is no evidence of pericardial effusion    Stress test October 2022  Interpretation Summary         Myocardial perfusion imaging indicates a medium-sized, moderately severe area of ischemia located in the inferior wall and lateral wall.    Left ventricular ejection fraction is hyperdynamic (Calculated EF > 70%). .           Assessment:          Diagnosis Plan   1. Coronary artery disease of native artery of native heart with stable angina pectoris        2. S/P CABG (coronary artery bypass graft)        3. Primary hypertension        4. Mixed hyperlipidemia        5. 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  Abnormal stress test, inferolateral ischemia, discussed cardiac catheterization versus augmentation of medical therapy, we started isosorbide mononitrate 30 mg once daily and since then she has been doing well with no angina pectoris.  This point we will continue that  2.  Coronary artery disease- status post CABG 2000, no details of the surgery.  Has remained on dual antiplatelet therapy ever since her surgery, now that she is doing well with no angina pectoris we will stop the clopidogrel and continue the aspirin.  Agree with Dr. Chen that given her age we prefer to have her on a single antiplatelet agent  3.  Hypertension, remains on combination metoprolol and losartan along with hydralazine  4.  Mixed hyperlipidemia on pravastatin, AST, ALT reviewed    Thank you very much for allowing us to participate in the care of this pleasant patient.  Please don't hesitate to call if I can be of assistance in any way.        Current Outpatient Medications:     allopurinol (ZYLOPRIM) 300 MG tablet, Take 1 tablet by mouth Daily., Disp: , Rfl:     ascorbic acid (VITAMIN C) 500 MG tablet, Take 1 tablet by mouth Daily., Disp: , Rfl:     aspirin 81 MG chewable tablet, Chew 1 tablet Daily., Disp: , Rfl:     atorvastatin (LIPITOR) 40 MG tablet, Take 1 tablet by mouth Every Night., Disp: 30 tablet, Rfl: 0    busPIRone (BUSPAR) 10 MG tablet, Take 1 tablet by mouth 3 (Three) Times a Day., Disp: , Rfl:     hydrALAZINE (APRESOLINE) 25 MG tablet, Take 1 tablet by mouth 2 (Two) Times a Day., Disp: 90 tablet, Rfl: 0    isosorbide mononitrate (IMDUR) 30 MG 24 hr tablet, Take 1 tablet by mouth Daily., Disp: 90 tablet, Rfl: 3    losartan-hydrochlorothiazide (HYZAAR) 100-12.5 MG per tablet, Take 1 tablet by mouth Daily., Disp: , Rfl:     metoprolol tartrate (LOPRESSOR) 50 MG tablet, Take 1 tablet by mouth 2 (Two) Times a Day., Disp: , Rfl:     multivitamin with minerals tablet tablet, Take 1 tablet by mouth Daily.,  Disp: , Rfl:     venlafaxine XR (EFFEXOR-XR) 75 MG 24 hr capsule, Take 1 capsule by mouth Daily., Disp: , Rfl:     vitamin D3 125 MCG (5000 UT) capsule capsule, Take 1 capsule by mouth Daily., Disp: , Rfl:          Return in about 6 months (around 2/10/2024).

## 2023-08-11 ENCOUNTER — READMISSION MANAGEMENT (OUTPATIENT)
Dept: CALL CENTER | Facility: HOSPITAL | Age: 88
End: 2023-08-11
Payer: MEDICARE

## 2023-08-11 NOTE — OUTREACH NOTE
CHF Week 2 Survey      Flowsheet Row Responses   [facility] facility patient discharged from? [place]   Does the patient have one of the following disease processes/diagnoses(primary or secondary)? CHF   Week 2 attempt successful? No   Unsuccessful attempts Attempt 1            Bárbara PROCTOR - Registered Nurse

## 2023-08-17 ENCOUNTER — TELEPHONE (OUTPATIENT)
Dept: CARDIAC SURGERY | Facility: CLINIC | Age: 88
End: 2023-08-17
Payer: MEDICARE

## 2023-08-18 ENCOUNTER — READMISSION MANAGEMENT (OUTPATIENT)
Dept: CALL CENTER | Facility: HOSPITAL | Age: 88
End: 2023-08-18
Payer: MEDICARE

## 2023-08-18 NOTE — OUTREACH NOTE
CHF Week 3 Survey      Flowsheet Row Responses   [facility] facility patient discharged from? [place]   Does the patient have one of the following disease processes/diagnoses(primary or secondary)? CHF   Week 3 attempt successful? No   Unsuccessful attempts Attempt 1  [Reached Kasey, son, who states will visit patient tomorrow, and will have her call if she has any questions/concerns.]            Lexii CORONA - Registered Nurse

## 2023-08-23 ENCOUNTER — READMISSION MANAGEMENT (OUTPATIENT)
Dept: CALL CENTER | Facility: HOSPITAL | Age: 88
End: 2023-08-23
Payer: MEDICARE

## 2023-08-23 NOTE — OUTREACH NOTE
CHF Week 3 Survey      Flowsheet Row Responses   [facility] patient discharged from? [place]   Does the patient have one of the following disease processes/diagnoses(primary or secondary)? CHF   Week 3 attempt successful? Yes   Call start time 1433   Call end time 1439   Discharge diagnosis Chest pain   Person spoke with today (if not patient) and relationship Pola, spouse   Medication alerts for this patient reports plavix d/c'd at cardiology f/u   Meds reviewed with patient/caregiver? Yes   Is the patient having any side effects they believe may be caused by any medication additions or changes? No   Does the patient have all medications ordered at discharge? Yes   Prescription comments  discusses that he feels that buspar tid is too much for pt as she has been sleeping too much,  he has only been giving bid for last few days and reports she has done ok---encouraged to call PCP to discuss any changes with medications and pt response   Is the patient taking all medications as directed (includes completed medication regime)? Yes   Comments regarding appointments Cardiology 8/10/23   Does the patient have a primary care provider?  Yes   Has the patient kept scheduled appointments due by today? Yes   Has home health visited the patient within 72 hours of discharge? N/A   Psychosocial issues? No   Did the patient receive a copy of their discharge instructions? Yes   Nursing interventions Reviewed instructions with patient   What is the patient's perception of their health status since discharge? Improving  [Denies any edema, wt gain or SOA--reports pt is napping at time of call.]   Nursing interventions Nurse provided patient education   Is the patient able to teach back signs and symptoms of worsening condition? (i.e. weight gain, shortness of air, etc.) Yes   CHF Zone this Call Green Zone   Green Zone Patient reports doing well, No new or worsening shortness of breath, No new swelling -  feet, ankles  and legs look normal for you   Green Zone Interventions Daily weight check, Low sodium diet, Meds as directed   CHF Week 3 call completed? Yes   Graduated Yes   Call end time 2357            YING RAE - Registered Nurse

## 2023-08-31 ENCOUNTER — TELEPHONE (OUTPATIENT)
Dept: CARDIAC SURGERY | Facility: CLINIC | Age: 88
End: 2023-08-31
Payer: MEDICARE

## 2023-08-31 NOTE — TELEPHONE ENCOUNTER
LVM for return call to discuss ct orders and f/u appt once testing completed. Patient on recall list.

## 2023-09-06 NOTE — TELEPHONE ENCOUNTER
Per Mr King, Mrs King is currently in bed sleeping. Discussed CT orders and yearly f/u appt. He states they were told by cardiologist that she was fine and did not need to complete any additional testing as it was not needed. Discussed Mrs King being following by Cardiac Surgery office for aorta aneurysm/ectasia clinic.  will call the office if  would like to proceed with testing and appt.

## 2023-09-07 ENCOUNTER — APPOINTMENT (OUTPATIENT)
Dept: URBAN - METROPOLITAN AREA CLINIC 239 | Age: 88
Setting detail: DERMATOLOGY
End: 2023-09-07

## 2023-09-07 PROBLEM — C44.01 BASAL CELL CARCINOMA OF SKIN OF LIP: Status: ACTIVE | Noted: 2023-09-07

## 2023-09-07 PROCEDURE — OTHER PRESCRIPTION: OTHER

## 2023-09-07 PROCEDURE — 14061 TIS TRNFR E/N/E/L10.1-30SQCM: CPT

## 2023-09-07 PROCEDURE — 17312 MOHS ADDL STAGE: CPT

## 2023-09-07 PROCEDURE — 17311 MOHS 1 STAGE H/N/HF/G: CPT

## 2023-09-07 PROCEDURE — OTHER MOHS SURGERY: OTHER

## 2023-09-07 PROCEDURE — OTHER CONSULTATION FOR MOHS SURGERY: OTHER

## 2023-09-07 RX ORDER — CEPHALEXIN 500 MG/1
CAPSULE ORAL
Qty: 15 | Refills: 0 | Status: ERX | COMMUNITY
Start: 2023-09-07

## 2023-09-07 NOTE — PROCEDURE: MOHS SURGERY
Consent (Temporal Branch)/Introductory Paragraph: The rationale for Mohs was explained to the patient and consent was obtained. The risks, benefits and alternatives to therapy were discussed in detail. Specifically, the risks of damage to the temporal branch of the facial nerve, infection, scarring, bleeding, prolonged wound healing, incomplete removal, allergy to anesthesia, and recurrence were addressed. Prior to the procedure, the treatment site was clearly identified and confirmed by the patient. All components of Universal Protocol/PAUSE Rule completed. modificaitons made to body of note

## 2023-09-19 ENCOUNTER — APPOINTMENT (OUTPATIENT)
Dept: URBAN - METROPOLITAN AREA CLINIC 239 | Age: 88
Setting detail: DERMATOLOGY
End: 2023-09-22

## 2023-09-19 DIAGNOSIS — Z48.02 ENCOUNTER FOR REMOVAL OF SUTURES: ICD-10-CM

## 2023-09-19 PROCEDURE — OTHER COUNSELING: OTHER

## 2023-09-19 PROCEDURE — OTHER TREATMENT REGIMEN: OTHER

## 2023-09-19 PROCEDURE — OTHER PHOTO-DOCUMENTATION: OTHER

## 2023-09-19 PROCEDURE — OTHER SUTURE REMOVAL (GLOBAL PERIOD): OTHER

## 2023-09-19 PROCEDURE — OTHER PRESCRIPTION: OTHER

## 2023-09-19 PROCEDURE — 99024 POSTOP FOLLOW-UP VISIT: CPT

## 2023-09-19 RX ORDER — MUPIROCIN 20 MG/G
OINTMENT TOPICAL
Qty: 22 | Refills: 0 | Status: ERX | COMMUNITY
Start: 2023-09-19

## 2023-09-19 ASSESSMENT — LOCATION ZONE DERM: LOCATION ZONE: LIP

## 2023-09-19 ASSESSMENT — LOCATION DETAILED DESCRIPTION DERM: LOCATION DETAILED: PHILTRUM

## 2023-09-19 ASSESSMENT — LOCATION SIMPLE DESCRIPTION DERM: LOCATION SIMPLE: UPPER LIP

## 2023-09-19 NOTE — PROCEDURE: TREATMENT REGIMEN
Detail Level: Zone
Initiate Treatment: mupirocin 2 % topical ointment \\nApply to affected areas on lips once daily for the next week

## 2023-09-19 NOTE — PROCEDURE: PHOTO-DOCUMENTATION
Detail Level: Zone
Details (Free Text): 2
Photo Preface (Leave Blank If You Do Not Want): Photographs were obtained today

## 2023-09-19 NOTE — PROCEDURE: SUTURE REMOVAL (GLOBAL PERIOD)
Detail Level: Detailed
Add 62415 Cpt? (Important Note: In 2017 The Use Of 71978 Is Being Tracked By Cms To Determine Future Global Period Reimbursement For Global Periods): yes

## 2023-11-05 ENCOUNTER — HOSPITAL ENCOUNTER (EMERGENCY)
Facility: HOSPITAL | Age: 88
Discharge: HOME | End: 2023-11-05
Attending: EMERGENCY MEDICINE | Admitting: EMERGENCY MEDICINE
Payer: MEDICARE

## 2023-11-05 VITALS
OXYGEN SATURATION: 90 % | HEIGHT: 63 IN | HEART RATE: 68 BPM | BODY MASS INDEX: 28 KG/M2 | WEIGHT: 158 LBS | SYSTOLIC BLOOD PRESSURE: 158 MMHG | TEMPERATURE: 98 F | RESPIRATION RATE: 18 BRPM | DIASTOLIC BLOOD PRESSURE: 102 MMHG

## 2023-11-05 DIAGNOSIS — F43.21 GRIEF REACTION: Primary | ICD-10-CM

## 2023-11-05 DIAGNOSIS — R03.0 ELEVATED BLOOD PRESSURE READING: ICD-10-CM

## 2023-11-05 LAB
BACTERIA UR QL AUTO: ABNORMAL /HPF
BILIRUB UR QL STRIP: NEGATIVE
CLARITY UR: CLEAR
COLOR UR: YELLOW
GLUCOSE UR STRIP-MCNC: NEGATIVE MG/DL
HGB UR QL STRIP.AUTO: ABNORMAL
HYALINE CASTS UR QL AUTO: ABNORMAL /LPF
KETONES UR QL STRIP: NEGATIVE
LEUKOCYTE ESTERASE UR QL STRIP.AUTO: NEGATIVE
NITRITE UR QL STRIP: NEGATIVE
PH UR STRIP.AUTO: 7 [PH] (ref 4.5–8)
PROT UR QL STRIP: ABNORMAL
RBC # UR STRIP: ABNORMAL /HPF
REF LAB TEST METHOD: ABNORMAL
SP GR UR STRIP: 1.01 (ref 1–1.03)
SQUAMOUS #/AREA URNS HPF: ABNORMAL /HPF
UROBILINOGEN UR QL STRIP: ABNORMAL
WBC # UR STRIP: ABNORMAL /HPF

## 2023-11-05 PROCEDURE — 99283 EMERGENCY DEPT VISIT LOW MDM: CPT

## 2023-11-05 PROCEDURE — 81001 URINALYSIS AUTO W/SCOPE: CPT | Performed by: EMERGENCY MEDICINE

## 2023-11-05 PROCEDURE — 96374 THER/PROPH/DIAG INJ IV PUSH: CPT

## 2023-11-05 PROCEDURE — 25010000002 LORAZEPAM PER 2 MG: Performed by: EMERGENCY MEDICINE

## 2023-11-05 PROCEDURE — 96375 TX/PRO/DX INJ NEW DRUG ADDON: CPT

## 2023-11-05 PROCEDURE — 25010000002 LABETALOL 5 MG/ML SOLUTION: Performed by: EMERGENCY MEDICINE

## 2023-11-05 RX ORDER — LABETALOL HYDROCHLORIDE 5 MG/ML
10 INJECTION, SOLUTION INTRAVENOUS ONCE
Status: COMPLETED | OUTPATIENT
Start: 2023-11-05 | End: 2023-11-05

## 2023-11-05 RX ORDER — LORAZEPAM 2 MG/ML
0.5 INJECTION INTRAMUSCULAR ONCE
Status: COMPLETED | OUTPATIENT
Start: 2023-11-05 | End: 2023-11-05

## 2023-11-05 RX ORDER — LORAZEPAM 0.5 MG/1
TABLET ORAL
Qty: 10 TABLET | Refills: 0 | Status: SHIPPED | OUTPATIENT
Start: 2023-11-05

## 2023-11-05 RX ADMIN — LABETALOL HYDROCHLORIDE 10 MG: 5 INJECTION, SOLUTION INTRAVENOUS at 14:09

## 2023-11-05 RX ADMIN — LORAZEPAM 0.5 MG: 2 INJECTION INTRAMUSCULAR; INTRAVENOUS at 13:01

## 2023-11-05 NOTE — DISCHARGE INSTRUCTIONS
Check your blood pressure couple times a day and write down the time of values that you obtain.  Take the Ativan as needed as prescribed for anxiety.  Follow-up with Dr. Tonny Willis this week.  Turn to the emergency department if you are worse in any way at all.

## 2023-11-05 NOTE — ED PROVIDER NOTES
Subjective   History of Present Illness    Chief complaint: Elevated blood pressure    Location: Home    Quality/Severity: Extremely high    Timing/Onset/Duration: Noted today    Modifying Factors: Precipitated by 's death    Associated Symptoms: No headache.  No fever chills or cough.  No sore throat earache or nasal congestion.  No chest pain or shortness of breath.  No abdominal pain.  No diarrhea or burning when she urinates.  No nausea or vomiting.    Narrative: 89-year-old presents with elevated blood pressure.  Her spouse  this morning.  Took all of her meds today but her blood pressure still elevated.    PCP:Tonny Willis MD      Review of Systems   Constitutional:  Negative for chills.   HENT:  Negative for congestion, ear pain and sore throat.    Respiratory:  Negative for cough and shortness of breath.    Cardiovascular:  Negative for chest pain.   Gastrointestinal:  Negative for abdominal pain, diarrhea, nausea and vomiting.   Musculoskeletal:  Negative for back pain.   Skin:  Negative for rash.   Neurological:  Negative for headaches.         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 2021   • History of blood clots    • Hx of radiation therapy     , left breast ca   • Hyperlipidemia 2021   • Hypertension 2021   • S/P CABG (coronary artery bypass graft) 10/12/2022       Allergies   Allergen Reactions   • Prednisone Unknown - Low Severity       Past Surgical History:   Procedure Laterality Date   • BREAST BIOPSY     • BREAST LUMPECTOMY Left    • CARDIAC SURGERY     • CARPAL TUNNEL RELEASE     • HYSTERECTOMY     • TONSILLECTOMY         Family History   Problem Relation Age of Onset   • Stroke Mother    • Stroke Brother    • Breast cancer Neg Hx        Social History      Socioeconomic History   • Marital status:    • Number of children: 4   Tobacco Use   • Smoking status: Former     Types: Cigarettes   • Smokeless tobacco: Never   Vaping Use   • Vaping Use: Never used   Substance and Sexual Activity   • Alcohol use: No     Comment: caffeine use   • Drug use: No   • Sexual activity: Defer           Objective   Physical Exam  Vitals (The temperature is 98 °F, pulse 73, respirations 18, /130, room air pulse ox 97%.) and nursing note reviewed.   HENT:      Head: Normocephalic and atraumatic.      Right Ear: Tympanic membrane normal.      Left Ear: Tympanic membrane normal.      Nose: Nose normal.      Mouth/Throat:      Mouth: Mucous membranes are moist.   Cardiovascular:      Rate and Rhythm: Normal rate and regular rhythm.      Heart sounds: Murmur heard.      No friction rub. No gallop.   Pulmonary:      Effort: Pulmonary effort is normal.      Breath sounds: Normal breath sounds.   Abdominal:      General: Abdomen is flat. Bowel sounds are normal. There is no distension.      Palpations: Abdomen is soft. There is no mass.      Tenderness: There is no abdominal tenderness. There is no guarding or rebound.      Hernia: No hernia is present.   Musculoskeletal:         General: Normal range of motion.      Cervical back: Normal range of motion and neck supple.   Skin:     General: Skin is warm and dry.      Findings: No rash.   Neurological:      General: No focal deficit present.      Mental Status: She is alert and oriented to person, place, and time.       Procedures           ED Course  ED Course as of 11/05/23 1531   Sun Nov 05, 2023   1428 The urinalysis shows trace blood, 30 mg deciliter protein, trace bacteria otherwise unremarkable. [RC]      ED Course User Index  [RC] Gonzalez Lanza MD      15:22 EST, 11/05/23: The repeat blood pressure is 122/81.  Patient has no new complaints.  The patient's vital signs otherwise are stable.  The patient is sleeping but  arousable.  She has no other complaints.    15:25 EST, 11/05/23:  Patient's diagnosis of acute grief reaction and elevated blood pressure was discussed with the family.  The family has requested a short course of benzodiazepines as needed for anxiety.  A prescription for some Ativan.  The family should recheck blood pressure couple times a day and write down the value in the time that they get these results and take them with the patient to visit her primary care provider this week.  Patient should follow-up with Dr. Tonny Willis this week.  Return to the emergency department if she is worse in any way at all.                                       Medical Decision Making  Risk  Prescription drug management.        Final diagnoses:   Grief reaction   Elevated blood pressure reading       ED Disposition  ED Disposition       None            No follow-up provider specified.       Medication List      No changes were made to your prescriptions during this visit.            Gonzalez Lanza MD  11/05/23 9152

## 2023-11-05 NOTE — CONSULTS
request for patient treated in ER following the death of her . I met with the patient and multiple family members for spiritual care, hospitality and prayer.

## 2024-01-09 ENCOUNTER — OFFICE VISIT (OUTPATIENT)
Dept: CARDIOLOGY | Facility: CLINIC | Age: 89
End: 2024-01-09
Payer: MEDICARE

## 2024-01-09 VITALS
HEART RATE: 71 BPM | DIASTOLIC BLOOD PRESSURE: 90 MMHG | SYSTOLIC BLOOD PRESSURE: 162 MMHG | HEIGHT: 62 IN | OXYGEN SATURATION: 92 % | BODY MASS INDEX: 28.9 KG/M2

## 2024-01-09 DIAGNOSIS — Z95.1 S/P CABG (CORONARY ARTERY BYPASS GRAFT): ICD-10-CM

## 2024-01-09 DIAGNOSIS — I25.118 CORONARY ARTERY DISEASE OF NATIVE ARTERY OF NATIVE HEART WITH STABLE ANGINA PECTORIS: Primary | ICD-10-CM

## 2024-01-09 DIAGNOSIS — I10 PRIMARY HYPERTENSION: ICD-10-CM

## 2024-01-09 PROCEDURE — 99214 OFFICE O/P EST MOD 30 MIN: CPT | Performed by: INTERNAL MEDICINE

## 2024-01-09 RX ORDER — CARVEDILOL 12.5 MG/1
12.5 TABLET ORAL 2 TIMES DAILY
Qty: 180 TABLET | Refills: 3 | Status: ON HOLD | OUTPATIENT
Start: 2024-01-09

## 2024-01-09 RX ORDER — CLOPIDOGREL BISULFATE 75 MG/1
75 TABLET ORAL DAILY
Status: ON HOLD | COMMUNITY
Start: 2023-09-05

## 2024-01-09 NOTE — PROGRESS NOTES
Subjective:     Encounter Date:01/09/24      Patient ID: Antonette King is a 89 y.o. female.    Chief Complaint:  History of Present Illness    Dear Dulce,    I had the pleasure of seeing this patient in the office today.  She comes in for follow-up of her cardiac status.    Has been about 6 months since her last visit.  She actually comes in just a little bit early but that is because she is can be leaving to leave with her son in [place] over the winter.    She denies any chest pain, pressure, tightness, squeezing, or heartburn.  She has not experienced any feeling of palpitations, tachycardia or heart racing and no presyncope or syncope.  There have not been any problems with dizziness or lightheadedness.  There have not been any orthopnea or PND, and no problems with lower extremity edema.  She denies any shortness of breath at rest or with activity and has not had any wheezing.  She has not had any problems with unexplained nausea or vomiting. She has continued to perform daily activities of living without any specific problem or change in the level of activity.  She has not been recently hospitalized for any reason.    Patient has a history of CAD, status post CABG in 2000 at [facility].  I apparently saw her at the time but there are no records available and I do not know the details of her bypass.  She does not recall who did the surgery.      She was hospitalized at [facility] 7/2023.  She came initially with some elevated blood pressure.  She also complained of some low back pain and chest pain.  She was seen by Dr. Chen.  She had an echocardiogram performed, reviewed in detail below.  She is felt to have acute on chronic diastolic heart failure in part related to recent steroid use.  When they sent her home they sent her home on Imdur 30 mg twice daily; she had been on 30 mg once daily as well.      The following portions of the patient's history were reviewed and updated as appropriate:  "allergies, current medications, past family history, past medical history, past social history, past surgical history and problem list.    Procedures       Objective:     Vitals:    01/09/24 1253   BP: 162/90   BP Location: Left arm   Patient Position: Sitting   Cuff Size: Adult   Pulse: 71   SpO2: 92%   Height: 157.5 cm (62\")     Body mass index is 28.9 kg/m².       General Appearance:    Alert, cooperative, in no acute distress   Head:    Normocephalic, without obvious abnormality   Eyes:            Lids and lashes normal, conjunctivae and sclerae normal, no icterus, no pallor, corneas clear   Ears:    Ears appear intact with no abnormalities noted   Throat:   No oral lesions, oral mucosa moist   Neck:   No adenopathy, supple, trachea midline, no thyromegaly, no carotid bruit, no JVD   Back:     No kyphosis present, no erythema or scars, no tenderness to palpation    Lungs:     Clear to auscultation,respirations regular, even and unlabored    Heart:    Regular rhythm and normal rate, normal S1 and S2, no murmur, no gallop, no rub, no click   Chest Wall:    No abnormalities observed   Abdomen:     Normal bowel sounds, no masses, no organomegaly, soft        non-tender, non-distended, no guarding   Extremities:   Moves all extremities well, no edema, no cyanosis, no redness   Pulses:  Bilateral carotids brisk   Skin:  Psychiatric:   No bleeding or rash    Alert        Data and records reviewed:     Lab Results   Component Value Date    GLUCOSE 114 (H) 08/02/2023    BUN 35 (H) 08/02/2023    CREATININE 1.45 (H) 08/02/2023    EGFRIFNONA 63 04/16/2021    BCR 24.1 08/02/2023    K 4.2 08/02/2023    CO2 22.6 08/02/2023    CALCIUM 8.8 08/02/2023    ALBUMIN 3.4 (L) 08/01/2023    AST 30 07/31/2023    ALT 42 (H) 07/31/2023     Lab Results   Component Value Date    CHOL 221 (H) 07/31/2023     Lab Results   Component Value Date    TRIG 115 07/31/2023     Lab Results   Component Value Date    HDL 77 (H) 07/31/2023     Lab Results "   Component Value Date     (H) 07/31/2023     Lab Results   Component Value Date    VLDL 20 07/31/2023     Lab Results   Component Value Date    LDLHDL 1.57 07/31/2023     CBC          7/31/2023    13:01 8/1/2023    05:19   CBC   WBC 18.18  15.85    RBC 4.19  3.61    Hemoglobin 12.3  10.7    Hematocrit 40.1  33.5    MCV 95.7  92.8    MCH 29.4  29.6    MCHC 30.7  31.9    RDW 14.7  14.9    Platelets 287  241    No radiology results for the last 90 days.  Results for orders placed during the hospital encounter of 07/31/23    Adult Transthoracic Echo Complete W/ Cont if Necessary Per Protocol    Interpretation Summary    Left ventricular systolic function is normal. Left ventricular ejection fraction appears to be 66 - 70%.    Left ventricular wall thickness is consistent with moderate concentric hypertrophy.    Left ventricular diastolic function is consistent with (grade II w/high LAP) pseudonormalization.    Normal right ventricular cavity size and systolic function noted.    : The left atrial cavity is mildly dilated.    Moderate aortic valve stenosis is present. Aortic valve maximum pressure gradient is 35.3 mmHg. Aortic valve mean pressure gradient is 21.9 mmHg    Insufficient TR velocity profile to estimate the right ventricular systolic pressure.    : There is no evidence of pericardial effusion    Stress test October 2022  Interpretation Summary         Myocardial perfusion imaging indicates a medium-sized, moderately severe area of ischemia located in the inferior wall and lateral wall.    Left ventricular ejection fraction is hyperdynamic (Calculated EF > 70%). .           Assessment:          Diagnosis Plan   1. Coronary artery disease of native artery of native heart with stable angina pectoris  carvedilol (COREG) 12.5 MG tablet      2. S/P CABG (coronary artery bypass graft)  carvedilol (COREG) 12.5 MG tablet      3. Primary hypertension  carvedilol (COREG) 12.5 MG tablet               Plan:        1.  Abnormal stress test, inferolateral ischemia, discussed cardiac catheterization versus augmentation of medical therapy, we started isosorbide mononitrate 30 mg once daily and since then she has been doing well with no angina pectoris.  This point we will continue that  2.  Coronary artery disease- status post CABG 2000, no details of the surgery.  Has remained on dual antiplatelet therapy ever since her surgery, now that she is doing well with no angina pectoris we will stop the clopidogrel and continue the aspirin.  Agree with Dr. Chen that given her age we prefer to have her on a single antiplatelet agent  3.  Hypertension, was in the emergency room with markedly elevated blood pressure, still incompletely controlled, I will switch her from metoprolol to tartrate 50 mg twice daily to carvedilol 12.5 mg twice daily to help with her blood pressure control.  4.  Mixed hyperlipidemia on pravastatin, AST, ALT reviewed    Thank you very much for allowing us to participate in the care of this pleasant patient.  Please don't hesitate to call if I can be of assistance in any way.        Current Outpatient Medications:     allopurinol (ZYLOPRIM) 300 MG tablet, Take 1 tablet by mouth Daily., Disp: , Rfl:     ascorbic acid (VITAMIN C) 500 MG tablet, Take 1 tablet by mouth Daily., Disp: , Rfl:     aspirin 81 MG chewable tablet, Chew 1 tablet Daily., Disp: , Rfl:     atorvastatin (LIPITOR) 40 MG tablet, Take 1 tablet by mouth Every Night., Disp: 30 tablet, Rfl: 0    busPIRone (BUSPAR) 10 MG tablet, Take 1 tablet by mouth 3 (Three) Times a Day., Disp: , Rfl:     clopidogrel (PLAVIX) 75 MG tablet, Take 1 tablet by mouth Daily., Disp: , Rfl:     hydrALAZINE (APRESOLINE) 25 MG tablet, Take 1 tablet by mouth 2 (Two) Times a Day., Disp: 90 tablet, Rfl: 0    isosorbide mononitrate (IMDUR) 30 MG 24 hr tablet, Take 1 tablet by mouth Daily., Disp: 90 tablet, Rfl: 3    LORazepam (ATIVAN) 0.5 MG tablet, Take half to 1 tab po bid prn  anxiety, Disp: 10 tablet, Rfl: 0    losartan-hydrochlorothiazide (HYZAAR) 100-12.5 MG per tablet, Take 1 tablet by mouth Daily., Disp: , Rfl:     multivitamin with minerals tablet tablet, Take 1 tablet by mouth Daily., Disp: , Rfl:     venlafaxine XR (EFFEXOR-XR) 75 MG 24 hr capsule, Take 1 capsule by mouth Daily., Disp: , Rfl:     vitamin D3 125 MCG (5000 UT) capsule capsule, Take 1 capsule by mouth Daily., Disp: , Rfl:     carvedilol (COREG) 12.5 MG tablet, Take 1 tablet by mouth 2 (Two) Times a Day., Disp: 180 tablet, Rfl: 3         Return in about 6 months (around 7/9/2024).

## 2024-01-14 ENCOUNTER — APPOINTMENT (OUTPATIENT)
Dept: CARDIOLOGY | Facility: HOSPITAL | Age: 89
End: 2024-01-14
Payer: MEDICARE

## 2024-01-14 ENCOUNTER — APPOINTMENT (OUTPATIENT)
Dept: CT IMAGING | Facility: HOSPITAL | Age: 89
End: 2024-01-14
Payer: MEDICARE

## 2024-01-14 ENCOUNTER — APPOINTMENT (OUTPATIENT)
Dept: MRI IMAGING | Facility: HOSPITAL | Age: 89
End: 2024-01-14
Payer: MEDICARE

## 2024-01-14 ENCOUNTER — HOSPITAL ENCOUNTER (INPATIENT)
Facility: HOSPITAL | Age: 89
LOS: 5 days | Discharge: HOME | End: 2024-01-19
Attending: EMERGENCY MEDICINE | Admitting: HOSPITALIST
Payer: MEDICARE

## 2024-01-14 DIAGNOSIS — I67.1 ANEURYSM OF CAVERNOUS PORTION OF LEFT INTERNAL CAROTID ARTERY: ICD-10-CM

## 2024-01-14 DIAGNOSIS — N18.9 CHRONIC KIDNEY DISEASE, UNSPECIFIED CKD STAGE: ICD-10-CM

## 2024-01-14 DIAGNOSIS — G30.1 DEMENTIA OF THE ALZHEIMER'S TYPE WITH LATE ONSET WITHOUT BEHAVIORAL DISTURBANCE: ICD-10-CM

## 2024-01-14 DIAGNOSIS — R79.89 ELEVATED TROPONIN: ICD-10-CM

## 2024-01-14 DIAGNOSIS — F02.80 DEMENTIA OF THE ALZHEIMER'S TYPE WITH LATE ONSET WITHOUT BEHAVIORAL DISTURBANCE: ICD-10-CM

## 2024-01-14 DIAGNOSIS — I67.1 CEREBRAL ANEURYSM, NONRUPTURED: ICD-10-CM

## 2024-01-14 DIAGNOSIS — G45.9 TIA (TRANSIENT ISCHEMIC ATTACK): Primary | ICD-10-CM

## 2024-01-14 DIAGNOSIS — I25.118 CORONARY ARTERY DISEASE OF NATIVE ARTERY OF NATIVE HEART WITH STABLE ANGINA PECTORIS: ICD-10-CM

## 2024-01-14 LAB
ABO GROUP BLD: NORMAL
ALBUMIN SERPL-MCNC: 2.9 G/DL (ref 3.5–5.2)
ALBUMIN/GLOB SERPL: 1.1 G/DL
ALP SERPL-CCNC: 60 U/L (ref 39–117)
ALT SERPL W P-5'-P-CCNC: 7 U/L (ref 1–33)
ANION GAP SERPL CALCULATED.3IONS-SCNC: 12.5 MMOL/L (ref 5–15)
ANION GAP SERPL CALCULATED.3IONS-SCNC: 18 MMOL/L (ref 5–15)
APTT PPP: 29.7 SECONDS (ref 22.7–35.4)
AST SERPL-CCNC: 11 U/L (ref 1–32)
BASOPHILS # BLD AUTO: 0.04 10*3/MM3 (ref 0–0.2)
BASOPHILS # BLD AUTO: 0.06 10*3/MM3 (ref 0–0.2)
BASOPHILS NFR BLD AUTO: 0.4 % (ref 0–1.5)
BASOPHILS NFR BLD AUTO: 0.6 % (ref 0–1.5)
BH CV XLRA MEAS LEFT DIST CCA EDV: -12.3 CM/SEC
BH CV XLRA MEAS LEFT DIST CCA PSV: -61 CM/SEC
BH CV XLRA MEAS LEFT DIST ICA EDV: -24.1 CM/SEC
BH CV XLRA MEAS LEFT DIST ICA PSV: -63.7 CM/SEC
BH CV XLRA MEAS LEFT ICA/CCA RATIO: 1.52
BH CV XLRA MEAS LEFT MID ICA EDV: -18 CM/SEC
BH CV XLRA MEAS LEFT MID ICA PSV: -54.9 CM/SEC
BH CV XLRA MEAS LEFT PROX CCA EDV: -16.9 CM/SEC
BH CV XLRA MEAS LEFT PROX CCA PSV: -58 CM/SEC
BH CV XLRA MEAS LEFT PROX ECA EDV: 11.4 CM/SEC
BH CV XLRA MEAS LEFT PROX ECA PSV: 46.3 CM/SEC
BH CV XLRA MEAS LEFT PROX ICA EDV: 35.3 CM/SEC
BH CV XLRA MEAS LEFT PROX ICA PSV: 92.5 CM/SEC
BH CV XLRA MEAS LEFT PROX SCLA PSV: -66.3 CM/SEC
BH CV XLRA MEAS LEFT VERTEBRAL A EDV: 8.2 CM/SEC
BH CV XLRA MEAS LEFT VERTEBRAL A PSV: 30.1 CM/SEC
BH CV XLRA MEAS RIGHT DIST CCA EDV: -17.2 CM/SEC
BH CV XLRA MEAS RIGHT DIST CCA PSV: -52.6 CM/SEC
BH CV XLRA MEAS RIGHT DIST ICA EDV: 19.7 CM/SEC
BH CV XLRA MEAS RIGHT DIST ICA PSV: 56 CM/SEC
BH CV XLRA MEAS RIGHT ICA/CCA RATIO: 1.35
BH CV XLRA MEAS RIGHT MID ICA EDV: -23.1 CM/SEC
BH CV XLRA MEAS RIGHT MID ICA PSV: -60 CM/SEC
BH CV XLRA MEAS RIGHT PROX CCA EDV: -14.3 CM/SEC
BH CV XLRA MEAS RIGHT PROX CCA PSV: -59 CM/SEC
BH CV XLRA MEAS RIGHT PROX ECA EDV: -9.8 CM/SEC
BH CV XLRA MEAS RIGHT PROX ECA PSV: -69.3 CM/SEC
BH CV XLRA MEAS RIGHT PROX ICA EDV: -19.7 CM/SEC
BH CV XLRA MEAS RIGHT PROX ICA PSV: -70.8 CM/SEC
BH CV XLRA MEAS RIGHT PROX SCLA PSV: 61.1 CM/SEC
BH CV XLRA MEAS RIGHT VERTEBRAL A EDV: -10.9 CM/SEC
BH CV XLRA MEAS RIGHT VERTEBRAL A PSV: -32.2 CM/SEC
BILIRUB SERPL-MCNC: 0.2 MG/DL (ref 0–1.2)
BLD GP AB SCN SERPL QL: NEGATIVE
BUN SERPL-MCNC: 27 MG/DL (ref 8–23)
BUN SERPL-MCNC: 28 MG/DL (ref 8–23)
BUN/CREAT SERPL: 17.1 (ref 7–25)
BUN/CREAT SERPL: 17.6 (ref 7–25)
CALCIUM SPEC-SCNC: 8.4 MG/DL (ref 8.6–10.5)
CALCIUM SPEC-SCNC: 9.9 MG/DL (ref 8.6–10.5)
CHLORIDE SERPL-SCNC: 105 MMOL/L (ref 98–107)
CHLORIDE SERPL-SCNC: 98 MMOL/L (ref 98–107)
CHOLEST SERPL-MCNC: 204 MG/DL (ref 0–200)
CO2 SERPL-SCNC: 20 MMOL/L (ref 22–29)
CO2 SERPL-SCNC: 24.5 MMOL/L (ref 22–29)
CREAT SERPL-MCNC: 1.53 MG/DL (ref 0.57–1)
CREAT SERPL-MCNC: 1.64 MG/DL (ref 0.57–1)
DEPRECATED RDW RBC AUTO: 50.1 FL (ref 37–54)
DEPRECATED RDW RBC AUTO: 50.4 FL (ref 37–54)
EGFRCR SERPLBLD CKD-EPI 2021: 29.8 ML/MIN/1.73
EGFRCR SERPLBLD CKD-EPI 2021: 32.4 ML/MIN/1.73
EOSINOPHIL # BLD AUTO: 0.07 10*3/MM3 (ref 0–0.4)
EOSINOPHIL # BLD AUTO: 0.25 10*3/MM3 (ref 0–0.4)
EOSINOPHIL NFR BLD AUTO: 0.7 % (ref 0.3–6.2)
EOSINOPHIL NFR BLD AUTO: 2.3 % (ref 0.3–6.2)
ERYTHROCYTE [DISTWIDTH] IN BLOOD BY AUTOMATED COUNT: 15.4 % (ref 12.3–15.4)
ERYTHROCYTE [DISTWIDTH] IN BLOOD BY AUTOMATED COUNT: 15.7 % (ref 12.3–15.4)
GEN 5 2HR TROPONIN T REFLEX: 69 NG/L
GLOBULIN UR ELPH-MCNC: 2.7 GM/DL
GLUCOSE BLDC GLUCOMTR-MCNC: 106 MG/DL (ref 70–130)
GLUCOSE BLDC GLUCOMTR-MCNC: 123 MG/DL (ref 70–130)
GLUCOSE BLDC GLUCOMTR-MCNC: 142 MG/DL (ref 70–130)
GLUCOSE SERPL-MCNC: 108 MG/DL (ref 65–99)
GLUCOSE SERPL-MCNC: 94 MG/DL (ref 65–99)
HBA1C MFR BLD: 5.5 % (ref 4.8–5.6)
HCT VFR BLD AUTO: 29.5 % (ref 34–46.6)
HCT VFR BLD AUTO: 32.8 % (ref 34–46.6)
HDLC SERPL-MCNC: 58 MG/DL (ref 40–60)
HGB BLD-MCNC: 10.5 G/DL (ref 12–15.9)
HGB BLD-MCNC: 9.6 G/DL (ref 12–15.9)
IMM GRANULOCYTES # BLD AUTO: 0.02 10*3/MM3 (ref 0–0.05)
IMM GRANULOCYTES # BLD AUTO: 0.04 10*3/MM3 (ref 0–0.05)
IMM GRANULOCYTES NFR BLD AUTO: 0.2 % (ref 0–0.5)
IMM GRANULOCYTES NFR BLD AUTO: 0.4 % (ref 0–0.5)
INR PPP: 1.12 (ref 0.9–1.1)
INR PPP: 1.23 (ref 0.9–1.1)
LDLC SERPL CALC-MCNC: 129 MG/DL (ref 0–100)
LDLC/HDLC SERPL: 2.18 {RATIO}
LEFT ARM BP: NORMAL MMHG
LYMPHOCYTES # BLD AUTO: 1.94 10*3/MM3 (ref 0.7–3.1)
LYMPHOCYTES # BLD AUTO: 2.1 10*3/MM3 (ref 0.7–3.1)
LYMPHOCYTES NFR BLD AUTO: 18.5 % (ref 19.6–45.3)
LYMPHOCYTES NFR BLD AUTO: 19.2 % (ref 19.6–45.3)
MCH RBC QN AUTO: 28.2 PG (ref 26.6–33)
MCH RBC QN AUTO: 28.7 PG (ref 26.6–33)
MCHC RBC AUTO-ENTMCNC: 32 G/DL (ref 31.5–35.7)
MCHC RBC AUTO-ENTMCNC: 32.5 G/DL (ref 31.5–35.7)
MCV RBC AUTO: 86.8 FL (ref 79–97)
MCV RBC AUTO: 89.6 FL (ref 79–97)
MONOCYTES # BLD AUTO: 0.57 10*3/MM3 (ref 0.1–0.9)
MONOCYTES # BLD AUTO: 0.68 10*3/MM3 (ref 0.1–0.9)
MONOCYTES NFR BLD AUTO: 5.2 % (ref 5–12)
MONOCYTES NFR BLD AUTO: 6.5 % (ref 5–12)
NEUTROPHILS NFR BLD AUTO: 7.68 10*3/MM3 (ref 1.7–7)
NEUTROPHILS NFR BLD AUTO: 7.93 10*3/MM3 (ref 1.7–7)
NEUTROPHILS NFR BLD AUTO: 72.7 % (ref 42.7–76)
NEUTROPHILS NFR BLD AUTO: 73.3 % (ref 42.7–76)
NRBC BLD AUTO-RTO: 0 /100 WBC (ref 0–0.2)
NRBC BLD AUTO-RTO: 0 /100 WBC (ref 0–0.2)
PLATELET # BLD AUTO: 278 10*3/MM3 (ref 140–450)
PLATELET # BLD AUTO: 292 10*3/MM3 (ref 140–450)
PMV BLD AUTO: 10.6 FL (ref 6–12)
PMV BLD AUTO: 9.8 FL (ref 6–12)
POTASSIUM SERPL-SCNC: 3.6 MMOL/L (ref 3.5–5.2)
POTASSIUM SERPL-SCNC: 4.6 MMOL/L (ref 3.5–5.2)
PROT SERPL-MCNC: 5.6 G/DL (ref 6–8.5)
PROTHROMBIN TIME: 14.6 SECONDS (ref 11.7–14.2)
PROTHROMBIN TIME: 15.6 SECONDS (ref 11.7–14.2)
QT INTERVAL: 531 MS
QTC INTERVAL: 540 MS
RBC # BLD AUTO: 3.4 10*6/MM3 (ref 3.77–5.28)
RBC # BLD AUTO: 3.66 10*6/MM3 (ref 3.77–5.28)
RH BLD: POSITIVE
RIGHT ARM BP: NORMAL MMHG
SODIUM SERPL-SCNC: 136 MMOL/L (ref 136–145)
SODIUM SERPL-SCNC: 142 MMOL/L (ref 136–145)
T&S EXPIRATION DATE: NORMAL
TRIGL SERPL-MCNC: 97 MG/DL (ref 0–150)
TROPONIN T DELTA: -4 NG/L
TROPONIN T SERPL HS-MCNC: 73 NG/L
VLDLC SERPL-MCNC: 17 MG/DL (ref 5–40)
WBC NRBC COR # BLD AUTO: 10.47 10*3/MM3 (ref 3.4–10.8)
WBC NRBC COR # BLD AUTO: 10.91 10*3/MM3 (ref 3.4–10.8)

## 2024-01-14 PROCEDURE — 70450 CT HEAD/BRAIN W/O DYE: CPT

## 2024-01-14 PROCEDURE — 97530 THERAPEUTIC ACTIVITIES: CPT

## 2024-01-14 PROCEDURE — 86850 RBC ANTIBODY SCREEN: CPT | Performed by: EMERGENCY MEDICINE

## 2024-01-14 PROCEDURE — 25810000003 SODIUM CHLORIDE 0.9 % SOLUTION: Performed by: NURSE PRACTITIONER

## 2024-01-14 PROCEDURE — 93005 ELECTROCARDIOGRAM TRACING: CPT | Performed by: EMERGENCY MEDICINE

## 2024-01-14 PROCEDURE — 85610 PROTHROMBIN TIME: CPT | Performed by: EMERGENCY MEDICINE

## 2024-01-14 PROCEDURE — 97165 OT EVAL LOW COMPLEX 30 MIN: CPT

## 2024-01-14 PROCEDURE — 80061 LIPID PANEL: CPT | Performed by: NURSE PRACTITIONER

## 2024-01-14 PROCEDURE — 86901 BLOOD TYPING SEROLOGIC RH(D): CPT | Performed by: EMERGENCY MEDICINE

## 2024-01-14 PROCEDURE — 99221 1ST HOSP IP/OBS SF/LOW 40: CPT | Performed by: STUDENT IN AN ORGANIZED HEALTH CARE EDUCATION/TRAINING PROGRAM

## 2024-01-14 PROCEDURE — 85025 COMPLETE CBC W/AUTO DIFF WBC: CPT | Performed by: NURSE PRACTITIONER

## 2024-01-14 PROCEDURE — 86900 BLOOD TYPING SEROLOGIC ABO: CPT | Performed by: EMERGENCY MEDICINE

## 2024-01-14 PROCEDURE — 93880 EXTRACRANIAL BILAT STUDY: CPT

## 2024-01-14 PROCEDURE — 70551 MRI BRAIN STEM W/O DYE: CPT

## 2024-01-14 PROCEDURE — 82948 REAGENT STRIP/BLOOD GLUCOSE: CPT

## 2024-01-14 PROCEDURE — 99285 EMERGENCY DEPT VISIT HI MDM: CPT

## 2024-01-14 PROCEDURE — 36415 COLL VENOUS BLD VENIPUNCTURE: CPT | Performed by: NURSE PRACTITIONER

## 2024-01-14 PROCEDURE — 93010 ELECTROCARDIOGRAM REPORT: CPT | Performed by: INTERNAL MEDICINE

## 2024-01-14 PROCEDURE — 85610 PROTHROMBIN TIME: CPT | Performed by: NURSE PRACTITIONER

## 2024-01-14 PROCEDURE — 85730 THROMBOPLASTIN TIME PARTIAL: CPT | Performed by: EMERGENCY MEDICINE

## 2024-01-14 PROCEDURE — 36415 COLL VENOUS BLD VENIPUNCTURE: CPT

## 2024-01-14 PROCEDURE — 80053 COMPREHEN METABOLIC PANEL: CPT | Performed by: EMERGENCY MEDICINE

## 2024-01-14 PROCEDURE — 97162 PT EVAL MOD COMPLEX 30 MIN: CPT

## 2024-01-14 PROCEDURE — 97535 SELF CARE MNGMENT TRAINING: CPT

## 2024-01-14 PROCEDURE — 83036 HEMOGLOBIN GLYCOSYLATED A1C: CPT | Performed by: NURSE PRACTITIONER

## 2024-01-14 PROCEDURE — 85025 COMPLETE CBC W/AUTO DIFF WBC: CPT | Performed by: EMERGENCY MEDICINE

## 2024-01-14 PROCEDURE — 84484 ASSAY OF TROPONIN QUANT: CPT | Performed by: EMERGENCY MEDICINE

## 2024-01-14 RX ORDER — ASPIRIN 325 MG
325 TABLET ORAL DAILY
Status: DISCONTINUED | OUTPATIENT
Start: 2024-01-14 | End: 2024-01-15

## 2024-01-14 RX ORDER — BISACODYL 5 MG/1
5 TABLET, DELAYED RELEASE ORAL DAILY PRN
Status: DISCONTINUED | OUTPATIENT
Start: 2024-01-14 | End: 2024-01-19 | Stop reason: HOSPADM

## 2024-01-14 RX ORDER — SODIUM CHLORIDE 0.9 % (FLUSH) 0.9 %
10 SYRINGE (ML) INJECTION AS NEEDED
Status: DISCONTINUED | OUTPATIENT
Start: 2024-01-14 | End: 2024-01-19 | Stop reason: HOSPADM

## 2024-01-14 RX ORDER — SODIUM CHLORIDE 9 MG/ML
40 INJECTION, SOLUTION INTRAVENOUS AS NEEDED
Status: DISCONTINUED | OUTPATIENT
Start: 2024-01-14 | End: 2024-01-19 | Stop reason: HOSPADM

## 2024-01-14 RX ORDER — ALLOPURINOL 300 MG/1
300 TABLET ORAL DAILY
Status: DISCONTINUED | OUTPATIENT
Start: 2024-01-14 | End: 2024-01-19 | Stop reason: HOSPADM

## 2024-01-14 RX ORDER — VENLAFAXINE HYDROCHLORIDE 75 MG/1
75 CAPSULE, EXTENDED RELEASE ORAL DAILY
Status: DISCONTINUED | OUTPATIENT
Start: 2024-01-14 | End: 2024-01-19 | Stop reason: HOSPADM

## 2024-01-14 RX ORDER — ONDANSETRON 2 MG/ML
4 INJECTION INTRAMUSCULAR; INTRAVENOUS EVERY 6 HOURS PRN
Status: DISCONTINUED | OUTPATIENT
Start: 2024-01-14 | End: 2024-01-19 | Stop reason: HOSPADM

## 2024-01-14 RX ORDER — CARVEDILOL 12.5 MG/1
12.5 TABLET ORAL 2 TIMES DAILY
Status: DISCONTINUED | OUTPATIENT
Start: 2024-01-14 | End: 2024-01-15

## 2024-01-14 RX ORDER — ATORVASTATIN CALCIUM 80 MG/1
80 TABLET, FILM COATED ORAL NIGHTLY
Status: DISCONTINUED | OUTPATIENT
Start: 2024-01-14 | End: 2024-01-19 | Stop reason: HOSPADM

## 2024-01-14 RX ORDER — ACETAMINOPHEN 650 MG/1
650 SUPPOSITORY RECTAL EVERY 4 HOURS PRN
Status: DISCONTINUED | OUTPATIENT
Start: 2024-01-14 | End: 2024-01-19 | Stop reason: HOSPADM

## 2024-01-14 RX ORDER — ASPIRIN 300 MG/1
300 SUPPOSITORY RECTAL DAILY
Status: DISCONTINUED | OUTPATIENT
Start: 2024-01-14 | End: 2024-01-15

## 2024-01-14 RX ORDER — BUSPIRONE HYDROCHLORIDE 10 MG/1
10 TABLET ORAL EVERY 12 HOURS SCHEDULED
Status: DISCONTINUED | OUTPATIENT
Start: 2024-01-14 | End: 2024-01-19 | Stop reason: HOSPADM

## 2024-01-14 RX ORDER — MULTIPLE VITAMINS W/ MINERALS TAB 9MG-400MCG
1 TAB ORAL DAILY
Status: DISCONTINUED | OUTPATIENT
Start: 2024-01-14 | End: 2024-01-19 | Stop reason: HOSPADM

## 2024-01-14 RX ORDER — ACETAMINOPHEN 160 MG/5ML
650 SOLUTION ORAL EVERY 4 HOURS PRN
Status: DISCONTINUED | OUTPATIENT
Start: 2024-01-14 | End: 2024-01-19 | Stop reason: HOSPADM

## 2024-01-14 RX ORDER — BISACODYL 10 MG
10 SUPPOSITORY, RECTAL RECTAL DAILY PRN
Status: DISCONTINUED | OUTPATIENT
Start: 2024-01-14 | End: 2024-01-19 | Stop reason: HOSPADM

## 2024-01-14 RX ORDER — BUSPIRONE HYDROCHLORIDE 10 MG/1
10 TABLET ORAL 3 TIMES DAILY
Status: DISCONTINUED | OUTPATIENT
Start: 2024-01-14 | End: 2024-01-14

## 2024-01-14 RX ORDER — LOSARTAN POTASSIUM 100 MG/1
100 TABLET ORAL DAILY
COMMUNITY
End: 2024-01-19 | Stop reason: HOSPADM

## 2024-01-14 RX ORDER — LORAZEPAM 0.5 MG/1
0.5 TABLET ORAL 2 TIMES DAILY PRN
Status: DISCONTINUED | OUTPATIENT
Start: 2024-01-14 | End: 2024-01-19 | Stop reason: HOSPADM

## 2024-01-14 RX ORDER — SODIUM CHLORIDE 0.9 % (FLUSH) 0.9 %
10 SYRINGE (ML) INJECTION EVERY 12 HOURS SCHEDULED
Status: DISCONTINUED | OUTPATIENT
Start: 2024-01-14 | End: 2024-01-19 | Stop reason: HOSPADM

## 2024-01-14 RX ORDER — SODIUM CHLORIDE 9 MG/ML
100 INJECTION, SOLUTION INTRAVENOUS CONTINUOUS
Status: DISCONTINUED | OUTPATIENT
Start: 2024-01-14 | End: 2024-01-15

## 2024-01-14 RX ORDER — ASCORBIC ACID 500 MG
500 TABLET ORAL DAILY
Status: DISCONTINUED | OUTPATIENT
Start: 2024-01-14 | End: 2024-01-19 | Stop reason: HOSPADM

## 2024-01-14 RX ORDER — POLYETHYLENE GLYCOL 3350 17 G/17G
17 POWDER, FOR SOLUTION ORAL DAILY PRN
Status: DISCONTINUED | OUTPATIENT
Start: 2024-01-14 | End: 2024-01-19 | Stop reason: HOSPADM

## 2024-01-14 RX ORDER — NITROGLYCERIN 0.4 MG/1
0.4 TABLET SUBLINGUAL
Status: DISCONTINUED | OUTPATIENT
Start: 2024-01-14 | End: 2024-01-19 | Stop reason: HOSPADM

## 2024-01-14 RX ORDER — CARVEDILOL 12.5 MG/1
12.5 TABLET ORAL ONCE
Status: COMPLETED | OUTPATIENT
Start: 2024-01-14 | End: 2024-01-14

## 2024-01-14 RX ORDER — AMOXICILLIN 250 MG
2 CAPSULE ORAL 2 TIMES DAILY
Status: DISCONTINUED | OUTPATIENT
Start: 2024-01-14 | End: 2024-01-19 | Stop reason: HOSPADM

## 2024-01-14 RX ORDER — ACETAMINOPHEN 325 MG/1
650 TABLET ORAL EVERY 4 HOURS PRN
Status: DISCONTINUED | OUTPATIENT
Start: 2024-01-14 | End: 2024-01-19 | Stop reason: HOSPADM

## 2024-01-14 RX ADMIN — VENLAFAXINE HYDROCHLORIDE 75 MG: 75 CAPSULE, EXTENDED RELEASE ORAL at 18:00

## 2024-01-14 RX ADMIN — Medication 10 ML: at 21:31

## 2024-01-14 RX ADMIN — OXYCODONE HYDROCHLORIDE AND ACETAMINOPHEN 500 MG: 500 TABLET ORAL at 18:00

## 2024-01-14 RX ADMIN — DOCUSATE SODIUM 50MG AND SENNOSIDES 8.6MG 2 TABLET: 8.6; 5 TABLET, FILM COATED ORAL at 21:30

## 2024-01-14 RX ADMIN — CARVEDILOL 12.5 MG: 12.5 TABLET, FILM COATED ORAL at 07:11

## 2024-01-14 RX ADMIN — Medication 1 TABLET: at 14:53

## 2024-01-14 RX ADMIN — ALLOPURINOL 300 MG: 300 TABLET ORAL at 14:53

## 2024-01-14 RX ADMIN — BUSPIRONE HYDROCHLORIDE 10 MG: 10 TABLET ORAL at 21:30

## 2024-01-14 RX ADMIN — DOCUSATE SODIUM 50MG AND SENNOSIDES 8.6MG 2 TABLET: 8.6; 5 TABLET, FILM COATED ORAL at 09:04

## 2024-01-14 RX ADMIN — ATORVASTATIN CALCIUM 80 MG: 80 TABLET, FILM COATED ORAL at 21:30

## 2024-01-14 RX ADMIN — SODIUM CHLORIDE 100 ML/HR: 9 INJECTION, SOLUTION INTRAVENOUS at 07:11

## 2024-01-14 RX ADMIN — ASPIRIN 325 MG: 325 TABLET ORAL at 09:04

## 2024-01-14 RX ADMIN — BUSPIRONE HYDROCHLORIDE 10 MG: 10 TABLET ORAL at 14:53

## 2024-01-14 RX ADMIN — CARVEDILOL 12.5 MG: 12.5 TABLET, FILM COATED ORAL at 21:30

## 2024-01-14 NOTE — ED TRIAGE NOTES
To ER via EMS from home.  LNK [id number].  Pt had rt facial droop, rt arm flaccid, aphasia.  Symptoms resolving at this time.  Pt still with slurred speech.  Initial pressure 160/100.  Symptoms started resolving as b/p improved.

## 2024-01-14 NOTE — CONSULTS
"Neurology Consult Note    Consult Date: 1/14/2024    Referring MD: Jacinto Monreal MD    Reason for Consult I have been asked to see the patient in neurological consultation to render advice and opinion regarding left-sided weakness and aphasia    Antonette King is a 89 y.o. female with past medical history of coronary artery disease s/p CABG in 2000 [place] on dual antiplatelet therapy with aspirin and Plavix currently just on aspirin, hypertension, mixed hyperlipidemia.  Patient comes into the ED with strokelike symptoms which all resolved.  Neurology was consulted for the same.    As per ER documentation patient had sudden onset right-sided weakness vs left-sided weakness,facial droop and aphasia which resolved by the time she presented in the ER.  On further inquiry with patient's family and including the patient (who is very hard of hearing and has dysarthria at baseline because she has no teeth), they deny any focal weakness or aphasia, they state that she was having generalized weakness and had a fall and could not get up, she had a fall several days ago to with similar presentation.    Overall presentation I do not think this is a transient ischemic attack or a stroke. I think she must have had a syncopal episode.        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 04/16/2021    History of blood clots     Hx of radiation therapy     2001, left breast ca    Hyperlipidemia 04/16/2021    Hypertension 04/16/2021    S/P CABG (coronary artery bypass graft) 10/12/2022       Exam  BP (!) 207/117 (BP Location: Left arm, Patient Position: Lying)   Pulse 72   Temp 98.2 °F (36.8 °C) (Oral)   Resp 18   Ht 162.6 cm (64\")   Wt 70.5 kg (155 lb 6.8 oz)   SpO2 96%   BMI 26.68 kg/m²   Gen: NAD, vitals reviewed  MS: oriented x2, normal " comprehension repetition and fluency  CN: visual acuity grossly normal, PERRL, EOMI, no facial droop, no dysarthria  Motor: 4+/5 throughout upper and lower extremities, normal tone    DATA:    Lab Results   Component Value Date    GLUCOSE 94 01/14/2024    CALCIUM 9.9 01/14/2024     01/14/2024    K 4.6 01/14/2024    CO2 20.0 (L) 01/14/2024    CL 98 01/14/2024    BUN 28 (H) 01/14/2024    CREATININE 1.64 (H) 01/14/2024    EGFRIFNONA 63 04/16/2021    BCR 17.1 01/14/2024    ANIONGAP 18.0 (H) 01/14/2024     Lab Results   Component Value Date    WBC 10.47 01/14/2024    HGB 10.5 (L) 01/14/2024    HCT 32.8 (L) 01/14/2024    MCV 89.6 01/14/2024     01/14/2024       Lab review:   Sodium 136  Creatinine 1.64  Normal LFTs    A1c 5.5      WBC 10.47  Hemoglobin 10.5 and platelets 278      Imaging review:   CT head done on admission showed old right and left lacunar infarcts in basal ganglia and also old left parietal lobe stroke, no acute hypodensity or hypodensity    Diagnoses:  Concern for stroke versus TIA  Syncopal episode  Coronary artery disease s/p CABG  Essential hypertension    Pre-stroke MRS: 2  NIHSS: NIHSS:    Baseline  0-->Alert: keenly responsive  0-->Answers both questions correctly  0-->Performs both tasks correctly  0=normal  0=No visual loss  0=Normal symmetric movement  0-->No drift: limb holds 90 (or 45) degrees for full 10 secs  0-->No drift: limb holds 90 (or 45) degrees for full 10 secs  0-->No drift: limb holds 90 (or 45) degrees for full 10 secs  0-->No drift: limb holds 90 (or 45) degrees for full 10 secs  0=Absent  0=Normal; no sensory loss  0=No aphasia, normal  0=Normal  0=No abnormality    Total score: 0    Overall presentation a little inconsistent with the ER documentation and what the family is stating at bedside today, however less concern about TIA or a stroke at this point.    Plan  -Check bedside orthostatics sitting lying and standing  -MRI brain without ordered  -Carotid  ultrasound pending  -Can continue her home meds and normalize blood pressure    Spoke to family extensively and answered all their questions.

## 2024-01-14 NOTE — H&P
"    Name: Antonette King ADMIT: 2024   : 1934  PCP: Tonny Willis MD    MRN: [id number] LOS: 0 days   AGE/SEX: 89 y.o. female  ROOM: [facility] [place]/     Chief Complaint   Patient presents with    Neuro Deficit(s)       Subjective   Patient is a 89 y.o. female who presents to [facility] with the above chief complaint.  Past medical history is pertinent for coronary artery disease, dementia, hypertension, hyperlipidemia previous CABG and prior breast cancer.  She presented to the emergency room last night after she was found confused and weak.  Family is not really sure whether she truly had unilateral weakness or facial droop and aphasia but these are all documented in the emergency room notes.  Per the family and the patient she has been increasingly weak dizzy and not feeling like herself since she had her medications adjusted in the outpatient setting at her last cardiology appointment.  Per the family last night she was sitting on the edge of the bed and got somewhat \"woozy and dizzy\" she then slid down onto her bottom on the floor and was unable to get herself up.  She was confused and altered at the time but was back to baseline on her presentation to the emergency room.      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 2021    History of blood clots     Hx of radiation therapy     , left breast ca    Hyperlipidemia 2021    Hypertension 2021    S/P CABG (coronary artery bypass graft) 10/12/2022     Past Surgical History:   Procedure Laterality Date    BREAST BIOPSY      BREAST LUMPECTOMY Left 2011    CARDIAC SURGERY      CARPAL TUNNEL RELEASE      HYSTERECTOMY      TONSILLECTOMY       Family History   Problem Relation Age of Onset    Stroke Mother     Stroke " Brother     Breast cancer Neg Hx      Social History     Tobacco Use    Smoking status: Former     Types: Cigarettes    Smokeless tobacco: Never   Vaping Use    Vaping Use: Never used   Substance Use Topics    Alcohol use: No     Comment: caffeine use    Drug use: No     Medications Prior to Admission   Medication Sig Dispense Refill Last Dose    allopurinol (ZYLOPRIM) 300 MG tablet Take 1 tablet by mouth Daily.       ascorbic acid (VITAMIN C) 500 MG tablet Take 1 tablet by mouth Daily.       aspirin 81 MG chewable tablet Chew 1 tablet Daily.       atorvastatin (LIPITOR) 40 MG tablet Take 1 tablet by mouth Every Night. 30 tablet 0     busPIRone (BUSPAR) 10 MG tablet Take 1 tablet by mouth 3 (Three) Times a Day.       carvedilol (COREG) 12.5 MG tablet Take 1 tablet by mouth 2 (Two) Times a Day. 180 tablet 3     clopidogrel (PLAVIX) 75 MG tablet Take 1 tablet by mouth Daily.       hydrALAZINE (APRESOLINE) 25 MG tablet Take 1 tablet by mouth 2 (Two) Times a Day. 90 tablet 0     isosorbide mononitrate (IMDUR) 30 MG 24 hr tablet Take 1 tablet by mouth Daily. 90 tablet 3     LORazepam (ATIVAN) 0.5 MG tablet Take half to 1 tab po bid prn anxiety 10 tablet 0     losartan-hydrochlorothiazide (HYZAAR) 100-12.5 MG per tablet Take 1 tablet by mouth Daily.       multivitamin with minerals tablet tablet Take 1 tablet by mouth Daily.       venlafaxine XR (EFFEXOR-XR) 75 MG 24 hr capsule Take 1 capsule by mouth Daily.       vitamin D3 125 MCG (5000 UT) capsule capsule Take 1 capsule by mouth Daily.        Allergies:  Prednisone    Review of Systems     Objective    Vital Signs  Temp:  [97.5 °F (36.4 °C)-98.2 °F (36.8 °C)] 98.2 °F (36.8 °C)  Heart Rate:  [63-79] 72  Resp:  [16-18] 18  BP: (119-233)/() 207/117  SpO2:  [95 %-98 %] 96 %  on   ;   Device (Oxygen Therapy): room air  Body mass index is 26.68 kg/m².    Physical Exam  Constitutional:       General: She is not in acute distress.     Appearance: She is obese. She is  ill-appearing.   Cardiovascular:      Rate and Rhythm: Normal rate and regular rhythm.   Pulmonary:      Effort: No respiratory distress.      Breath sounds: Normal breath sounds.   Abdominal:      General: Bowel sounds are normal.      Palpations: Abdomen is soft.   Neurological:      Mental Status: She is alert.      Comments: Mild dysarthria and mild left facial asymmetry this is chronic and not new  She is extremely hard of hearing which does make adequate history difficult         Results Review:   I reviewed the patient's new clinical results.  Results from last 7 days   Lab Units 01/14/24  0642 01/14/24  0029   WBC 10*3/mm3 10.47 10.91*   HEMOGLOBIN g/dL 10.5* 9.6*   PLATELETS 10*3/mm3 278 292     Results from last 7 days   Lab Units 01/14/24  0642 01/14/24  0130   SODIUM mmol/L 136 142   POTASSIUM mmol/L 4.6 3.6   CHLORIDE mmol/L 98 105   CO2 mmol/L 20.0* 24.5   BUN mg/dL 28* 27*   CREATININE mg/dL 1.64* 1.53*   GLUCOSE mg/dL 94 108*   ALBUMIN g/dL  --  2.9*   BILIRUBIN mg/dL  --  0.2   ALK PHOS U/L  --  60   AST (SGOT) U/L  --  11   ALT (SGPT) U/L  --  7   Estimated Creatinine Clearance: 22.4 mL/min (A) (by C-G formula based on SCr of 1.64 mg/dL (H)).  Results from last 7 days   Lab Units 01/14/24  0852 01/14/24  0334 01/14/24  0130 01/14/24  0029   INR  1.12*  --   --  1.23*   HSTROP T ng/L  --  69* 73*  --      Results from last 7 days   Lab Units 01/14/24  0642   CHOLESTEROL mg/dL 204*   TRIGLYCERIDES mg/dL 97   HDL CHOL mg/dL 58       CT Head Without Contrast   Final Result         Electronically signed by Kendrick Varela M.D. 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  -Her MRI was completed this morning and is negative for acute stroke.  She does have some chronic changes and age-related changes but again nothing acute.  -Her orthostatics this morning are pretty positive.  Her blood pressure is quite elevated as her blood pressure medications were held for the potential for TIA and stroke.  However her systolic pressure drops almost 70 mmHg and her diastolic pressure drops 30 mmHg when standing from lying.  She did have symptoms with this.  She is on quite a few medications that could precipitate orthostatic hypotension.  She is on a thiazide diuretic a nitrate and on hydralazine as well.  I have consulted cardiology for assistance with her medications as they are the ones that order many of these meds and will get their input and opinion on how we should titrate things further.  I did go ahead and reorder her beta-blocker given her significant hypertension.  -Appreciate neurology evaluation and input.  Their recommendations have been reviewed  -Mechanical DVT prophylaxis  -Full code      I discussed the patients findings and my recommendations with patient, family, and nursing staff.          Milind Romeo MD  [facility]  01/14/24  10:20 EST

## 2024-01-14 NOTE — PLAN OF CARE
Goal Outcome Evaluation:      Received report from Zeyad HAND from ED.  Patient reported stable, Alert and oriented x4, stand by assist.  NSR on monitor.

## 2024-01-14 NOTE — THERAPY EVALUATION
Patient Name: Antonette King  : 1934    MRN: [id number]                              Today's Date: 2024       Admit Date: 2024    Visit Dx:     ICD-10-CM ICD-9-CM   1. TIA (transient ischemic attack)  G45.9 435.9   2. Elevated troponin  R79.89 790.6   3. 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    Hyperlipidemia    Thoracic aortic ectasia    Chest pain    Acute renal failure superimposed on stage 3a chronic kidney disease    Hypertensive urgency    Asymptomatic hypertensive urgency    CAD (coronary artery disease)    S/P CABG (coronary artery bypass graft)    TIA (transient ischemic attack)     Past Medical History:   Diagnosis Date    Aortic ectasia, thoracic     Breast cancer     left lumpectomy    CAD (coronary artery disease)     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 2021    History of blood clots     Hx of radiation therapy     , left breast ca    Hyperlipidemia 2021    Hypertension 2021    S/P CABG (coronary artery bypass graft) 10/12/2022     Past Surgical History:   Procedure Laterality Date    BREAST BIOPSY      BREAST LUMPECTOMY Left 2011    CARDIAC SURGERY      CARPAL TUNNEL RELEASE      HYSTERECTOMY      TONSILLECTOMY        General Information       Row Name 24 1304          Physical Therapy Time and Intention    Document Type evaluation  -CHRISTIANE     Mode of Treatment individual therapy;physical therapy  -CHRISTIANE       Row Name 24 1304          General Information    Patient Profile Reviewed yes  -CHRISTIANE     Prior Level of Function independent:;ADL's;all household mobility;transfer;min assist:;gait  -CHRISTIANE     Existing Precautions/Restrictions fall  -CHRISTIANE     Barriers to Rehab hearing deficit  -CHRISTIANE       Row Name 24 1304          Living Environment    People in  Home grandchild(jocelyn);other relative(s)  lives with grand-daughter and grand-daughter   -       Row Name 01/14/24 1304          Home Main Entrance    Number of Stairs, Main Entrance three  -CHRISTIANE     Stair Railings, Main Entrance railings safe and in good condition  -       Row Name 01/14/24 1304          Stairs Within Home, Primary    Stairs, Within Home, Primary 2 story home but all needs on main floor  -     Number of Stairs, Within Home, Primary twelve  -       Row Name 01/14/24 1304          Cognition    Orientation Status (Cognition) oriented x 4  -CHRISTIANE       Row Name 01/14/24 1304          Safety Issues, Functional Mobility    Safety Issues Affecting Function (Mobility) awareness of need for assistance;insight into deficits/self-awareness;judgment;positioning of assistive device;problem-solving;sequencing abilities;safety precautions follow-through/compliance  -     Impairments Affecting Function (Mobility) endurance/activity tolerance;strength;balance  -     Comment, Safety Issues/Impairments (Mobility) gait belt and non-skid socks worn  -               User Key  (r) = Recorded By, (t) = Taken By, (c) = Cosigned By      Initials Name Provider Type    Alisa Oliver, PT Physical Therapist                   Mobility       Row Name 01/14/24 1306          Bed Mobility    Bed Mobility supine-sit  -     Supine-Sit [place] (Bed Mobility) standby assist  -     Assistive Device (Bed Mobility) bed rails;head of bed elevated  -       Row Name 01/14/24 1306          Bed-Chair Transfer    Bed-Chair [place] (Transfers) contact guard;1 person assist  -     Assistive Device (Bed-Chair Transfers) walker, front-wheeled  -       Row Name 01/14/24 1306          Sit-Stand Transfer    Sit-Stand [place] (Transfers) contact guard;verbal cues;1 person assist  -     Assistive Device (Sit-Stand Transfers) walker, front-wheeled  -       Row Name 01/14/24 1306          Gait/Stairs  (Locomotion)    [place] Level (Gait) contact guard  -CHRISTIANE     Assistive Device (Gait) walker, front-wheeled  -CHRISTIANE     Distance in Feet (Gait) 100'  -CHRISTIANE     Deviations/Abnormal Patterns (Gait) bilateral deviations;base of support, narrow;al[place] decreased;gait speed decreased;stride length decreased  -CHRISTIANE     Bilateral Gait Deviations forward flexed posture;heel strike decreased  -CHRISTIANE     [place] Level (Stairs) not tested  -CHRISTIANE     Comment, (Gait/Stairs) slow pace, cues for stepping strategy, mild SOB at end of ambulation  -CHRISTIANE               User Key  (r) = Recorded By, (t) = Taken By, (c) = Cosigned By      Initials Name Provider Type    Alisa Oliver PT Physical Therapist                   Obj/Interventions       Row Name 01/14/24 1308          Range of Motion Comprehensive    General Range of Motion bilateral lower extremity ROM WFL  -       Row Name 01/14/24 1308          Strength Comprehensive (MMT)    Comment, General Manual Muscle Testing (MMT) Assessment generalized weakness  -       Row Name 01/14/24 1308          Motor Skills    Therapeutic Exercise other (see comments)  AP/QS/GS/HS X 10  -       Row Name 01/14/24 1308          Balance    Balance Assessment sitting static balance;sitting dynamic balance;standing static balance;standing dynamic balance  -CHRISTIANE     Static Sitting Balance standby assist  -CHRISTIANE     Dynamic Sitting Balance standby assist  -CHRISTIANE     Position, Sitting Balance supported;sitting edge of bed  -CHRISTIANE     Static Standing Balance contact guard;verbal cues;1-person assist  -CHRISTIANE     Dynamic Standing Balance contact guard;1-person assist  -CHRISTIANE     Position/Device Used, Standing Balance walker, front-wheeled  -CHRISTIANE     Comment, Balance Mild unsteadiness with no LOB noted  -               User Key  (r) = Recorded By, (t) = Taken By, (c) = Cosigned By      Initials Name Provider Type    Alisa Oliver PT Physical Therapist                   Goals/Plan       Row Name  01/14/24 1316          Bed Mobility Goal 1 (PT)    Activity/Assistive Device (Bed Mobility Goal 1, PT) bed mobility activities, all  -CHRISTIANE     [place] Level/Cues Needed (Bed Mobility Goal 1, PT) modified independence  -CHRISTIANE     Time Frame (Bed Mobility Goal 1, PT) 1 week  -CHRISTIANE       Row Name 01/14/24 1316          Transfer Goal 1 (PT)    Activity/Assistive Device (Transfer Goal 1, PT) sit-to-stand/stand-to-sit;bed-to-chair/chair-to-bed  -CHRISTIANE     [place] Level/Cues Needed (Transfer Goal 1, PT) modified independence  -CHRISTIANE     Time Frame (Transfer Goal 1, PT) 1 week  -CHRISTIANE       Row Name 01/14/24 1316          Gait Training Goal 1 (PT)    Activity/Assistive Device (Gait Training Goal 1, PT) gait (walking locomotion);walker, rolling  -CHRISTIANE     [place] Level (Gait Training Goal 1, PT) modified independence  -CHRISTIANE     Distance (Gait Training Goal 1, PT) 250'  -CHRISTIANE     Time Frame (Gait Training Goal 1, PT) 1 week  -CHRISTIANE       Row Name 01/14/24 1316          Stairs Goal 1 (PT)    Activity/Assistive Device (Stairs Goal 1, PT) stairs, all skills  -CHRISTIANE     [place] Level/Cues Needed (Stairs Goal 1, PT) standby assist  -CHRISTIANE     Number of Stairs (Stairs Goal 1, PT) 3  -CHRISTIANE     Time Frame (Stairs Goal 1, PT) 1 week  -CHRISTIANE       Row Name 01/14/24 1316          Therapy Assessment/Plan (PT)    Planned Therapy Interventions (PT) balance training;bed mobility training;gait training;home exercise program;patient/family education;stair training;strengthening;transfer training;neuromuscular re-education  -CHRISTIANE               User Key  (r) = Recorded By, (t) = Taken By, (c) = Cosigned By      Initials Name Provider Type    Alisa Oliver, PT Physical Therapist                   Clinical Impression       Row Name 01/14/24 1309          Pain    Pretreatment Pain Rating 0/10 - no pain  -CHRISTIANE     Posttreatment Pain Rating 0/10 - no pain  -CHRISTIANE       Row Name 01/14/24 1309          Plan of Care Review    Plan of Care Reviewed With patient   -CHRISTIANE     Progress improving  -     Outcome Evaluation Ms. King is an 88 y/o F who presented to [facility] and per pt chart, she was experiencing sudden onself of left-sided flaccidity, left facial droop, as well as severe aphasia. She also experienced multiple falls prior to hospital admission. She was admitted for further work up for possible TIA. She is [place] and accompanied by her son throughout session. She reports residing with her grand-daughter in 2-story home with 3STE and all needs on main floor. She was IADLs with occasional assistance needed for long distance ambulation. She utilized rollator intermittently at baseline. She demonstrates below baseline function, mild unsteadiness during ambulation, gross motor control symmetrical and generalized muscle weakness. She required SBA for supine-sit, SBA for STS before ambulating 100' with CGA while using RWx. Ms. King would benefit from skilled PT services during her hospital stay. PT recommends D/C home with assist vs home with HH pending progress  -       Row Name 01/14/24 3036          Therapy Assessment/Plan (PT)    Rehab Potential (PT) good, to achieve stated therapy goals  -     Criteria for Skilled Interventions Met (PT) yes;meets criteria;skilled treatment is necessary  -     Therapy Frequency (PT) 6 times/wk  -       Row Name 01/14/24 1308          Vital Signs    O2 Delivery Pre Treatment room air  -CHRISTIANE     O2 Delivery Intra Treatment room air  -CHRISTIANE     O2 Delivery Post Treatment room air  -CHRISTIANE     Pre Patient Position Supine  -CHRISTIANE     Intra Patient Position Standing  -CHRISTIANE     Post Patient Position Sitting  -       Row Name 01/14/24 1308          Positioning and Restraints    Pre-Treatment Position in bed  -CHRISTIANE     Post Treatment Position chair  -CHRISTIANE     In Chair reclined;call light within reach;encouraged to call for assist;exit alarm on;with family/caregiver;legs elevated  -               User Key  (r) = Recorded By, (t) = Taken By, (c) = Cosigned By       Initials Name Provider Type    Alisa Oliver, PT Physical Therapist                   Outcome Measures       Row Name 01/14/24 1316 01/14/24 0850       How much help from another person do you currently need...    Turning from your back to your side while in flat bed without using bedrails? 3  -CHRISTIANE 3  -KP    Moving from lying on back to sitting on the side of a flat bed without bedrails? 3  -CHRISTIANE 3  -KP    Moving to and from a bed to a chair (including a wheelchair)? 3  -CHRISTIANE 3  -KP    Standing up from a chair using your arms (e.g., wheelchair, bedside chair)? 3  -CHRISTIANE 3  -KP    Climbing 3-5 steps with a railing? 3  -CHRISTIANE 3  -KP    To walk in hospital room? 3  -CHRISTIANE 3  -KP    AM-PAC 6 Clicks Score (PT) 18  -CHRISTIANE 18  -KP    Highest Level of Mobility Goal 6 --> Walk 10 steps or more  -CHRISTIANE 6 --> Walk 10 steps or more  -KP      Row Name 01/14/24 0634          How much help from another person do you currently need...    Turning from your back to your side while in flat bed without using bedrails? 3  -RP     Moving from lying on back to sitting on the side of a flat bed without bedrails? 3  -RP     Moving to and from a bed to a chair (including a wheelchair)? 3  -RP     Standing up from a chair using your arms (e.g., wheelchair, bedside chair)? 3  -RP     Climbing 3-5 steps with a railing? 2  -RP     To walk in hospital room? 3  -RP     AM-PAC 6 Clicks Score (PT) 17  -RP     Highest Level of Mobility Goal 5 --> Static standing  -RP       Row Name 01/14/24 1316 01/14/24 1145       Modified [place] Scale    Modified [place] Scale 2 - Slight disability.  Unable to carry out all previous activities but able to look after own affairs without assistance.  -CHRISTIANE 2 - Slight disability.  Unable to carry out all previous activities but able to look after own affairs without assistance.  -ELI      Row Name 01/14/24 1145          Functional Assessment    Outcome Measure Options Modified [place];AM-PAC 6 Clicks Daily Activity (OT)  -ELI                User Key  (r) = Recorded By, (t) = Taken By, (c) = Cosigned By      Initials Name Provider Type    Lauren Flor, RN Registered Nurse    Alisa Oliver, PT Physical Therapist    Justina Price, RN Registered Nurse    Renea Holder, OT Occupational Therapist                                 Physical Therapy Education       Title: PT OT SLP Therapies (Done)       Topic: Physical Therapy (Done)       Point: Mobility training (Done)       Learning Progress Summary             Patient Acceptance, E,TB, VU,DU by CHRISTIANE at 1/14/2024 1317                         Point: Home exercise program (Done)       Learning Progress Summary             Patient Acceptance, E,TB, VU,DU by CHRISTIANE at 1/14/2024 1317                         Point: Body mechanics (Done)       Learning Progress Summary             Patient Acceptance, E,TB, VU,DU by CHRISTIANE at 1/14/2024 1317                         Point: Precautions (Done)       Learning Progress Summary             Patient Acceptance, E,TB, VU,DU by CHRISTIANE at 1/14/2024 1317                                         User Key       Initials Effective Dates Name Provider Type Discipline    CHRISTIANE 05/19/21 -  Alisa Reyes, PT Physical Therapist PT                  PT Recommendation and Plan  Planned Therapy Interventions (PT): balance training, bed mobility training, gait training, home exercise program, patient/family education, stair training, strengthening, transfer training, neuromuscular re-education  Plan of Care Reviewed With: patient  Progress: improving  Outcome Evaluation: Ms. King is an 89 y/o F who presented to [facility] and per pt chart, she was experiencing sudden onself of left-sided flaccidity, left facial droop, as well as severe aphasia. She also experienced multiple falls prior to hospital admission. She was admitted for further work up for possible TIA. She is [facility] and accompanied by her son throughout session. She reports residing with her grand-daughter in  2-story home with 3STE and all needs on main floor. She was IADLs with occasional assistance needed for long distance ambulation. She utilized rollator intermittently at baseline. She demonstrates below baseline function, mild unsteadiness during ambulation, gross motor control symmetrical and generalized muscle weakness. She required SBA for supine-sit, SBA for STS before ambulating 100' with CGA while using RWx. Ms. King would benefit from skilled PT services during her hospital stay. PT recommends D/C home with assist vs home with HH pending progress     Time Calculation:         PT Charges       Row Name 01/14/24 1317             Time Calculation    Start Time 0845  -CHRISTIANE      Stop Time 0913  -CHRISTIANE      Time Calculation (min) 28 min  -CHRISTIANE      PT Received On 01/14/24  -CHRISTIANE      PT - Next Appointment 01/15/24  -CHRISTIANE      PT Goal Re-Cert Due Date 01/21/24  -CHRISTIANE                User Key  (r) = Recorded By, (t) = Taken By, (c) = Cosigned By      Initials Name Provider Type    Alisa Oliver, PT Physical Therapist                  Therapy Charges for Today       Code Description Service Date Service Provider Modifiers Qty    36542488641  PT EVAL MOD COMPLEXITY 3 1/14/2024 Alisa Reyes, PT GP 1    47809550457 HC PT THERAPEUTIC ACT EA 15 MIN 1/14/2024 Alisa Reyes, PT GP 1            PT G-Codes  Outcome Measure Options: Modified [place], AM-PAC 6 Clicks Daily Activity (OT)  AM-PAC 6 Clicks Score (PT): 18  AM-PAC 6 Clicks Score (OT): 21  Modified [place] Scale: 2 - Slight disability.  Unable to carry out all previous activities but able to look after own affairs without assistance.  PT Discharge Summary  Anticipated Discharge Disposition (PT): home with assist, home with home health    Alisa Reyes, PT  1/14/2024

## 2024-01-14 NOTE — PLAN OF CARE
Goal Outcome Evaluation:  Plan of Care Reviewed With: patient        Progress: improving  Outcome Evaluation: Ms. King is an 89 y/o F who presented to [facility] and per pt chart, she was experiencing sudden onself of left-sided flaccidity, left facial droop, as well as severe aphasia. She also experienced multiple falls prior to hospital admission. She was admitted for further work up for possible TIA. She is [place] and accompanied by her son throughout session. She reports residing with her grand-daughter in 2-story home with 3STE and all needs on main floor. She was IADLs with occasional assistance needed for long distance ambulation. She utilized rollator intermittently at baseline. She demonstrates below baseline function, mild unsteadiness during ambulation, gross motor control symmetrical and generalized muscle weakness. She required SBA for supine-sit, SBA for STS before ambulating 100' with CGA while using RWx. Ms. King would benefit from skilled PT services during her hospital stay. PT recommends D/C home with assist vs home with  pending progress      Anticipated Discharge Disposition (PT): home with assist, home with home health

## 2024-01-14 NOTE — ED NOTES
"Nursing report ED to floor  Antonette King  89 y.o.  female    HPI (triage note):   Chief Complaint   Patient presents with    Neuro Deficit(s)       Admitting doctor:   Anish Navarro MD    Admitting diagnosis:   The primary encounter diagnosis was TIA (transient ischemic attack). Diagnoses of Elevated troponin and Chronic kidney disease, unspecified CKD stage were also pertinent to this visit.    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 04/16/2021    History of blood clots     Hx of radiation therapy     2001, left breast ca    Hyperlipidemia 04/16/2021    Hypertension 04/16/2021    S/P CABG (coronary artery bypass graft) 10/12/2022        Weight:       01/14/24  0029   Weight: 73.1 kg (161 lb 2.5 oz)       Most recent vitals:   Vitals:    01/14/24 0029 01/14/24 0125 01/14/24 0127 01/14/24 0131   BP: 119/79 158/100 157/94 157/100   BP Location: Left arm      Patient Position: Lying      Pulse: 63 64 66 63   Resp: 16      Temp: 97.6 °F (36.4 °C)      SpO2: 96% 97% 96% 96%   Weight: 73.1 kg (161 lb 2.5 oz)      Height: 157.5 cm (62\")          Active LDAs/IV Access:   Lines, Drains & Airways       Active LDAs       Name Placement date Placement time Site Days    Peripheral IV 01/14/24 Right Antecubital 01/14/24  --  Antecubital  less than 1                    Labs (abnormal labs have a star):   Labs Reviewed   COMPREHENSIVE METABOLIC PANEL - Abnormal; Notable for the following components:       Result Value    Glucose 108 (*)     BUN 27 (*)     Creatinine 1.53 (*)     Calcium 8.4 (*)     Total Protein 5.6 (*)     Albumin 2.9 (*)     eGFR 32.4 (*)     All other components " within normal limits    Narrative:     GFR Normal >60  Chronic Kidney Disease <60  Kidney Failure <15    The GFR formula is only valid for adults with stable renal function between ages 18 and 70.   PROTIME-INR - Abnormal; Notable for the following components:    Protime 15.6 (*)     INR 1.23 (*)     All other components within normal limits   TROPONIN - Abnormal; Notable for the following components:    HS Troponin T 73 (*)     All other components within normal limits    Narrative:     High Sensitive Troponin T Reference Range:  <14.0 ng/L- Negative Female for AMI  <22.0 ng/L- Negative Male for AMI  >=14 - Abnormal Female indicating possible myocardial injury.  >=22 - Abnormal Male indicating possible myocardial injury.   Clinicians would have to utilize clinical acumen, EKG, Troponin, and serial changes to determine if it is an Acute Myocardial Infarction or myocardial injury due to an underlying chronic condition.        CBC WITH AUTO DIFFERENTIAL - Abnormal; Notable for the following components:    WBC 10.91 (*)     RBC 3.40 (*)     Hemoglobin 9.6 (*)     Hematocrit 29.5 (*)     RDW 15.7 (*)     Lymphocyte % 19.2 (*)     Neutrophils, Absolute 7.93 (*)     All other components within normal limits   POCT GLUCOSE FINGERSTICK - Abnormal; Notable for the following components:    Glucose 142 (*)     All other components within normal limits   APTT - Normal   HIGH SENSITIVITIY TROPONIN T 2HR   TYPE AND SCREEN   CBC AND DIFFERENTIAL    Narrative:     The following orders were created for panel order CBC & Differential.  Procedure                               Abnormality         Status                     ---------                               -----------         ------                     CBC Auto Differential[[id number]]        Abnormal            Final result                 Please view results for these tests on the individual orders.       EKG:   ECG 12 Lead Stroke Evaluation   Preliminary Result   HEART RATE= 62   bpm   RR Interval= 968  ms   KS Interval= 181  ms   P Horizontal Axis= -49  deg   P Front Axis= 24  deg   QRSD Interval= 116  ms   QT Interval= 531  ms   QTcB= 540  ms   QRS Axis= 16  deg   T Wave Axis= 45  deg   - ABNORMAL ECG -   Sinus rhythm   Incomplete right bundle branch block   LVH with secondary repolarization abnormality   Prolonged QT interval   Electronically Signed By:    Date and Time of Study: 2024-01-14 00:39:19          Meds given in ED:   Medications   sodium chloride 0.9 % flush 10 mL (has no administration in time range)       Imaging results:  CT Head Without Contrast    Result Date: 1/14/2024  Electronically signed by Kendrick Varela M.D. on 01-14-24 at 0128     Ambulatory status:   - stand by assist    Social issues:   Social History     Socioeconomic History    Marital status:     Number of children: 4   Tobacco Use    Smoking status: Former     Types: Cigarettes    Smokeless tobacco: Never   Vaping Use    Vaping Use: Never used   Substance and Sexual Activity    Alcohol use: No     Comment: caffeine use    Drug use: No    Sexual activity: Defer          NIH Stroke Scale:  Interval: baseline      Zeyad Elam RN  01/14/24 03:22 EST

## 2024-01-14 NOTE — PROGRESS NOTES
[facility] Acute Inpt Rehab Note    Referral received via stroke order set. Please note this is a screening only, rehab admissions will not actively be evaluating this patient. If felt patient is appropriate for our services once therapies begin, please call our office at [phone], to initiate a full referral.    Thank you,  Farheen Dallas, RN  Rehab Admission Nurse

## 2024-01-14 NOTE — PLAN OF CARE
Goal Outcome Evaluation:  Plan of Care Reviewed With: patient, son           Outcome Evaluation: Pt is an 88 y/o F who presented to [facility] ER with sudden onset R side weakness, facial droop, and aphasia, per ER documentation, and admitted for further work up d/t possible TIA. Pt's PMHx significant for CAD s/p CABG, HTN, mixed hyperlipidemia. Pt was [facility] on OT arrival and agreeable to evaluation this AM with son at bedside. Pt is [place] with L hearing aid in place and repetition is needed at times but pt responded appropriately to all OT inquiries this AM. Pt and son report that pt fell out of chair and was unable to get up and also had similar falls several days before this. Pt has been living with her granddaughter since her  passed away in November 2023  and has rollator walker at home but does not use and is IND with ADLs at baseline. Pt presents to OT with generalized weakness in BUE/BLE and decreased balance, endurance and activity tolerance affecting her IND with ADLs. Skilled OT services are medically indicated to address aforementioned skills and deficits. OT recommends home with assist at DC.      Anticipated Discharge Disposition (OT): home with assist, home with 24/7 care

## 2024-01-14 NOTE — ED NOTES
Pt reports she was trying to climb in her bed and she fell. Pt reports pain to her right lower leg. Pt family member found patient, states patient was unresponsive.

## 2024-01-14 NOTE — THERAPY EVALUATION
Patient Name: Antonette King  : 1934    MRN: [id number]                              Today's Date: 2024       Admit Date: 2024    Visit Dx:     ICD-10-CM ICD-9-CM   1. TIA (transient ischemic attack)  G45.9 435.9   2. Elevated troponin  R79.89 790.6   3. 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    Hyperlipidemia    Thoracic aortic ectasia    Chest pain    Acute renal failure superimposed on stage 3a chronic kidney disease    Hypertensive urgency    Asymptomatic hypertensive urgency    CAD (coronary artery disease)    S/P CABG (coronary artery bypass graft)    TIA (transient ischemic attack)     Past Medical History:   Diagnosis Date    Aortic ectasia, thoracic     Breast cancer     left lumpectomy    CAD (coronary artery disease)     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 2021    History of blood clots     Hx of radiation therapy     , left breast ca    Hyperlipidemia 2021    Hypertension 2021    S/P CABG (coronary artery bypass graft) 10/12/2022     Past Surgical History:   Procedure Laterality Date    BREAST BIOPSY      BREAST LUMPECTOMY Left 2011    CARDIAC SURGERY      CARPAL TUNNEL RELEASE      HYSTERECTOMY      TONSILLECTOMY        General Information       Row Name 24 1117          OT Time and Intention    Document Type evaluation  -ELI     Mode of Treatment occupational therapy;individual therapy  -ELI       Row Name 24 1117          General Information    Patient Profile Reviewed yes  -ELI     Prior Level of Function independent:;ADL's  IND with ADLs  -ELI     Existing Precautions/Restrictions fall  -ELI     Barriers to Rehab hearing deficit  [place] with L hearing aid at baseline  -ELI       Row Name 24 1117          Living Environment    People in Home  grandchild(jocelyn);other relative(s)  lives with granddaughter and granddaughter's   -ELI       Row Name 01/14/24 1117          Cognition    Orientation Status (Cognition) oriented x 4  -ELI       Row Name 01/14/24 1117          Safety Issues, Functional Mobility    Impairments Affecting Function (Mobility) endurance/activity tolerance;strength;balance  -ELI     Comment, Safety Issues/Impairments (Mobility) Gait belt and non skid socks worn.  -ELI               User Key  (r) = Recorded By, (t) = Taken By, (c) = Cosigned By      Initials Name Provider Type    Renea Holder OT Occupational Therapist                     Mobility/ADL's       Row Name 01/14/24 1120          Bed Mobility    Comment, (Bed Mobility) Pt UIC on OT arrival  -ELI       Row Name 01/14/24 1120          Transfers    Transfers sit-stand transfer;other (see comments)  -ELI     Comment, (Transfers) txfer from chair around bed to sink to door and back to chair in room CGA/MIN A with no AD  -ELI       Row Name 01/14/24 1120          Sit-Stand Transfer    Sit-Stand [place] (Transfers) contact guard;verbal cues  -ELI     Assistive Device (Sit-Stand Transfers) --  No AD  -ELI       Row Name 01/14/24 1120          Functional Mobility    Functional Mobility- Ind. Level contact guard assist;minimum assist (75% patient effort)  -ELI     Functional Mobility- Device --  no AD; HHA  -ELI     Functional Mobility- Safety Issues balance decreased during turns  -ELI     Functional Mobility- Comment unsteady gait but no overt LOB  -ELI       Row Name 01/14/24 1120          Activities of Daily Living    BADL Assessment/Intervention lower body dressing;grooming  -ELI       Row Name 01/14/24 1120          Lower Body Dressing Assessment/Training    [place] Level (Lower Body Dressing) socks;doff;don;verbal cues  -ELI     Position (Lower Body Dressing) unsupported sitting  sitting in chair  -ELI       Row Name 01/14/24 1120          Grooming Assessment/Training     [place] Level (Grooming) wash face, hands;set up  -ELI     Position (Grooming) unsupported sitting  sitting in chair  -ELI       Row Name 01/14/24 1120          Toileting Assessment/Training    Comment, (Toileting) purewick in place  -ELI               User Key  (r) = Recorded By, (t) = Taken By, (c) = Cosigned By      Initials Name Provider Type    ELI Renea Chandler OT Occupational Therapist                   Obj/Interventions       Row Name 01/14/24 1134          Sensory Assessment (Somatosensory)    Sensory Assessment (Somatosensory) sensation intact  -ELI       Row Name 01/14/24 1134          Vision Assessment/Intervention    Visual Impairment/Limitations WFL  -ELI       Row Name 01/14/24 1134          Range of Motion Comprehensive    General Range of Motion bilateral upper extremity ROM WFL  -ELI     Comment, General Range of Motion BUE AROM WFL  -ELI       Row Name 01/14/24 1134          Strength Comprehensive (MMT)    Comment, General Manual Muscle Testing (MMT) Assessment Generalized weakness; BUE grossly 4/5  -ELI       Row Name 01/14/24 1134          Motor Skills    Motor Skills functional endurance  -ELI     Functional Endurance Fair  -ELI       Row Name 01/14/24 1134          Balance    Balance Assessment sitting static balance;sitting dynamic balance;standing static balance;standing dynamic balance  -ELI     Static Sitting Balance standby assist  -ELI     Dynamic Sitting Balance contact guard;verbal cues  -ELI     Position, Sitting Balance unsupported;sitting in chair  -ELI     Static Standing Balance contact guard;verbal cues  -ELI     Dynamic Standing Balance contact guard;minimal assist;verbal cues  -ELI     Position/Device Used, Standing Balance --  no AD  -ELI     Balance Interventions sitting;standing;occupation based/functional task  -ELI     Comment, Balance Unsteady gait but no overt LOB  -ELI               User Key  (r) = Recorded By, (t) = Taken By, (c) = Cosigned By      Initials Name Provider Type    ELI  Renea Chandler, OT Occupational Therapist                   Goals/Plan       Row Name 01/14/24 1145          Bed Mobility Goal 1 (OT)    Activity/Assistive Device (Bed Mobility Goal 1, OT) bed mobility activities, all  -ELI     [place] Level/Cues Needed (Bed Mobility Goal 1, OT) modified independence  -ELI     Time Frame (Bed Mobility Goal 1, OT) short term goal (STG);2 weeks  -ELI     Progress/Outcomes (Bed Mobility Goal 1, OT) new goal  -ELI       Row Name 01/14/24 1145          Transfer Goal 1 (OT)    Activity/Assistive Device (Transfer Goal 1, OT) transfers, all  -ELI     [place] Level/Cues Needed (Transfer Goal 1, OT) standby assist;contact guard required;verbal cues required  -ELI     Time Frame (Transfer Goal 1, OT) short term goal (STG);2 weeks  -ELI     Progress/Outcome (Transfer Goal 1, OT) new goal  -ELI       Row Name 01/14/24 1145          Dressing Goal 1 (OT)    Activity/Device (Dressing Goal 1, OT) dressing skills, all;upper body dressing;lower body dressing  -ELI     [place]/Cues Needed (Dressing Goal 1, OT) modified independence  -ELI     Time Frame (Dressing Goal 1, OT) short term goal (STG);2 weeks  -ELI     Progress/Outcome (Dressing Goal 1, OT) new goal  -ELI       Row Name 01/14/24 1145          Toileting Goal 1 (OT)    Activity/Device (Toileting Goal 1, OT) toileting skills, all  -ELI     [place] Level/Cues Needed (Toileting Goal 1, OT) modified independence  -ELI     Time Frame (Toileting Goal 1, OT) short term goal (STG);2 weeks  -ELI     Progress/Outcome (Toileting Goal 1, OT) new goal  -ELI       Row Name 01/14/24 1145          Grooming Goal 1 (OT)    Activity/Device (Grooming Goal 1, OT) grooming skills, all  -ELI     [place] (Grooming Goal 1, OT) modified independence  -ELI     Time Frame (Grooming Goal 1, OT) short term goal (STG);2 weeks  -ELI     Progress/Outcome (Grooming Goal 1, OT) new goal  -ELI       Row Name 01/14/24 1145          Therapy Assessment/Plan (OT)    Planned  Therapy Interventions (OT) activity tolerance training;BADL retraining;functional balance retraining;occupation/activity based interventions;patient/caregiver education/training;strengthening exercise;transfer/mobility retraining;adaptive equipment training;ROM/therapeutic exercise;neuromuscular control/coordination retraining  -ELI               User Key  (r) = Recorded By, (t) = Taken By, (c) = Cosigned By      Initials Name Provider Type    Renea Holder, PEG Occupational Therapist                   Clinical Impression       Row Name 01/14/24 1114          Pain Assessment    Pretreatment Pain Rating 0/10 - no pain  -ELI       Row Name 01/14/24 1114          Plan of Care Review    Plan of Care Reviewed With patient;son  -ELI     Outcome Evaluation Pt is an 89 y/o F who presented to [facility] ER with sudden onset R side weakness, facial droop, and aphasia, per ER documentation, and admitted for further work up d/t possible TIA. Pt's PMHx significant for CAD s/p CABG, HTN, mixed hyperlipidemia. Pt was UIC on OT arrival and agreeable to evaluation this AM with son at bedside. Pt is [place] with L hearing aid in place and repetition is needed at times but pt responded appropriately to all OT inquiries this AM. Pt and son report that pt fell out of chair and was unable to get up and also had similar falls several days before this. Pt has been living with her granddaughter since her  passed away in November 2023  and has rollator walker at home but does not use and is IND with ADLs at baseline. Pt presents to OT with generalized weakness in BUE/BLE and decreased balance, endurance and activity tolerance affecting her IND with ADLs. Skilled OT services are medically indicated to address aforementioned skills and deficits. OT recommends home with assist at [place].  -ELI       Row Name 01/14/24 1114          Therapy Assessment/Plan (OT)    Rehab Potential (OT) good, to achieve stated therapy goals  -ELI     Criteria for Skilled  Therapeutic Interventions Met (OT) yes;skilled treatment is necessary  -ELI     Therapy Frequency (OT) 5 times/wk  -ELI       Row Name 01/14/24 1114          Therapy Plan Review/Discharge Plan (OT)    Anticipated Discharge Disposition (OT) home with assist;home with 24/7 care  -ELI       Row Name 01/14/24 1114          Vital Signs    O2 Delivery Pre Treatment room air  -ELI     O2 Delivery Intra Treatment room air  -ELI     O2 Delivery Post Treatment room air  -ELI       Row Name 01/14/24 1114          Positioning and Restraints    Pre-Treatment Position sitting in chair/recliner  -ELI     Post Treatment Position chair  -ELI     In Chair call light within reach;encouraged to call for assist;exit alarm on;with family/caregiver;with other staff;reclined  -ELI               User Key  (r) = Recorded By, (t) = Taken By, (c) = Cosigned By      Initials Name Provider Type    Renea Holder, OT Occupational Therapist                   Outcome Measures       Row Name 01/14/24 1145          How much help from another is currently needed...    Putting on and taking off regular lower body clothing? 4  -ELI     Bathing (including washing, rinsing, and drying) 3  -ELI     Toileting (which includes using toilet bed pan or urinal) 3  -ELI     Putting on and taking off regular upper body clothing 4  -ELI     Taking care of personal grooming (such as brushing teeth) 3  -ELI     Eating meals 4  -ELI     AM-PAC 6 Clicks Score (OT) 21  -ELI       Row Name 01/14/24 0850 01/14/24 0634       How much help from another person do you currently need...    Turning from your back to your side while in flat bed without using bedrails? 3  -KP 3  -RP    Moving from lying on back to sitting on the side of a flat bed without bedrails? 3  -KP 3  -RP    Moving to and from a bed to a chair (including a wheelchair)? 3  -KP 3  -RP    Standing up from a chair using your arms (e.g., wheelchair, bedside chair)? 3  -KP 3  -RP    Climbing 3-5 steps with a railing? 3  -KP 2   -RP    To walk in hospital room? 3  -KP 3  -RP    AM-PAC 6 Clicks Score (PT) 18  -KP 17  -RP    Highest Level of Mobility Goal 6 --> Walk 10 steps or more  -KP 5 --> Static standing  -RP      Row Name 01/14/24 1145          Modified [place] Scale    Modified Philipp Scale 2 - Slight disability.  Unable to carry out all previous activities but able to look after own affairs without assistance.  -ELI       Row Name 01/14/24 1145          Functional Assessment    Outcome Measure Options Modified Philipp;AM-PAC 6 Clicks Daily Activity (OT)  -ELI               User Key  (r) = Recorded By, (t) = Taken By, (c) = Cosigned By      Initials Name Provider Type    Lauren Flor, RN Registered Nurse    Justina Price, RN Registered Nurse    Renea Holder OT Occupational Therapist                    Occupational Therapy Education       Title: PT OT SLP Therapies (Done)       Topic: Occupational Therapy (Done)       Point: ADL training (Done)       Description:   Instruct learner(s) on proper safety adaptation and remediation techniques during self care or transfers.   Instruct in proper use of assistive devices.                  Learning Progress Summary             Patient Acceptance, E, VU by ELI at 1/14/2024 1146   Family Acceptance, E, VU by ELI at 1/14/2024 1146                         Point: Home exercise program (Done)       Description:   Instruct learner(s) on appropriate technique for monitoring, assisting and/or progressing therapeutic exercises/activities.                  Learning Progress Summary             Patient Acceptance, E, VU by ELI at 1/14/2024 1146   Family Acceptance, E, VU by ELI at 1/14/2024 1146                         Point: Precautions (Done)       Description:   Instruct learner(s) on prescribed precautions during self-care and functional transfers.                  Learning Progress Summary             Patient Acceptance, E, VU by ELI at 1/14/2024 1146   Family Acceptance, E, VU by ELI at  1/14/2024 1146                         Point: Body mechanics (Done)       Description:   Instruct learner(s) on proper positioning and spine alignment during self-care, functional mobility activities and/or exercises.                  Learning Progress Summary             Patient Acceptance, E, VU by ELI at 1/14/2024 1146   Family Acceptance, E, VU by ELI at 1/14/2024 1146                                         User Key       Initials Effective Dates Name Provider Type Discipline    ELI 10/12/23 -  Renea Chandler OT Occupational Therapist OT                  OT Recommendation and Plan  Planned Therapy Interventions (OT): activity tolerance training, BADL retraining, functional balance retraining, occupation/activity based interventions, patient/caregiver education/training, strengthening exercise, transfer/mobility retraining, adaptive equipment training, ROM/therapeutic exercise, neuromuscular control/coordination retraining  Therapy Frequency (OT): 5 times/wk  Plan of Care Review  Plan of Care Reviewed With: patient, son  Outcome Evaluation: Pt is an 88 y/o F who presented to [facility] ER with sudden onset R side weakness, facial droop, and aphasia, per ER documentation, and admitted for further work up d/t possible TIA. Pt's PMHx significant for CAD s/p CABG, HTN, mixed hyperlipidemia. Pt was UIC on OT arrival and agreeable to evaluation this AM with son at bedside. Pt is [place] with L hearing aid in place and repetition is needed at times but pt responded appropriately to all OT inquiries this AM. Pt and son report that pt fell out of chair and was unable to get up and also had similar falls several days before this. Pt has been living with her granddaughter since her  passed away in November 2023  and has rollator walker at home but does not use and is IND with ADLs at baseline. Pt presents to OT with generalized weakness in BUE/BLE and decreased balance, endurance and activity tolerance affecting her IND with  ADLs. Skilled OT services are medically indicated to address aforementioned skills and deficits. OT recommends home with assist at [place].     Time Calculation:   Evaluation Complexity (OT)  Review Occupational Profile/Medical/Therapy History Complexity: brief/low complexity  Assessment, Occupational Performance/Identification of Deficit Complexity: 1-3 performance deficits  Clinical Decision Making Complexity (OT): problem focused assessment/low complexity  Overall Complexity of Evaluation (OT): low complexity     Time Calculation- OT       Row Name 01/14/24 1147             Time Calculation- OT    OT Start Time 0910  -ELI      OT Stop Time 0935  -ELI      OT Time Calculation (min) 25 min  -ELI      Total Timed Code Minutes- OT 15 minute(s)  -ELI      OT Received On 01/14/24  -ELI      OT - Next Appointment 01/15/24  -ELI      OT Goal Re-Cert Due Date 01/28/24  -ELI         Timed Charges    85825 - OT Self Care/Mgmt Minutes 15  -ELI         Untimed Charges    OT Eval/Re-eval Minutes 10  -ELI         Total Minutes    Timed Charges Total Minutes 15  -ELI      Untimed Charges Total Minutes 10  -ELI       Total Minutes 25  -ELI                User Key  (r) = Recorded By, (t) = Taken By, (c) = Cosigned By      Initials Name Provider Type    ELI Renea Chandler OT Occupational Therapist                  Therapy Charges for Today       Code Description Service Date Service Provider Modifiers Qty    29730944909 HC OT SELF CARE/MGMT/TRAIN EA 15 MIN 1/14/2024 Renea Chandler OT GO 1    56150605529 HC OT EVAL LOW COMPLEXITY 2 1/14/2024 Renea Chandler OT GO 1                 Renea Chandler OT  1/14/2024

## 2024-01-14 NOTE — PLAN OF CARE
Goal Outcome Evaluation:      On call provider notified of elevated b/p.  Provider called and this nurse received and followed new orders.  Day nurse notified as well.  No symptoms at this time with HTN.  IV began to leak and became occluded.  IV fluids were not able to be initiated at this time.  Day nurse aware.

## 2024-01-14 NOTE — ED PROVIDER NOTES
EMERGENCY DEPARTMENT ENCOUNTER    Room Number:  S506/1  PCP: Tonny Willis MD  Historian: Patient/EMS/family report      HPI:  Chief Complaint: Strokelike symptoms  A complete HPI/ROS/PMH/PSH/SH/FH are unobtainable due to: None  Context: Antonette King is a 89 y.o. female who presents to the ED c/o sudden onset of strokelike symptoms that occurred just over an hour prior to ED arrival today.  The family and EMS report that as they arrived, she had a complete left-sided flaccidity, left facial droop, as well as severe aphasia.  Just prior to ED arrival today, most although symptoms have completely resolved.  Speaking with the patient, she has no current physical complaints.  She denies weakness/numbness, headache, chest pain, difficulty with speech, blurry vision, or nausea/vomiting.  She does report that she had a mechanical fall yesterday.            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 04/16/2021    Hyperlipidemia 04/16/2021    Thoracic aortic ectasia 09/06/2022    Chest pain 07/31/2023    Acute renal failure superimposed on stage 3a chronic kidney disease 08/01/2023    Hypertensive urgency 08/02/2023    Asymptomatic hypertensive urgency 08/02/2023    CAD (coronary artery disease) 08/10/2023    S/P CABG (coronary artery bypass graft) 10/12/2022     Resolved Ambulatory Problems     Diagnosis Date Noted    Acute UTI 04/12/2021     Past Medical History:   Diagnosis Date    Aortic ectasia, thoracic     Breast cancer 2001    History of blood clots     Hx of radiation therapy          PAST SURGICAL HISTORY  Past Surgical History:   Procedure Laterality Date    BREAST BIOPSY      BREAST LUMPECTOMY Left 2011    CARDIAC SURGERY      CARPAL TUNNEL RELEASE      HYSTERECTOMY      TONSILLECTOMY           FAMILY HISTORY  Family History   Problem Relation Age of Onset    Stroke  Mother     Stroke Brother     Breast cancer Neg Hx          SOCIAL HISTORY  Social History     Socioeconomic History    Marital status:     Number of children: 4   Tobacco Use    Smoking status: Former     Types: Cigarettes    Smokeless tobacco: Never   Vaping Use    Vaping Use: Never used   Substance and Sexual Activity    Alcohol use: No     Comment: caffeine use    Drug use: No    Sexual activity: Defer         ALLERGIES  Prednisone        REVIEW OF SYSTEMS  Review of Systems   Constitutional:  Negative for fever.   HENT:  Negative for sore throat.    Eyes: Negative.    Respiratory:  Negative for cough and shortness of breath.    Cardiovascular:  Negative for chest pain.   Gastrointestinal:  Negative for abdominal pain, diarrhea and vomiting.   Genitourinary:  Negative for dysuria.   Musculoskeletal:  Negative for neck pain.   Skin:  Negative for rash.   Allergic/Immunologic: Negative.    Neurological:  Positive for facial asymmetry, speech difficulty and weakness. Negative for numbness and headaches.   Hematological: Negative.    Psychiatric/Behavioral: Negative.     All other systems reviewed and are negative.           PHYSICAL EXAM  ED Triage Vitals [01/14/24 0029]   Temp Heart Rate Resp BP SpO2   97.6 °F (36.4 °C) 63 16 119/79 96 %      Temp src Heart Rate Source Patient Position BP Location FiO2 (%)   -- Monitor Lying Left arm --       Physical Exam  Constitutional:       General: She is not in acute distress.     Appearance: Normal appearance. She is not ill-appearing or toxic-appearing.   HENT:      Head: Normocephalic and atraumatic.   Eyes:      Extraocular Movements: Extraocular movements intact.      Pupils: Pupils are equal, round, and reactive to light.   Cardiovascular:      Rate and Rhythm: Normal rate and regular rhythm.      Heart sounds: No murmur heard.     No friction rub. No gallop.   Pulmonary:      Effort: Pulmonary effort is normal.      Breath sounds: Normal breath sounds.    Abdominal:      General: Abdomen is flat. There is no distension.      Palpations: Abdomen is soft.      Tenderness: There is no abdominal tenderness.   Musculoskeletal:         General: No swelling or tenderness. Normal range of motion.      Cervical back: Normal range of motion and neck supple.   Skin:     General: Skin is warm and dry.   Neurological:      General: No focal deficit present.      Mental Status: She is alert and oriented to person, place, and time.      Sensory: No sensory deficit.      Motor: No weakness.      Comments: [facility] for full assessment   Psychiatric:         Mood and Affect: Mood normal.         Behavior: Behavior normal.       NIH Stroke Scale      Interval: Baseline  Time: 06:55 EST  Person Administering Scale: Jacinto Monreal MD    Administer stroke scale items in the order listed. Record performance in each category after each subscale exam. Do not go back and change scores. Follow directions provided for each exam technique. Scores should reflect what the patient does, not what the clinician thinks the patient can do. The clinician should record answers while administering the exam and work quickly. Except where indicated, the patient should not be coached (i.e., repeated requests to patient to make a special effort).      1a  Level of consciousness: 0=alert; keenly responsive   1b. LOC questions:  0=Performs both tasks correctly   1c. LOC commands: 0=Answers both questions correctly   2.  Best Gaze: 0=normal   3.  Visual: 0=No visual loss   4. Facial Palsy: 1=Minor paralysis (flattened nasolabial fold, asymmetric on smiling)   5a.  Motor left arm: 0=No drift, limb holds 90 (or 45) degrees for full 10 seconds   5b.  Motor right arm: 0=No drift, limb holds 90 (or 45) degrees for full 10 seconds   6a. motor left le=No drift, limb holds 90 (or 45) degrees for full 10 seconds   6b  Motor right le=No drift, limb holds 90 (or 45) degrees for full 10 seconds   7. Limb Ataxia:  0=Absent   8.  Sensory: 0=Normal; no sensory loss   9. Best Language:  0=No aphasia, normal   10. Dysarthria: 0=Normal   11. Extinction and Inattention: 0=No abnormality   12. Distal motor function: 0=Normal    Total:   1         Vital signs and nursing notes reviewed.          LAB RESULTS  Recent Results (from the past 24 hour(s))   POC Glucose Once    Collection Time: 01/14/24 12:27 AM    Specimen: Blood   Result Value Ref Range    Glucose 142 (H) 70 - 130 mg/dL   Protime-INR    Collection Time: 01/14/24 12:29 AM    Specimen: Blood   Result Value Ref Range    Protime 15.6 (H) 11.7 - 14.2 Seconds    INR 1.23 (H) 0.90 - 1.10   aPTT    Collection Time: 01/14/24 12:29 AM    Specimen: Blood   Result Value Ref Range    PTT 29.7 22.7 - 35.4 seconds   Type & Screen    Collection Time: 01/14/24 12:29 AM    Specimen: Blood   Result Value Ref Range    ABO Type O     RH type Positive     Antibody Screen Negative     T&S Expiration Date 1/17/2024 11:59:59 PM    CBC Auto Differential    Collection Time: 01/14/24 12:29 AM    Specimen: Blood   Result Value Ref Range    WBC 10.91 (H) 3.40 - 10.80 10*3/mm3    RBC 3.40 (L) 3.77 - 5.28 10*6/mm3    Hemoglobin 9.6 (L) 12.0 - 15.9 g/dL    Hematocrit 29.5 (L) 34.0 - 46.6 %    MCV 86.8 79.0 - 97.0 fL    MCH 28.2 26.6 - 33.0 pg    MCHC 32.5 31.5 - 35.7 g/dL    RDW 15.7 (H) 12.3 - 15.4 %    RDW-SD 50.1 37.0 - 54.0 fl    MPV 9.8 6.0 - 12.0 fL    Platelets 292 140 - 450 10*3/mm3    Neutrophil % 72.7 42.7 - 76.0 %    Lymphocyte % 19.2 (L) 19.6 - 45.3 %    Monocyte % 5.2 5.0 - 12.0 %    Eosinophil % 2.3 0.3 - 6.2 %    Basophil % 0.4 0.0 - 1.5 %    Immature Grans % 0.2 0.0 - 0.5 %    Neutrophils, Absolute 7.93 (H) 1.70 - 7.00 10*3/mm3    Lymphocytes, Absolute 2.10 0.70 - 3.10 10*3/mm3    Monocytes, Absolute 0.57 0.10 - 0.90 10*3/mm3    Eosinophils, Absolute 0.25 0.00 - 0.40 10*3/mm3    Basophils, Absolute 0.04 0.00 - 0.20 10*3/mm3    Immature Grans, Absolute 0.02 0.00 - 0.05 10*3/mm3    nRBC 0.0  0.0 - 0.2 /100 WBC   ECG 12 Lead Stroke Evaluation    Collection Time: 01/14/24 12:39 AM   Result Value Ref Range    QT Interval 531 ms    QTC Interval 540 ms   Comprehensive Metabolic Panel    Collection Time: 01/14/24  1:30 AM    Specimen: Arm, Right; Blood   Result Value Ref Range    Glucose 108 (H) 65 - 99 mg/dL    BUN 27 (H) 8 - 23 mg/dL    Creatinine 1.53 (H) 0.57 - 1.00 mg/dL    Sodium 142 136 - 145 mmol/L    Potassium 3.6 3.5 - 5.2 mmol/L    Chloride 105 98 - 107 mmol/L    CO2 24.5 22.0 - 29.0 mmol/L    Calcium 8.4 (L) 8.6 - 10.5 mg/dL    Total Protein 5.6 (L) 6.0 - 8.5 g/dL    Albumin 2.9 (L) 3.5 - 5.2 g/dL    ALT (SGPT) 7 1 - 33 U/L    AST (SGOT) 11 1 - 32 U/L    Alkaline Phosphatase 60 39 - 117 U/L    Total Bilirubin 0.2 0.0 - 1.2 mg/dL    Globulin 2.7 gm/dL    A/G Ratio 1.1 g/dL    BUN/Creatinine Ratio 17.6 7.0 - 25.0    Anion Gap 12.5 5.0 - 15.0 mmol/L    eGFR 32.4 (L) >60.0 mL/min/1.73   High Sensitivity Troponin T    Collection Time: 01/14/24  1:30 AM    Specimen: Arm, Right; Blood   Result Value Ref Range    HS Troponin T 73 (C) <14 ng/L   High Sensitivity Troponin T 2Hr    Collection Time: 01/14/24  3:34 AM    Specimen: Blood   Result Value Ref Range    HS Troponin T 69 (C) <14 ng/L    Troponin T Delta -4 (L) >=-4 - <+4 ng/L   POC Glucose Once    Collection Time: 01/14/24  6:25 AM    Specimen: Blood   Result Value Ref Range    Glucose 123 70 - 130 mg/dL       Ordered the above labs and reviewed the results.        RADIOLOGY  CT Head Without Contrast    Result Date: 1/14/2024  Patient: ABDOULAYE ROGERS  Time Out: 01:28 Exam(s): CT HEAD Without Contrast EXAM:   CT Head Without Intravenous Contrast CLINICAL HISTORY:    Reason for exam: Transient ischemic attack (TIA). TECHNIQUE:   Axial computed tomography images of the head brain without intravenous contrast.  CTDI is 49.37 mGy and DLP is 943.44 mGy-cm.  This CT exam was performed according to the principle of ALARA (As Low As Reasonably Achievable) by  using one or more of the following dose reduction techniques: automated exposure control, adjustment of the mA and or kV according to patient size, and or use of iterative reconstruction technique. COMPARISON:   CT head on 4 12 2021 FINDINGS:   Brain: No acute infarct or hemorrhage identified. No extra-axial fluid collection. No mass effect or midline shift. Scattered areas of hypoattenuation in the supratentorial white matter likely represent chronic small vessel ischemic changes.  Stable encephalomalacia in the right basal ganglia and caudate head, left basal ganglia, and left parietal lobe.   Ventricles and sulci: Prominence of the ventricles and sulci is likely secondary to cerebral volume loss.   Bones: Hyperostosis frontalis interna. No bony lesion or acute fracture.   Subcutaneous tissues: Normal.   Sinuses: Normal. No air-fluid levels or mucosal thickening.   Mastoid air cells: Fluid throughout the right mastoid air cells.  Small amount of fluid in the left mastoid air cells.   Orbits: Bilateral lens implants.   Other: Atherosclerotic calcifications in the intracranial vasculature. IMPRESSION:   1.  No acute intracranial abnormality.  If there is persistent concern for acute process, consider further evaluation with MRI. 2.  Chronic small vessel ischemic changes and cerebral volume loss.  Stable encephalomalacia in the right basal ganglia and caudate head, left basal ganglia, and left parietal lobe.     Electronically signed by Kendrick Varela M.D. on 01-14-24 at 0128     Ordered the above noted radiological studies. Reviewed by me in PACS.            PROCEDURES  Procedures    EKG independently interpreted by myself as follows:    EKG          EKG time: 0039  Rhythm/Rate: NSR, 62  P waves and KS: nml  QRS, axis: nml, nml   ST and T waves: Normal ST segments, nonspecific T wave flattening    Interpreted Contemporaneously by me, independently viewed  unchanged compared to prior 7/31/23            MEDICATIONS  GIVEN IN ER  Medications   sodium chloride 0.9 % flush 10 mL (has no administration in time range)   sodium chloride 0.9 % flush 10 mL (has no administration in time range)   sodium chloride 0.9 % flush 10 mL (has no administration in time range)   sodium chloride 0.9 % infusion 40 mL (has no administration in time range)   sennosides-docusate (PERICOLACE) 8.6-50 MG per tablet 2 tablet (has no administration in time range)     And   polyethylene glycol (MIRALAX) packet 17 g (has no administration in time range)     And   bisacodyl (DULCOLAX) EC tablet 5 mg (has no administration in time range)     And   bisacodyl (DULCOLAX) suppository 10 mg (has no administration in time range)   atorvastatin (LIPITOR) tablet 80 mg (has no administration in time range)   nitroglycerin (NITROSTAT) SL tablet 0.4 mg (has no administration in time range)   sodium chloride 0.9 % infusion (has no administration in time range)   aspirin tablet 325 mg (has no administration in time range)     Or   aspirin suppository 300 mg (has no administration in time range)   acetaminophen (TYLENOL) tablet 650 mg (has no administration in time range)     Or   acetaminophen (TYLENOL) 160 MG/5ML oral solution 650 mg (has no administration in time range)     Or   acetaminophen (TYLENOL) suppository 650 mg (has no administration in time range)   ondansetron (ZOFRAN) injection 4 mg (has no administration in time range)                   MEDICAL DECISION MAKING, PROGRESS, and CONSULTS    All labs have been independently reviewed by me.  All radiology studies have been reviewed by me and I have also reviewed the radiology report.   EKG's independently viewed and interpreted by me.  Discussion below represents my analysis of pertinent findings related to patient's condition, differential diagnosis, treatment plan and final disposition.      Additional sources:  - Discussed/ obtained information from independent historians: History obtained from the patient  herself at bedside as well as the family/EMS report.    - External (non-ED) record review: Upon medical records review, the patient was last seen and evaluated in the outpatient office of cardiology on 1/9/2024 in follow-up for her known coronary artery disease as well as hypertension.    - Chronic or social conditions impacting care: Hypertension, CAD    - Shared decision making: Admission decision based on shared conversations have between myself, the patient and family at bedside, as well as ESAU Lopez for [facility].      Orders placed during this visit:  Orders Placed This Encounter   Procedures    CT Head Without Contrast    MRI Brain Without Contrast    Comprehensive Metabolic Panel    Protime-INR    aPTT    High Sensitivity Troponin T    CBC Auto Differential    High Sensitivity Troponin T 2Hr    Hemoglobin A1c    Lipid Panel    Basic Metabolic Panel    CBC Auto Differential    Protime-INR    NPO Diet NPO Type: Strict NPO    Vital Signs    Intake & Output    Weigh Patient    Vital Signs    Telemetry - Place Orders & Notify Provider of Results When Patient Experiences Acute Chest Pain, Dysrhythmia or Respiratory Distress    Notify Provider    Nursing Dysphagia Screening (Complete Prior to Giving Anything By Mouth)    RN to Place Order SLP Consult - Eval & Treat Choosing Reason of RN Dysphagia Screen Failed    Nurse to Call MD or Nutrition Services for Diet if Patient Passes Dysphagia Screen    Intake and Output    Neuro Checks    NIHSS Assessment    Place Sequential Compression Device    Maintain Sequential Compression Device    Telemetry - Maintain IV Access    Telemetry - Place Orders & Notify Provider of Results When Patient Experiences Acute Chest Pain, Dysrhythmia or Respiratory Distress    May Be Off Telemetry for Tests    Pulse Oximetry, Continuous    Up With Assistance    Activity As Tolerated    Code Status and Medical Interventions:    [facility] (on-call MD unless specified) Details    Notify Stroke  Coordinator    Inpatient Rehab Admission Consult    Inpatient Case Management  Consult    Inpatient Diabetes Educator Consult    Inpatient Neurology Consult Stroke    OT Consult: Eval & Treat    PT Consult: Eval & Treat As Tolerated    SLP Consult: Eval & Treat Communication Disorder    POC Glucose Once    POC Glucose Q6H    POC Glucose Once    ECG 12 Lead Stroke Evaluation    Type & Screen    Insert Peripheral IV    Insert Peripheral IV    Inpatient Admission    CBC & Differential           Differential diagnosis includes but is not limited to:    Differential diagnosis includes but is not limited to TIA, acute CVA, intracranial hemorrhage, intracranial mass effect, cardiac rhythm disturbance, acute coronary syndrome, acute anemia, or severe electrolyte disturbance.      Independent interpretation of labs, radiology studies, and discussions with consultants:    Head CT was independently interpreted by myself with my interpretation showing no area of acute ischemia/infarct, hemorrhage, or mass effect.        ED Course as of 01/14/24 0655   Sun Jan 14, 2024   0229 On reevaluation, the patient is resting comfortably and without acute complaint.  Her neurological exam remains essentially unchanged and may be with a slight left lower facial droop.  That is her current only neurologic finding.  The family is present and reports that her exam has drastically improved when compared to when I called EMS.  I informed them that I do believe she has had a TIA and we would be admitting her to the hospital today for further neurologic evaluation.  They are in agreement with that plan and all questions have been answered. [BM]   0244 The patient's presentation, workup, as well as diagnosis and treatment plan was discussed at length with ESAU Lopez for [facility].  She agrees to admit the patient to the hospital today for Dr. Navarro. [BM]      ED Course User Index  [BM] Jacinto Monreal MD              DIAGNOSIS  Final diagnoses:   TIA (transient ischemic attack)   Elevated troponin   Chronic kidney disease, unspecified CKD stage         DISPOSITION  ADMISSION    Discussed treatment plan and reason for admission with pt/family and admitting physician.  Pt/family voiced understanding of the plan for admission for further testing/treatment as needed.               Latest Documented Vital Signs:  As of 06:55 EST  BP- (!) 233/115 HR- 79 Temp- 97.5 °F (36.4 °C) (Oral) O2 sat- 95%              --    Please note that portions of this were completed with a voice recognition program.       Note Disclaimer: At [facility], we believe that sharing information builds trust and better relationships. You are receiving this note because you are receiving care at [facility] or recently visited. It is possible you will see health information before a provider has talked with you about it. This kind of information can be easy to misunderstand. To help you fully understand what it means for your health, we urge you to discuss this note with your provider.             Jacinto Monreal MD  01/14/24 4104

## 2024-01-15 ENCOUNTER — APPOINTMENT (OUTPATIENT)
Dept: MRI IMAGING | Facility: HOSPITAL | Age: 89
End: 2024-01-15
Payer: MEDICARE

## 2024-01-15 ENCOUNTER — APPOINTMENT (OUTPATIENT)
Dept: CARDIOLOGY | Facility: HOSPITAL | Age: 89
End: 2024-01-15
Payer: MEDICARE

## 2024-01-15 LAB
ALBUMIN SERPL-MCNC: 3.2 G/DL (ref 3.5–5.2)
ANION GAP SERPL CALCULATED.3IONS-SCNC: 11 MMOL/L (ref 5–15)
AORTIC DIMENSIONLESS INDEX: 0.3 (DI)
BASOPHILS # BLD AUTO: 0.05 10*3/MM3 (ref 0–0.2)
BASOPHILS NFR BLD AUTO: 0.6 % (ref 0–1.5)
BH CV ECHO MEAS - AO MAX PG: 37.2 MMHG
BH CV ECHO MEAS - AO MEAN PG: 21 MMHG
BH CV ECHO MEAS - AO V2 MAX: 305 CM/SEC
BH CV ECHO MEAS - AO V2 VTI: 67.6 CM
BH CV ECHO MEAS - AVA(I,D): 1.05 CM2
BH CV ECHO MEAS - EDV(CUBED): 50.7 ML
BH CV ECHO MEAS - EDV(MOD-SP2): 101 ML
BH CV ECHO MEAS - EDV(MOD-SP4): 120 ML
BH CV ECHO MEAS - EF(MOD-BP): 64.7 %
BH CV ECHO MEAS - EF(MOD-SP2): 67.3 %
BH CV ECHO MEAS - EF(MOD-SP4): 61.7 %
BH CV ECHO MEAS - ESV(CUBED): 39.5 ML
BH CV ECHO MEAS - ESV(MOD-SP2): 33 ML
BH CV ECHO MEAS - ESV(MOD-SP4): 46 ML
BH CV ECHO MEAS - FS: 7.9 %
BH CV ECHO MEAS - IVS/LVPW: 1.25 CM
BH CV ECHO MEAS - IVSD: 1.4 CM
BH CV ECHO MEAS - LAT PEAK E' VEL: 7.9 CM/SEC
BH CV ECHO MEAS - LV DIASTOLIC VOL/BSA (35-75): 67.5 CM2
BH CV ECHO MEAS - LV MASS(C)D: 236.6 GRAMS
BH CV ECHO MEAS - LV MAX PG: 4.9 MMHG
BH CV ECHO MEAS - LV MEAN PG: 3 MMHG
BH CV ECHO MEAS - LV SYSTOLIC VOL/BSA (12-30): 25.9 CM2
BH CV ECHO MEAS - LV V1 MAX: 111 CM/SEC
BH CV ECHO MEAS - LV V1 VTI: 22.4 CM
BH CV ECHO MEAS - LVIDD: 3.7 CM
BH CV ECHO MEAS - LVIDS: 3.4 CM
BH CV ECHO MEAS - LVOT AREA: 3.2 CM2
BH CV ECHO MEAS - LVOT DIAM: 2.01 CM
BH CV ECHO MEAS - LVPWD: 1.44 CM
BH CV ECHO MEAS - MED PEAK E' VEL: 7 CM/SEC
BH CV ECHO MEAS - MV A MAX VEL: 89.8 CM/SEC
BH CV ECHO MEAS - MV DEC SLOPE: 470.4 CM/SEC2
BH CV ECHO MEAS - MV DEC TIME: 0.19 SEC
BH CV ECHO MEAS - MV E MAX VEL: 117 CM/SEC
BH CV ECHO MEAS - MV E/A: 1.3
BH CV ECHO MEAS - MV MAX PG: 6.8 MMHG
BH CV ECHO MEAS - MV MEAN PG: 2.9 MMHG
BH CV ECHO MEAS - MV P1/2T: 82.9 MSEC
BH CV ECHO MEAS - MV V2 VTI: 41.9 CM
BH CV ECHO MEAS - MVA(P1/2T): 2.7 CM2
BH CV ECHO MEAS - MVA(VTI): 1.69 CM2
BH CV ECHO MEAS - PA ACC TIME: 0.1 SEC
BH CV ECHO MEAS - PA V2 MAX: 101 CM/SEC
BH CV ECHO MEAS - PULM DIAS VEL: 82.8 CM/SEC
BH CV ECHO MEAS - PULM S/D: 0.74
BH CV ECHO MEAS - PULM SYS VEL: 60.9 CM/SEC
BH CV ECHO MEAS - QP/QS: 1.19
BH CV ECHO MEAS - RV MAX PG: 2.09 MMHG
BH CV ECHO MEAS - RV V1 MAX: 72.3 CM/SEC
BH CV ECHO MEAS - RV V1 VTI: 18.7 CM
BH CV ECHO MEAS - RVOT DIAM: 2.4 CM
BH CV ECHO MEAS - SI(MOD-SP2): 38.2 ML/M2
BH CV ECHO MEAS - SI(MOD-SP4): 41.6 ML/M2
BH CV ECHO MEAS - SV(LVOT): 70.8 ML
BH CV ECHO MEAS - SV(MOD-SP2): 68 ML
BH CV ECHO MEAS - SV(MOD-SP4): 74 ML
BH CV ECHO MEAS - SV(RVOT): 84.5 ML
BH CV ECHO MEASUREMENTS AVERAGE E/E' RATIO: 15.7
BH CV XLRA - RV BASE: 3.4 CM
BH CV XLRA - RV LENGTH: 7.8 CM
BH CV XLRA - RV MID: 2.9 CM
BH CV XLRA - TDI S': 10.4 CM/SEC
BUN SERPL-MCNC: 21 MG/DL (ref 8–23)
BUN/CREAT SERPL: 13.5 (ref 7–25)
CALCIUM SPEC-SCNC: 9.2 MG/DL (ref 8.6–10.5)
CHLORIDE SERPL-SCNC: 105 MMOL/L (ref 98–107)
CO2 SERPL-SCNC: 25 MMOL/L (ref 22–29)
CREAT SERPL-MCNC: 1.56 MG/DL (ref 0.57–1)
DEPRECATED RDW RBC AUTO: 48.7 FL (ref 37–54)
EGFRCR SERPLBLD CKD-EPI 2021: 31.6 ML/MIN/1.73
EOSINOPHIL # BLD AUTO: 0.29 10*3/MM3 (ref 0–0.4)
EOSINOPHIL NFR BLD AUTO: 3.7 % (ref 0.3–6.2)
ERYTHROCYTE [DISTWIDTH] IN BLOOD BY AUTOMATED COUNT: 15.5 % (ref 12.3–15.4)
GLUCOSE SERPL-MCNC: 94 MG/DL (ref 65–99)
HCT VFR BLD AUTO: 27.9 % (ref 34–46.6)
HGB BLD-MCNC: 9 G/DL (ref 12–15.9)
IMM GRANULOCYTES # BLD AUTO: 0.02 10*3/MM3 (ref 0–0.05)
IMM GRANULOCYTES NFR BLD AUTO: 0.3 % (ref 0–0.5)
LEFT ATRIUM VOLUME INDEX: 41.3 ML/M2
LYMPHOCYTES # BLD AUTO: 2.36 10*3/MM3 (ref 0.7–3.1)
LYMPHOCYTES NFR BLD AUTO: 30.2 % (ref 19.6–45.3)
MAGNESIUM SERPL-MCNC: 2 MG/DL (ref 1.6–2.4)
MCH RBC QN AUTO: 28.1 PG (ref 26.6–33)
MCHC RBC AUTO-ENTMCNC: 32.3 G/DL (ref 31.5–35.7)
MCV RBC AUTO: 87.2 FL (ref 79–97)
MONOCYTES # BLD AUTO: 0.55 10*3/MM3 (ref 0.1–0.9)
MONOCYTES NFR BLD AUTO: 7 % (ref 5–12)
NEUTROPHILS NFR BLD AUTO: 4.54 10*3/MM3 (ref 1.7–7)
NEUTROPHILS NFR BLD AUTO: 58.2 % (ref 42.7–76)
NRBC BLD AUTO-RTO: 0 /100 WBC (ref 0–0.2)
PHOSPHATE SERPL-MCNC: 3.3 MG/DL (ref 2.5–4.5)
PLATELET # BLD AUTO: 262 10*3/MM3 (ref 140–450)
PMV BLD AUTO: 10.2 FL (ref 6–12)
POTASSIUM SERPL-SCNC: 3.7 MMOL/L (ref 3.5–5.2)
RBC # BLD AUTO: 3.2 10*6/MM3 (ref 3.77–5.28)
SINUS: 3.2 CM
SODIUM SERPL-SCNC: 141 MMOL/L (ref 136–145)
URATE SERPL-MCNC: 3.1 MG/DL (ref 2.4–5.7)
WBC NRBC COR # BLD AUTO: 7.81 10*3/MM3 (ref 3.4–10.8)

## 2024-01-15 PROCEDURE — 93306 TTE W/DOPPLER COMPLETE: CPT | Performed by: INTERNAL MEDICINE

## 2024-01-15 PROCEDURE — 25510000001 PERFLUTREN (DEFINITY) 8.476 MG IN SODIUM CHLORIDE (PF) 0.9 % 10 ML INJECTION

## 2024-01-15 PROCEDURE — 97110 THERAPEUTIC EXERCISES: CPT

## 2024-01-15 PROCEDURE — 99233 SBSQ HOSP IP/OBS HIGH 50: CPT | Performed by: NURSE PRACTITIONER

## 2024-01-15 PROCEDURE — 93306 TTE W/DOPPLER COMPLETE: CPT

## 2024-01-15 PROCEDURE — 0 GADOBENATE DIMEGLUMINE 529 MG/ML SOLUTION: Performed by: HOSPITALIST

## 2024-01-15 PROCEDURE — 83735 ASSAY OF MAGNESIUM: CPT | Performed by: HOSPITALIST

## 2024-01-15 PROCEDURE — 25810000003 SODIUM CHLORIDE 0.9 % SOLUTION: Performed by: HOSPITALIST

## 2024-01-15 PROCEDURE — 97530 THERAPEUTIC ACTIVITIES: CPT

## 2024-01-15 PROCEDURE — 70549 MR ANGIOGRAPH NECK W/O&W/DYE: CPT

## 2024-01-15 PROCEDURE — 84550 ASSAY OF BLOOD/URIC ACID: CPT | Performed by: HOSPITALIST

## 2024-01-15 PROCEDURE — A9577 INJ MULTIHANCE: HCPCS | Performed by: HOSPITALIST

## 2024-01-15 PROCEDURE — 25010000002 HYDRALAZINE PER 20 MG

## 2024-01-15 PROCEDURE — 70544 MR ANGIOGRAPHY HEAD W/O DYE: CPT

## 2024-01-15 PROCEDURE — 97535 SELF CARE MNGMENT TRAINING: CPT

## 2024-01-15 PROCEDURE — 99222 1ST HOSP IP/OBS MODERATE 55: CPT

## 2024-01-15 PROCEDURE — 85025 COMPLETE CBC W/AUTO DIFF WBC: CPT | Performed by: HOSPITALIST

## 2024-01-15 PROCEDURE — 80069 RENAL FUNCTION PANEL: CPT | Performed by: HOSPITALIST

## 2024-01-15 RX ORDER — ASPIRIN 81 MG/1
81 TABLET ORAL DAILY
Status: DISCONTINUED | OUTPATIENT
Start: 2024-01-16 | End: 2024-01-19 | Stop reason: HOSPADM

## 2024-01-15 RX ORDER — HYDRALAZINE HYDROCHLORIDE 20 MG/ML
10 INJECTION INTRAMUSCULAR; INTRAVENOUS EVERY 6 HOURS PRN
Status: DISCONTINUED | OUTPATIENT
Start: 2024-01-15 | End: 2024-01-19 | Stop reason: HOSPADM

## 2024-01-15 RX ORDER — CLOPIDOGREL BISULFATE 75 MG/1
75 TABLET ORAL DAILY
Status: DISCONTINUED | OUTPATIENT
Start: 2024-01-15 | End: 2024-01-19 | Stop reason: HOSPADM

## 2024-01-15 RX ORDER — HYDRALAZINE HYDROCHLORIDE 20 MG/ML
10 INJECTION INTRAMUSCULAR; INTRAVENOUS ONCE
Status: COMPLETED | OUTPATIENT
Start: 2024-01-15 | End: 2024-01-15

## 2024-01-15 RX ORDER — SPIRONOLACTONE 25 MG/1
25 TABLET ORAL DAILY
Status: DISCONTINUED | OUTPATIENT
Start: 2024-01-15 | End: 2024-01-19 | Stop reason: HOSPADM

## 2024-01-15 RX ORDER — AMLODIPINE BESYLATE 5 MG/1
5 TABLET ORAL ONCE
Status: COMPLETED | OUTPATIENT
Start: 2024-01-15 | End: 2024-01-15

## 2024-01-15 RX ORDER — CARVEDILOL 25 MG/1
25 TABLET ORAL 2 TIMES DAILY
Status: DISCONTINUED | OUTPATIENT
Start: 2024-01-15 | End: 2024-01-19 | Stop reason: HOSPADM

## 2024-01-15 RX ADMIN — Medication 1 TABLET: at 08:21

## 2024-01-15 RX ADMIN — ASPIRIN 325 MG: 325 TABLET ORAL at 08:21

## 2024-01-15 RX ADMIN — CLOPIDOGREL BISULFATE 75 MG: 75 TABLET, FILM COATED ORAL at 10:24

## 2024-01-15 RX ADMIN — LORAZEPAM 0.5 MG: 0.5 TABLET ORAL at 20:57

## 2024-01-15 RX ADMIN — AMLODIPINE BESYLATE 5 MG: 5 TABLET ORAL at 19:30

## 2024-01-15 RX ADMIN — PERFLUTREN 2 ML: 6.52 INJECTION, SUSPENSION INTRAVENOUS at 17:26

## 2024-01-15 RX ADMIN — BUSPIRONE HYDROCHLORIDE 10 MG: 10 TABLET ORAL at 20:33

## 2024-01-15 RX ADMIN — DOCUSATE SODIUM 50MG AND SENNOSIDES 8.6MG 2 TABLET: 8.6; 5 TABLET, FILM COATED ORAL at 20:34

## 2024-01-15 RX ADMIN — SODIUM CHLORIDE 100 ML/HR: 9 INJECTION, SOLUTION INTRAVENOUS at 00:29

## 2024-01-15 RX ADMIN — DOCUSATE SODIUM 50MG AND SENNOSIDES 8.6MG 2 TABLET: 8.6; 5 TABLET, FILM COATED ORAL at 08:21

## 2024-01-15 RX ADMIN — ALLOPURINOL 300 MG: 300 TABLET ORAL at 08:21

## 2024-01-15 RX ADMIN — Medication 10 ML: at 08:21

## 2024-01-15 RX ADMIN — ATORVASTATIN CALCIUM 80 MG: 80 TABLET, FILM COATED ORAL at 20:33

## 2024-01-15 RX ADMIN — SPIRONOLACTONE 25 MG: 25 TABLET ORAL at 10:24

## 2024-01-15 RX ADMIN — OXYCODONE HYDROCHLORIDE AND ACETAMINOPHEN 500 MG: 500 TABLET ORAL at 08:21

## 2024-01-15 RX ADMIN — BUSPIRONE HYDROCHLORIDE 10 MG: 10 TABLET ORAL at 08:21

## 2024-01-15 RX ADMIN — Medication 10 ML: at 20:36

## 2024-01-15 RX ADMIN — HYDRALAZINE HYDROCHLORIDE 10 MG: 20 INJECTION INTRAMUSCULAR; INTRAVENOUS at 19:12

## 2024-01-15 RX ADMIN — HYDRALAZINE HYDROCHLORIDE 10 MG: 20 INJECTION INTRAMUSCULAR; INTRAVENOUS at 18:46

## 2024-01-15 RX ADMIN — CARVEDILOL 12.5 MG: 12.5 TABLET, FILM COATED ORAL at 08:21

## 2024-01-15 RX ADMIN — GADOBENATE DIMEGLUMINE 14 ML: 529 INJECTION, SOLUTION INTRAVENOUS at 11:37

## 2024-01-15 RX ADMIN — CARVEDILOL 25 MG: 25 TABLET, FILM COATED ORAL at 20:35

## 2024-01-15 RX ADMIN — VENLAFAXINE HYDROCHLORIDE 75 MG: 75 CAPSULE, EXTENDED RELEASE ORAL at 08:21

## 2024-01-15 NOTE — THERAPY TREATMENT NOTE
Patient Name: Antonette King  : 1934    MRN: [id number]                              Today's Date: 1/15/2024       Admit Date: 2024    Visit Dx:     ICD-10-CM ICD-9-CM   1. TIA (transient ischemic attack)  G45.9 435.9   2. Elevated troponin  R79.89 790.6   3. 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    Hyperlipidemia    Thoracic aortic ectasia    Chest pain    Acute renal failure superimposed on stage 3a chronic kidney disease    Hypertensive urgency    Asymptomatic hypertensive urgency    CAD (coronary artery disease)    S/P CABG (coronary artery bypass graft)    TIA (transient ischemic attack)     Past Medical History:   Diagnosis Date    Aortic ectasia, thoracic     Breast cancer     left lumpectomy    CAD (coronary artery disease)     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 2021    History of blood clots     Hx of radiation therapy     , left breast ca    Hyperlipidemia 2021    Hypertension 2021    S/P CABG (coronary artery bypass graft) 10/12/2022     Past Surgical History:   Procedure Laterality Date    BREAST BIOPSY      BREAST LUMPECTOMY Left 2011    CARDIAC SURGERY      CARPAL TUNNEL RELEASE      HYSTERECTOMY      TONSILLECTOMY        General Information       Row Name 01/15/24 1418          Physical Therapy Time and Intention    Document Type therapy note (daily note)  -MG     Mode of Treatment individual therapy;physical therapy  -MG       Row Name 01/15/24 1418          General Information    Patient Profile Reviewed yes  -MG     Existing Precautions/Restrictions fall  -MG     Barriers to Rehab hearing deficit  [place]. L ear better ear.  -MG       Row Name 01/15/24 1418          Cognition    Orientation Status (Cognition) oriented x 4  -MG       Row Name 01/15/24 1418        "   Safety Issues, Functional Mobility    Safety Issues Affecting Function (Mobility) insight into deficits/self-awareness;safety precaution awareness  -MG     Impairments Affecting Function (Mobility) endurance/activity tolerance;strength;balance  -MG     Comment, Safety Issues/Impairments (Mobility) Gait belt and non-skid socks donned.  -MG               User Key  (r) = Recorded By, (t) = Taken By, (c) = Cosigned By      Initials Name Provider Type    MG Alta Willis, PT Physical Therapist                   Mobility       Row Name 01/15/24 1419          Bed Mobility    Bed Mobility scooting/bridging;supine-sit;sit-supine  -MG     Scooting/Bridging [place] (Bed Mobility) standby assist;verbal cues  -MG     Supine-Sit [place] (Bed Mobility) standby assist  -MG     Sit-Supine [place] (Bed Mobility) standby assist  -MG     Assistive Device (Bed Mobility) head of bed elevated  -MG     Comment, (Bed Mobility) No dizziness on sitting. End of session pt able to perform bridge and scoot herself UIB.  -MG       Row Name 01/15/24 1419          Sit-Stand Transfer    Sit-Stand [place] (Transfers) contact guard;verbal cues  -MG     Assistive Device (Sit-Stand Transfers) other (see comments)  No AD  -MG     Comment, (Sit-Stand Transfer) No dizziness on standing. Mild postural sway.  -MG       Row Name 01/15/24 1419          Gait/Stairs (Locomotion)    [place] Level (Gait) contact guard  -MG     Assistive Device (Gait) other (see comments)  No AD for 50', then L HHA for 50'.  -MG     Distance in Feet (Gait) 100  -MG     Deviations/Abnormal Patterns (Gait) base of support, narrow;[place] decreased;gait speed decreased;stride length decreased  -MG     Bilateral Gait Deviations forward flexed posture;heel strike decreased  -MG     Comment, (Gait/Stairs) No AD for 50', then L HHA for 50'. Reports feeling \"woozy\" around 50' and required L HHA. No LOB or c/o SOB. On return to room further questioned about " "feeling of \"woozy\" w/ pt stating its not dizziness, lightheadedness or feeling like shes going to pass out, more so that her legs are weak and she is tired.  -MG               User Key  (r) = Recorded By, (t) = Taken By, (c) = Cosigned By      Initials Name Provider Type    Alta Downs PT Physical Therapist                   Obj/Interventions       Row Name 01/15/24 1422          Motor Skills    Therapeutic Exercise other (see comments)  AP, LAQ, HF x15 w/ increased time and cueing for full ROM.  -MG       Row Name 01/15/24 1422          Balance    Static Sitting Balance standby assist  -MG     Dynamic Sitting Balance standby assist  -MG     Position, Sitting Balance sitting edge of bed  -MG     Static Standing Balance contact guard  -MG     Dynamic Standing Balance contact guard  -MG     Position/Device Used, Standing Balance unsupported;supported  -MG     Comment, Balance No AD or L HHA for ambulation. Mild postural sway on initial standing. No LOB.  -MG               User Key  (r) = Recorded By, (t) = Taken By, (c) = Cosigned By      Initials Name Provider Type    Alta Downs PT Physical Therapist                   Goals/Plan    No documentation.                  Clinical Impression       Row Name 01/15/24 1422          Pain    Pretreatment Pain Rating 0/10 - no pain  -MG     Posttreatment Pain Rating 0/10 - no pain  -MG     Pain Intervention(s) Medication (See MAR);Ambulation/increased activity;Repositioned;Rest  -MG       Row Name 01/15/24 1422          Plan of Care Review    Plan of Care Reviewed With patient;son  -MG     Progress improving  -MG     Outcome Evaluation Pt seen for  PT tx this PM and tolerated the session well. Pt had BP issues this AM and went down for MRI. Per RN pt had + orthostatics for hypotension. Pts BP at beginning of session read 140/95 () and HR 70bpm. Pt tsf to sitting EOB w/ SBA and declined feeling any dizziness. Pt performed LE ther-ex x15 with cueing for full " "ROM and required increased time to complete. Pt stood w/ CGA, demoing a mild postural sway initially, without use of AD. Pt ambulated 50' into hallway without AD and CGA, then c/o feeling \"woozy\". Pt was then provided L HHA and amb additional 50' back to room. On return to room further questioned about feeling of \"woozy\" w/ pt stating its not dizziness, lightheadedness or feeling like shes going to pass out, more so that her legs are weak and she is tired; feeling dissipated once seated EOB/resting. Pt returned to supine w/ SBA and scooted herself up   in bed to end the session. SBAR w/ RN following session. PT will cont to follow.  -MG       Row Name 01/15/24 1422          Therapy Assessment/Plan (PT)    Rehab Potential (PT) good, to achieve stated therapy goals  -MG     Criteria for Skilled Interventions Met (PT) yes;meets criteria;skilled treatment is necessary  -MG     Therapy Frequency (PT) 5 times/wk  -MG       Row Name 01/15/24 1422          Vital Signs    Pre Systolic BP Rehab 140  -MG     Pre Treatment Diastolic BP 95  -MG     Pretreatment Heart Rate (beats/min) 70  -MG     O2 Delivery Pre Treatment room air  -MG     O2 Delivery Intra Treatment room air  -MG     O2 Delivery Post Treatment room air  -MG     Pre Patient Position Supine  -MG     Intra Patient Position Standing  -MG     Post Patient Position Supine  -MG       Row Name 01/15/24 1422          Positioning and Restraints    Pre-Treatment Position in bed  -MG     Post Treatment Position bed  -MG     In Bed notified nsg;supine;fowlers;call light within reach;encouraged to call for assist;exit alarm on;with family/caregiver;side rails up x3  -MG               User Key  (r) = Recorded By, (t) = Taken By, (c) = Cosigned By      Initials Name Provider Type    MG Alta Willis, PT Physical Therapist                   Outcome Measures       Row Name 01/15/24 1426 01/15/24 0800       How much help from another person do you currently need...    Turning " from your back to your side while in flat bed without using bedrails? 4  -MG 3  -LB    Moving from lying on back to sitting on the side of a flat bed without bedrails? 3  -MG 3  -LB    Moving to and from a bed to a chair (including a wheelchair)? 3  -MG 3  -LB    Standing up from a chair using your arms (e.g., wheelchair, bedside chair)? 3  -MG 3  -LB    Climbing 3-5 steps with a railing? 3  -MG 3  -LB    To walk in hospital room? 3  -MG 3  -LB    AM-PAC 6 Clicks Score (PT) 19  -MG 18  -LB    Highest Level of Mobility Goal 6 --> Walk 10 steps or more  -MG 6 --> Walk 10 steps or more  -LB              User Key  (r) = Recorded By, (t) = Taken By, (c) = Cosigned By      Initials Name Provider Type    Alta Downs, PT Physical Therapist    LB Vernon Eastman, RN Registered Nurse                                 Physical Therapy Education       Title: PT OT SLP Therapies (Done)       Topic: Physical Therapy (Done)       Point: Mobility training (Done)       Learning Progress Summary             Patient Acceptance, E,TB,D, VU,NR by MG at 1/15/2024 1426    Acceptance, E, VU by SHAD at 1/15/2024 0908    Acceptance, E,TB, VU,DU by CHRISTIANE at 1/14/2024 1317                         Point: Home exercise program (Done)       Learning Progress Summary             Patient Acceptance, E,TB,D, VU,NR by MG at 1/15/2024 1426    Acceptance, E, VU by SHAD at 1/15/2024 0908    Acceptance, E,TB, VU,DU by CHRISTIANE at 1/14/2024 1317                         Point: Body mechanics (Done)       Learning Progress Summary             Patient Acceptance, E,TB,D, VU,NR by MG at 1/15/2024 1426    Acceptance, E, VU by SHAD at 1/15/2024 0908    Acceptance, E,TB, VU,DU by CHRISTIANE at 1/14/2024 1317                         Point: Precautions (Done)       Learning Progress Summary             Patient Acceptance, E,TB,D, VU,NR by MG at 1/15/2024 1426    Acceptance, E, VU by SHAD at 1/15/2024 0908    Acceptance, E,TB, VU,DU by CHRISTIANE at 1/14/2024 1317                             "             User Key       Initials Effective Dates Name Provider Type Discipline    MG 05/24/22 -  Alta Willis, PT Physical Therapist PT    CHRISTIANE 05/19/21 -  Alisa Reyes PT Physical Therapist PT    KN 08/02/22 -  Ismael Smith OT Occupational Therapist OT                  PT Recommendation and Plan     Plan of Care Reviewed With: patient, son  Progress: improving  Outcome Evaluation: Pt seen for  PT tx this PM and tolerated the session well. Pt had BP issues this AM and went down for MRI. Per RN pt had + orthostatics for hypotension. Pts BP at beginning of session read 140/95 () and HR 70bpm. Pt tsf to sitting EOB w/ SBA and declined feeling any dizziness. Pt performed LE ther-ex x15 with cueing for full ROM and required increased time to complete. Pt stood w/ CGA, demoing a mild postural sway initially, without use of AD. Pt ambulated 50' into hallway without AD and CGA, then c/o feeling \"woozy\". Pt was then provided L HHA and amb additional 50' back to room. On return to room further questioned about feeling of \"woozy\" w/ pt stating its not dizziness, lightheadedness or feeling like shes going to pass out, more so that her legs are weak and she is tired; feeling dissipated once seated EOB/resting. Pt returned to supine w/ SBA and scooted herself up   in bed to end the session. SBAR w/ RN following session. PT will cont to follow.     Time Calculation:         PT Charges       Row Name 01/15/24 1426 01/15/24 1100          Time Calculation    Start Time 1325  -MG --     Stop Time 1348  -MG --     Time Calculation (min) 23 min  -MG --     PT Received On 01/15/24  -MG --     PT - Next Appointment 01/16/24  -MG 01/16/24  -MG               User Key  (r) = Recorded By, (t) = Taken By, (c) = Cosigned By      Initials Name Provider Type    MG Alta Willis, PT Physical Therapist                  Therapy Charges for Today       Code Description Service Date Service Provider Modifiers Qty    " [id number]  PT THERAPEUTIC ACT EA 15 MIN 1/15/2024 Alta Willis, PT GP 1    11619225201 HC PT THER PROC EA 15 MIN 1/15/2024 Alta Willis, PT GP 1            PT G-Codes  Outcome Measure Options: Modified [place], AM-PAC 6 Clicks Daily Activity (OT)  AM-PAC 6 Clicks Score (PT): 19  AM-PAC 6 Clicks Score (OT): 21  Modified [place] Scale: 2 - Slight disability.  Unable to carry out all previous activities but able to look after own affairs without assistance.  PT Discharge Summary  Anticipated Discharge Disposition (PT): home with assist, home with home health    Alta Willis, PT  1/15/2024

## 2024-01-15 NOTE — PLAN OF CARE
Problem: Adult Inpatient Plan of Care  Goal: Plan of Care Review  Outcome: Ongoing, Progressing  Goal: Patient-Specific Goal (Individualized)  Outcome: Ongoing, Progressing  Goal: Absence of Hospital-Acquired Illness or Injury  Outcome: Ongoing, Progressing  Intervention: Identify and Manage Fall Risk  Recent Flowsheet Documentation  Taken 1/15/2024 1400 by Vernon Eastman RN  Safety Promotion/Fall Prevention:   safety round/check completed   room organization consistent  Taken 1/15/2024 1200 by Vernon Eastman RN  Safety Promotion/Fall Prevention:   safety round/check completed   room organization consistent  Taken 1/15/2024 1000 by Vernon Eastman RN  Safety Promotion/Fall Prevention:   safety round/check completed   room organization consistent  Taken 1/15/2024 0800 by Vernon Eastman RN  Safety Promotion/Fall Prevention:   safety round/check completed   room organization consistent  Intervention: Prevent Skin Injury  Recent Flowsheet Documentation  Taken 1/15/2024 1400 by Vernon Eastman RN  Body Position: position changed independently  Taken 1/15/2024 0800 by Vernon Eastman RN  Body Position: position changed independently  Intervention: Prevent and Manage VTE (Venous Thromboembolism) Risk  Recent Flowsheet Documentation  Taken 1/15/2024 1400 by Vernon Eastman RN  Activity Management: activity encouraged  VTE Prevention/Management: sequential compression devices on  Range of Motion: active ROM (range of motion) encouraged  Taken 1/15/2024 0800 by Vernon Eastman RN  Activity Management: activity encouraged  VTE Prevention/Management:   sequential compression devices off   patient refused intervention  Intervention: Prevent Infection  Recent Flowsheet Documentation  Taken 1/15/2024 1400 by Vernon Eastman RN  Infection Prevention:   single patient room provided   rest/sleep promoted  Taken 1/15/2024 1200 by Vernon Eastman RN  Infection Prevention:   rest/sleep promoted   single patient room provided  Taken  1/15/2024 1000 by Vernon Eastman RN  Infection Prevention:   single patient room provided   rest/sleep promoted  Taken 1/15/2024 0800 by Vernon Eastman RN  Infection Prevention:   single patient room provided   rest/sleep promoted  Goal: Optimal Comfort and Wellbeing  Outcome: Ongoing, Progressing  Intervention: Provide Person-Centered Care  Recent Flowsheet Documentation  Taken 1/15/2024 1400 by Vernon Eastman RN  Trust Relationship/Rapport:   care explained   questions encouraged  Taken 1/15/2024 0800 by Vernon Eastman RN  Trust Relationship/Rapport:   care explained   questions encouraged  Goal: Readiness for Transition of Care  Outcome: Ongoing, Progressing     Problem: Hypertension Comorbidity  Goal: Blood Pressure in Desired Range  Outcome: Ongoing, Progressing  Intervention: Maintain Blood Pressure Management  Recent Flowsheet Documentation  Taken 1/15/2024 1400 by Vernon Eastman RN  Medication Review/Management: medications reviewed  Taken 1/15/2024 1200 by Vernon Eastman RN  Medication Review/Management: medications reviewed  Taken 1/15/2024 1000 by Vernon Eastman RN  Medication Review/Management: medications reviewed  Taken 1/15/2024 0800 by Vernon Eastman RN  Medication Review/Management: medications reviewed     Problem: Fall Injury Risk  Goal: Absence of Fall and Fall-Related Injury  Outcome: Ongoing, Progressing  Intervention: Identify and Manage Contributors  Recent Flowsheet Documentation  Taken 1/15/2024 1400 by Vernon Eastman RN  Medication Review/Management: medications reviewed  Taken 1/15/2024 1200 by Vernon Eastman RN  Medication Review/Management: medications reviewed  Taken 1/15/2024 1000 by Vernon Eastman RN  Medication Review/Management: medications reviewed  Taken 1/15/2024 0800 by Vernon Eastman RN  Medication Review/Management: medications reviewed  Intervention: Promote Injury-Free Environment  Recent Flowsheet Documentation  Taken 1/15/2024 1400 by Vernon Eastman  RN  Safety Promotion/Fall Prevention:   safety round/check completed   room organization consistent  Taken 1/15/2024 1200 by Vernon Eastman RN  Safety Promotion/Fall Prevention:   safety round/check completed   room organization consistent  Taken 1/15/2024 1000 by Vernon Eastman RN  Safety Promotion/Fall Prevention:   safety round/check completed   room organization consistent  Taken 1/15/2024 0800 by Vernon Eastman RN  Safety Promotion/Fall Prevention:   safety round/check completed   room organization consistent     Problem: Infection  Goal: Absence of Infection Signs and Symptoms  Outcome: Ongoing, Progressing   Goal Outcome Evaluation:

## 2024-01-15 NOTE — PLAN OF CARE
Goal Outcome Evaluation:  Plan of Care Reviewed With: patient           Outcome Evaluation: Patient passed RN swallow screen and MRI negative for acute infarct. SLP to sign off.

## 2024-01-15 NOTE — PROGRESS NOTES
"DOS: 1/15/2024  NAME: Antonette King   : 1934  PCP: Tonny Willis MD  Chief Complaint   Patient presents with    Neuro Deficit(s)   Patient seen in follow-up today; new to me        Stroke    Subjective: No acute events overnight.  Patient denies any new complaints or concerns other than persistent generalized weakness.      Son at bedside.    Objective:  Vital signs: BP (!) 185/110   Pulse 75   Temp 97.9 °F (36.6 °C) (Oral)   Resp 18   Ht 162.6 cm (64\")   Wt 70.5 kg (155 lb 6.8 oz)   SpO2 98%   BMI 26.68 kg/m²       HEENT: Normocephalic, atraumatic   COR: RRR  Resp: Even and unlabored  Extremities: Equal pulses, nondistal embolization    Neurological:   MS: AO. Language normal. No neglect. Higher integrative function normal  CN: II-XII normal  Motor: 5/5, normal tone   Reflexes: Toes downgoing  Sensory: Intact  Coordination: Normal    Laboratory results:  Lab Results   Component Value Date    GLUCOSE 94 01/15/2024    CALCIUM 9.2 01/15/2024     01/15/2024    K 3.7 01/15/2024    CO2 25.0 01/15/2024     01/15/2024    BUN 21 01/15/2024    CREATININE 1.56 (H) 01/15/2024    EGFRIFNONA 63 2021    BCR 13.5 01/15/2024    ANIONGAP 11.0 01/15/2024     Lab Results   Component Value Date    WBC 7.81 01/15/2024    HGB 9.0 (L) 01/15/2024    HCT 27.9 (L) 01/15/2024    MCV 87.2 01/15/2024     01/15/2024     Lab Results   Component Value Date    CHOL 204 (H) 2024    CHOL 221 (H) 2023     Lab Results   Component Value Date    HDL 58 2024    HDL 77 (H) 2023     Lab Results   Component Value Date     (H) 2024     (H) 2023     Lab Results   Component Value Date    TRIG 97 2024    TRIG 115 2023         Lab 24  0642   HEMOGLOBIN A1C 5.50      Review and interpretation of imaging:  Imaging discussed below reviewed independently.      Impression: This is a 89-year-old female with past medical history of CAD status post CABG " A&Ox4. VSS. Very infrequent non productive cough noted. Ind in room. Ambulating to bathroom other wise in bed most of the time. Pt likes to lay prone as he feels it help his oxygen levels, hes 90-96% with oxyimizer NC at 7LPM. varies with position. He took off for a few mins and was >88-90% at that time. Denies pain overall. Tolerating diet. Home when able.    (2000)-advised dual oral antiplatelet prior to arrival although patient only on aspirin currently, hypertension, mixed hyperlipidemia who presented to [facility] with acute strokelike symptoms-sudden onset right-sided weakness versus left-sided weakness, facial droop and aphasia for which our service was consulted-symptoms resolved prior to arrival and symptoms have not returned.     Since admission, CT of the head showed chronic right and left lacunar infarcts in the basal ganglia and along with chronic left parietal lobe strokes.  MRI of the brain showed no evidence of acute infarct evidence of remote infarcts as noted above evidence of small vessel ischemic disease noted area of prominent signal loss in the left ICA felt to be most consistent with aneurysm approximately 1.4 cm therefore recommend MRA of the head with contrast to further characterize.  Other recommendations below.PT/OT/ST. CCP to assist with discharge planning. Call RRT for any acute neurological changes and/or concerns. We will continue to follow and advise.       Diagnosis:  1.  Concern for CVA versus TIA with acute onset aphasia/facial droop and unilateral weakness right versus left ?-Resolved-MRI of the brain reassuring  2.  Syncopal episode  3.  CAD status post  4.  Hypertension  5.  Abnormal MRI concerning for aneurysm-recommend MRA head and neck to further evaluate      Plan:  MRI of the head and neck with and without contrast  Aspirin 325 mg daily-on 81 mg daily prior to arrival  Lipitor 80 mg daily-on 40 mg daily prior to arrival-  Neurochecks  BP control  Stroke Education  CHRISTAL/SCDs  PT/OT/ST    Case reviewed with attending neurologist  and he agrees with treatment plan above.    ESAU Steele

## 2024-01-15 NOTE — NURSING NOTE
Orthostatic BP completed:     Done in sequential order 3 minutes apart: Laying, Sitting, Standing, with a repeat standing.     Layin/104 (121) with pulse of 67    Sittin/102 (113) with pulse of 76    Standin/90 (102) with pulse of 82    Repeat Standin/99 (106) with pulse of 80

## 2024-01-15 NOTE — PLAN OF CARE
"Goal Outcome Evaluation:      Patient has not had any acute events overnight at this time.  Patient stated they slept \"very well\" last night without any complications.  NIH 0.  Patient walks with stand by assistance.  Patient had large continent bm this morning.  No complaints of pain or dizziness at this time.  IV fluids continue as ordered.  Will continue to monitor and provide care as appropriate and ordered.  Bed in lowest position, call bell in reach.  Son at bedside.                                        "

## 2024-01-15 NOTE — CASE MANAGEMENT/SOCIAL WORK
Discharge Planning Assessment  [facility]     Patient Name: Antonette King  MRN: [id number]  Today's Date: 1/15/2024    Admit Date: 1/14/2024    Plan: Home w/o needs (lives with granddaughter Hailey Titus) family will provide transportation   Discharge Needs Assessment       Row Name 01/15/24 1139       Living Environment    People in Home other relative(s)    Name(s) of People in Home granddaughter: Hailey Tacho    Current Living Arrangements home    Potentially Unsafe Housing Conditions none    Primary Care Provided by self  minimal assistance from family    Provides Primary Care For no one    Family Caregiver if Needed child(jocelyn), adult;other relative(s)    Family Caregiver Names son: Arturo Lambert / granddaughter: Hailey Titsu    Quality of Family Relationships supportive       Resource/Environmental Concerns    Resource/Environmental Concerns none    Transportation Concerns none  family provides transportation       Transition Planning    Patient/Family Anticipates Transition to home with family    Patient/Family Anticipated Services at Transition none    Transportation Anticipated family or friend will provide       Discharge Needs Assessment    Readmission Within the Last 30 Days no previous admission in last 30 days    Equipment Currently Used at Home walker, rolling    Concerns to be Addressed adjustment to diagnosis/illness;basic needs;discharge planning    Equipment Needed After Discharge none                   Discharge Plan       Row Name 01/15/24 2371       Plan    Plan Comments I met with pt and her son Arturo Lambert ([phone]) who assisted with communication with pt due to her [place], Arturo also called pt's granddaughter Hailey Titus ([phone]) to provide addition information as pt lives with her,  pt lives in a 2 level home with 3 steps to enter, everything pt needs is on the main level of the home, pt requires minimal assistance with ADL, walks w/o device but they encourage use of a  walker. Pt has never had home health or been to a SNF and they plan at discharge is for pt to return to Estelita's home. Bartolomewilia said pt will provide transportation.   Pt uses [facility] in [place], [place].                       Martina Kong RN      Row Name 01/15/24 1129       Plan    Plan Home w/o needs (lives with granddaughter Hailey Titus) family will provide transportation    Patient/Family in Agreement with Plan yes    Provided Post Acute Provider List? Yes    Post Acute Provider List Home Health    Provided Post Acute Provider Quality & Resource List? Yes    Post Acute Provider Quality and Resource List Home Health    Delivered To Support Person    Support Person son: Arturo Lambert    Method of Delivery In person                  Continued Care and Services - Admitted Since 1/14/2024    Coordination has not been started for this encounter.       Expected Discharge Date and Time       Expected Discharge Date Expected Discharge Time    Jan 16, 2024            Demographic Summary       Row Name 01/15/24 1138       General Information    Admission Type inpatient    Arrived From home    Required Notices Provided Important Message from Medicare    Referral Source admission list    Reason for Consult discharge planning    Preferred Language English       Contact Information    Permission Granted to Share Info With family/designee                   Functional Status       Row Name 01/15/24 1139       Functional Status, IADL    Medications assistive person    Meal Preparation completely dependent    Housekeeping completely dependent    Laundry completely dependent    Shopping assistive person               Martina Kong, RN

## 2024-01-15 NOTE — SIGNIFICANT NOTE
01/15/24 1100   OTHER   Discipline physical therapist   Rehab Time/Intention   Session Not Performed other (see comments)  (Checked on pt x3 this AM. 828 - eating breakfast, 910- Per RN BP high and to check on later (185/119), 11 - ROBBIE to MRI. PT may follow up later this afternoon, time permitting, or tomorrow for tx as appropriate.)   Therapy Assessment/Plan (PT)   Criteria for Skilled Interventions Met (PT) yes;meets criteria;skilled treatment is necessary   Recommendation   PT - Next Appointment 01/16/24

## 2024-01-15 NOTE — CONSULTS
Date of Consultation: 01/15/24    Referral Provider: Jacinto Monreal MD     Reason for Consultation: syncope, orthostatic hypotension     Encounter Provider: ESAU Thompson    Group of Service: [facility]     Patient Name: Antonette King    :1934    Chief complaint: confusion, weakness    History of Present Illness:  Antonette King is an 89 year old who follows with Dr. Gonzalez and has a past medical history that is significant for coronary artery disease s/p CABG in  (son thinks it may have been in ), hypertension, hyperlipidemia, and dementia.     She had an abnormal stress test in 2022 which showed a medium-sized, moderately severe area of ischemia located in the inferior wall and lateral wall. She and her family wanted to proceed conservatively, she was started on isosorbide mononitrate 30 mg daily. She was last hospitalized in 2023 at [facility] for elevated blood pressure, low back pain, and chest pain. She was felt to have acute on chronic diastolic heart failure, at discharge her Imdur was increased to 30 mg twice daily. She was seen in the office by Dr. Gonzalez on 24 and her blood pressure was 162/90. She  was transitioned from metoprolol tartrate to carvedilol 12.5 mg twice daily.     She presented to the ED on 24 after her family found her to be confused and weak. There was some question of unilateral weakness, facial droop, and aphasia. These were reported by EMS but had resolved by the time she arrived to the ED. Her family reported that she was sitting on the edge of the bed and felt dizzy, she then slid to the floor and was unable to get herself up. She has had increasing dizziness and weakness since her medications were adjusted on 24.     Today, she denies chest pain or discomfort, palpitations, dizziness or lightheadedness, nausea, vomiting, or diarrhea. She ambulated out to the nurses station and denies dizziness or lightheadedness  with this, she did experience some shortness of breath. Her son is at bedside.     ECHO 8/1/23    Left ventricular systolic function is normal. Left ventricular ejection fraction appears to be 66 - 70%.    Left ventricular wall thickness is consistent with moderate concentric hypertrophy.    Left ventricular diastolic function is consistent with (grade II w/high LAP) pseudonormalization.    Normal right ventricular cavity size and systolic function noted.    : The left atrial cavity is mildly dilated.    Moderate aortic valve stenosis is present. Aortic valve maximum pressure gradient is 35.3 mmHg. Aortic valve mean pressure gradient is 21.9 mmHg    Insufficient TR velocity profile to estimate the right ventricular systolic pressure.    : There is no evidence of pericardial effusion     Stress Test with Myocardial Perfusion 10/19/22    Myocardial perfusion imaging indicates a medium-sized, moderately severe area of ischemia located in the inferior wall and lateral wall.    Left ventricular ejection fraction is hyperdynamic (Calculated EF > 70%). .     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     Current Outpatient Medications on File Prior to Encounter   Medication Sig Dispense Refill     allopurinol (ZYLOPRIM) 300 MG tablet Take 1 tablet by mouth Daily.      ascorbic acid (VITAMIN C) 500 MG tablet Take 1 tablet by mouth Daily.      aspirin 81 MG chewable tablet Chew 1 tablet Daily.      atorvastatin (LIPITOR) 40 MG tablet Take 1 tablet by mouth Every Night. 30 tablet 0    busPIRone (BUSPAR) 10 MG tablet Take 1 tablet by mouth 2 (Two) Times a Day.      carvedilol (COREG) 12.5 MG tablet Take 1 tablet by mouth 2 (Two) Times a Day. 180 tablet 3    clopidogrel (PLAVIX) 75 MG tablet Take 1 tablet by mouth Daily.      hydrALAZINE (APRESOLINE) 25 MG tablet Take 1 tablet by mouth 2 (Two) Times a Day. 90 tablet 0    isosorbide mononitrate (IMDUR) 30 MG 24 hr tablet Take 1 tablet by mouth Daily. 90 tablet 3    losartan (COZAAR) 100 MG tablet Take 1 tablet by mouth Daily.      multivitamin with minerals tablet tablet Take 1 tablet by mouth Daily.      venlafaxine XR (EFFEXOR-XR) 75 MG 24 hr capsule Take 1 capsule by mouth Daily.      vitamin D3 125 MCG (5000 UT) capsule capsule Take 1 capsule by mouth Daily.      LORazepam (ATIVAN) 0.5 MG tablet Take half to 1 tab po bid prn anxiety 10 tablet 0         Social History     Socioeconomic History    Marital status:     Number of children: 4   Tobacco Use    Smoking status: Former     Types: Cigarettes    Smokeless tobacco: Never   Vaping Use    Vaping Use: Never used   Substance and Sexual Activity    Alcohol use: No     Comment: caffeine use    Drug use: No    Sexual activity: Defer         Family History   Problem Relation Age of Onset    Stroke Mother     Stroke Brother     Breast cancer Neg Hx        REVIEW OF SYSTEMS:   12 point ROS was performed and is negative except as outlined in HPI       Objective:     Vitals:    01/14/24 1925 01/14/24 2336 01/15/24 0728 01/15/24 0822   BP: 145/81 153/98 (!) 202/108 (!) 193/101   BP Location: Right arm Right arm Left arm    Patient Position: Lying Lying Lying    Pulse: 77 66 75    Resp: 18 18 18    Temp: 97.7 °F  "(36.5 °C) 97.5 °F (36.4 °C) 97.9 °F (36.6 °C)    TempSrc: Oral Oral Oral    SpO2: 98% 94% 98%    Weight:       Height:         Body mass index is 26.68 kg/m².  Flowsheet Rows      Flowsheet Row First Filed Value   Admission Height 157.5 cm (62\") Documented at 01/14/2024 0029   Admission Weight 73.1 kg (161 lb 2.5 oz) Documented at 01/14/2024 0029          Physical Exam  Vitals reviewed.   Constitutional:       General: She is not in acute distress.  HENT:      Head: Normocephalic.      Nose: Nose normal.   Eyes:      Extraocular Movements: Extraocular movements intact.      Pupils: Pupils are equal, round, and reactive to light.   Cardiovascular:      Rate and Rhythm: Normal rate and regular rhythm.      Pulses: Normal pulses.      Heart sounds: Heart sounds not distant. Murmur heard.      Systolic murmur is present with a grade of 2/6.      No friction rub. No gallop. No S3 or S4 sounds.   Pulmonary:      Effort: Pulmonary effort is normal.      Breath sounds: Normal breath sounds.   Abdominal:      General: Abdomen is flat. Bowel sounds are normal.      Palpations: Abdomen is soft.      Tenderness: There is no abdominal tenderness.   Musculoskeletal:      Right lower leg: No edema.      Left lower leg: No edema.   Skin:     General: Skin is warm and dry.   Neurological:      General: No focal deficit present.      Mental Status: She is alert and oriented to person, place, and time. Mental status is at baseline.   Psychiatric:         Mood and Affect: Mood normal.         Behavior: Behavior normal.         Lab Review:                Results from last 7 days   Lab Units 01/15/24  0526   SODIUM mmol/L 141   POTASSIUM mmol/L 3.7   CHLORIDE mmol/L 105   CO2 mmol/L 25.0   BUN mg/dL 21   CREATININE mg/dL 1.56*   GLUCOSE mg/dL 94   CALCIUM mg/dL 9.2     Results from last 7 days   Lab Units 01/14/24  0334 01/14/24  0130   HSTROP T ng/L 69* 73*     Results from last 7 days   Lab Units 01/15/24  0526   WBC 10*3/mm3 7.81 "   HEMOGLOBIN g/dL 9.0*   HEMATOCRIT % 27.9*   PLATELETS 10*3/mm3 262     Results from last 7 days   Lab Units 01/14/24  0852 01/14/24  0029   INR  1.12* 1.23*   APTT seconds  --  29.7     Results from last 7 days   Lab Units 01/14/24  0642   CHOLESTEROL mg/dL 204*     Results from last 7 days   Lab Units 01/15/24  0526   MAGNESIUM mg/dL 2.0     Results from last 7 days   Lab Units 01/14/24  0642   CHOLESTEROL mg/dL 204*   TRIGLYCERIDES mg/dL 97   HDL CHOL mg/dL 58   LDL CHOL mg/dL 129*       EKG (reviewed by me personally):                  Assessment/Plan:   TIA - MRI of the brain was negative for acute stroke. She is undergoing evaluation with neurology. She underwent MRI angiogram of the head and neck this morning, results are pending.   Hypertension - orthostatic vital signs were quite positive, her systolic pressure dropped 77 mmHg and her diastolic dropped 18 mmHg when going from lying to standing on admission. She was reportedly symptomatic with this as well. At home she is on carvedilol, hydralazine, isosorbide mononitrate, and losartan-hydrochlorothiazide. On admission, her medications were held due to concern for a stroke. This morning her blood pressure is elevated and her carvedilol has been restarted at a higher dose of 25 mg twice daily. Additionally, she has been started on spironolactone at 25 mg daily. Orthostatic vitals remain positive today, I would like to see how she does with these medication changes for today. If additional agents are needed I would consider adding amlodipine.   Moderate aortic valve stenosis - noted on echocardiogram on 8/1/23, will repeat echocardiogram while she's here.   Chronic HFpEF - appears euvolemic on exam today   Chronic kidney disease - creatinine 1.56 today, baseline 1.4-1.5   Coronary artery disease - s/p CABG   Mixed hyperlipidemia   Dementia     Cardiology will follow.     Libby Stoll, APRN   01/15/24

## 2024-01-15 NOTE — PLAN OF CARE
"Goal Outcome Evaluation:  Plan of Care Reviewed With: patient, son        Progress: improving  Outcome Evaluation: Pt seen for  PT tx this PM and tolerated the session well. Pt had BP issues this AM and went down for MRI. Per RN pt had + orthostatics for hypotension. Pts BP at beginning of session read 140/95 () and HR 70bpm. Pt tsf to sitting EOB w/ SBA and declined feeling any dizziness. Pt performed LE ther-ex x15 with cueing for full ROM and required increased time to complete. Pt stood w/ CGA, demoing a mild postural sway initially, without use of AD. Pt ambulated 50' into hallway without AD and CGA, then c/o feeling \"woozy\". Pt was then provided L HHA and amb additional 50' back to room. On return to room further questioned about feeling of \"woozy\" w/ pt stating its not dizziness, lightheadedness or feeling like shes going to pass out, more so that her legs are weak and she is tired; feeling dissipated once seated EOB/resting. Pt returned to supine w/ SBA and scooted herself up   in bed to end the session. SBAR w/ RN following session. PT will cont to follow.      Anticipated Discharge Disposition (PT): home with assist, home with home health                        "

## 2024-01-15 NOTE — PLAN OF CARE
Goal Outcome Evaluation:  Plan of Care Reviewed With: patient, son           Outcome Evaluation: Pt seen today for OT treatment session. Per nursing no out of bed task at this time due to high BP. Pt seated EOB upon arrival to room with son at bed side. Pt agreeable to perform grooming task seated EOB. Pt required RECIO assitance for grooming task including oral care, wash face, and hair care. Pt will cont to benefit from skilled OT services to improve ind. with ADLs.

## 2024-01-15 NOTE — THERAPY TREATMENT NOTE
Patient Name: Antonette King  : 1934    MRN: [id number]                              Today's Date: 1/15/2024       Admit Date: 2024    Visit Dx:     ICD-10-CM ICD-9-CM   1. TIA (transient ischemic attack)  G45.9 435.9   2. Elevated troponin  R79.89 790.6   3. 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    Hyperlipidemia    Thoracic aortic ectasia    Chest pain    Acute renal failure superimposed on stage 3a chronic kidney disease    Hypertensive urgency    Asymptomatic hypertensive urgency    CAD (coronary artery disease)    S/P CABG (coronary artery bypass graft)    TIA (transient ischemic attack)     Past Medical History:   Diagnosis Date    Aortic ectasia, thoracic     Breast cancer     left lumpectomy    CAD (coronary artery disease)     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 2021    History of blood clots     Hx of radiation therapy     , left breast ca    Hyperlipidemia 2021    Hypertension 2021    S/P CABG (coronary artery bypass graft) 10/12/2022     Past Surgical History:   Procedure Laterality Date    BREAST BIOPSY      BREAST LUMPECTOMY Left 2011    CARDIAC SURGERY      CARPAL TUNNEL RELEASE      HYSTERECTOMY      TONSILLECTOMY        General Information       Row Name 01/15/24 0904          OT Time and Intention    Document Type therapy note (daily note)  -KN     Mode of Treatment occupational therapy;individual therapy  -KN       Row Name 01/15/24 0904          General Information    Patient Profile Reviewed yes  -KN     Existing Precautions/Restrictions fall  -KN       Row Name 01/15/24 0904          Cognition    Orientation Status (Cognition) oriented x 4  -KN       Row Name 01/15/24 0904          Safety Issues, Functional Mobility    Impairments Affecting Function  (Mobility) endurance/activity tolerance;strength;balance  -               User Key  (r) = Recorded By, (t) = Taken By, (c) = Cosigned By      Initials Name Provider Type    Ismael Rosenthal OT Occupational Therapist                     Mobility/ADL's       Row Name 01/15/24 0904          Bed Mobility    Comment, (Bed Mobility) pt sitting EOB upon arrival to room  -       Row Name 01/15/24 0904          Transfers    Comment, (Transfers) no out of bed task at this time due to high BP  -       Row Name 01/15/24 0904          Activities of Daily Living    BADL Assessment/Intervention grooming  -hospitals Name 01/15/24 0904          Grooming Assessment/Training    [place] Level (Grooming) grooming skills;hair care, combing/brushing;oral care regimen;wash face, hands  -KN     Position (Grooming) edge of bed sitting  -               User Key  (r) = Recorded By, (t) = Taken By, (c) = Cosigned By      Initials Name Provider Type    Ismael Rosenthal OT Occupational Therapist                   Obj/Interventions    No documentation.                  Goals/Plan    No documentation.                  Clinical Impression       Row Name 01/15/24 0905          Pain Assessment    Pretreatment Pain Rating 0/10 - no pain  -     Posttreatment Pain Rating 0/10 - no pain  -hospitals Name 01/15/24 0905          Plan of Care Review    Plan of Care Reviewed With patient;son  -     Outcome Evaluation Pt seen today for OT treatment session. Per nursing no out of bed task at this time due to high BP. Pt seated EOB upon arrival to room with son at bed side. Pt agreeable to perform grooming task seated EOB. Pt required RECIO assitance for grooming task including oral care, wash face, and hair care. Pt will cont to benefit from skilled OT services to improve ind. with ADLs.  -       Row Name 01/15/24 0905          Positioning and Restraints    Pre-Treatment Position in bed  -KN     Post Treatment Position bed  -KN     In Bed  sitting EOB;call light within reach;encouraged to call for assist;exit alarm on  -KN               User Key  (r) = Recorded By, (t) = Taken By, (c) = Cosigned By      Initials Name Provider Type    Ismael Rosenthal OT Occupational Therapist                   Outcome Measures       Row Name 01/15/24 0908          How much help from another is currently needed...    Putting on and taking off regular lower body clothing? 3  -KN     Bathing (including washing, rinsing, and drying) 3  -KN     Toileting (which includes using toilet bed pan or urinal) 3  -KN     Putting on and taking off regular upper body clothing 4  -KN     Taking care of personal grooming (such as brushing teeth) 4  -KN     Eating meals 4  -KN     AM-PAC 6 Clicks Score (OT) 21  -KN               User Key  (r) = Recorded By, (t) = Taken By, (c) = Cosigned By      Initials Name Provider Type    Ismael Rosenthal OT Occupational Therapist                    Occupational Therapy Education       Title: PT OT SLP Therapies (Done)       Topic: Occupational Therapy (Done)       Point: ADL training (Done)       Description:   Instruct learner(s) on proper safety adaptation and remediation techniques during self care or transfers.   Instruct in proper use of assistive devices.                  Learning Progress Summary             Patient Acceptance, E, VU by SHAD at 1/15/2024 0908    Acceptance, E, VU by ELI at 1/14/2024 1146   Family Acceptance, E, VU by ELI at 1/14/2024 1146                         Point: Home exercise program (Done)       Description:   Instruct learner(s) on appropriate technique for monitoring, assisting and/or progressing therapeutic exercises/activities.                  Learning Progress Summary             Patient Acceptance, E, VU by SHAD at 1/15/2024 0908    Acceptance, E, VU by ELI at 1/14/2024 1146   Family Acceptance, E, VU by ELI at 1/14/2024 1146                         Point: Precautions (Done)       Description:   Instruct learner(s) on  prescribed precautions during self-care and functional transfers.                  Learning Progress Summary             Patient Acceptance, E, VU by KN at 1/15/2024 0908    Acceptance, E, VU by ELI at 1/14/2024 1146   Family Acceptance, E, VU by ELI at 1/14/2024 1146                         Point: Body mechanics (Done)       Description:   Instruct learner(s) on proper positioning and spine alignment during self-care, functional mobility activities and/or exercises.                  Learning Progress Summary             Patient Acceptance, E, VU by SHAD at 1/15/2024 0908    Acceptance, E, VU by ELI at 1/14/2024 1146   Family Acceptance, E, VU by ELI at 1/14/2024 1146                                         User Key       Initials Effective Dates Name Provider Type Discipline    SHAD 08/02/22 -  Ismael Smith OT Occupational Therapist OT    ELI 10/12/23 -  Renea Chandler OT Occupational Therapist OT                  OT Recommendation and Plan     Plan of Care Review  Plan of Care Reviewed With: patient, son  Outcome Evaluation: Pt seen today for OT treatment session. Per nursing no out of bed task at this time due to high BP. Pt seated EOB upon arrival to room with son at bed side. Pt agreeable to perform grooming task seated EOB. Pt required RECIO assitance for grooming task including oral care, wash face, and hair care. Pt will cont to benefit from skilled OT services to improve ind. with ADLs.     Time Calculation:         Time Calculation- OT       Row Name 01/15/24 0908             Time Calculation- OT    OT Start Time 0845  -KN      OT Stop Time 0856  -KN      OT Time Calculation (min) 11 min  -KN      OT Received On 01/15/24  -KN         Timed Charges    34478 - OT Self Care/Mgmt Minutes 11  -KN         Total Minutes    Timed Charges Total Minutes 11  -KN       Total Minutes 11  -KN                User Key  (r) = Recorded By, (t) = Taken By, (c) = Cosigned By      Initials Name Provider Type    Ismael Rosenthal OT  Occupational Therapist                  Therapy Charges for Today       Code Description Service Date Service Provider Modifiers Qty    [id number] HC OT SELF CARE/MGMT/TRAIN EA 15 MIN 1/15/2024 Ismael Smith OT GO 1                 Ismael Smith OT  1/15/2024

## 2024-01-15 NOTE — PROGRESS NOTES
[facility] to [facility] Care    [phone]   LOS: 1 day     Name: Antonette King  Age/Sex: 89 y.o. female  :  1934        PCP: Tonny Willis MD  Chief Complaint   Patient presents with    Neuro Deficit(s)      Subjective   She feels okay today other than her blood pressure is high she is not really having any symptoms of this.  Denies other new issues or complaints at this time  General: No Fever or Chills, Cardiac: No Chest Pain or Palpitations, Resp: No Cough or SOA, GI: No Nausea, Vomiting, or Diarrhea, and Other: No bleeding    allopurinol, 300 mg, Oral, Daily  ascorbic acid, 500 mg, Oral, Daily  aspirin, 325 mg, Oral, Daily   Or  aspirin, 300 mg, Rectal, Daily  atorvastatin, 80 mg, Oral, Nightly  busPIRone, 10 mg, Oral, Q12H  carvedilol, 25 mg, Oral, BID  multivitamin with minerals, 1 tablet, Oral, Daily  senna-docusate sodium, 2 tablet, Oral, BID  sodium chloride, 10 mL, Intravenous, Q12H  spironolactone, 25 mg, Oral, Daily  venlafaxine XR, 75 mg, Oral, Daily           Objective   Vital Signs  Temp:  [97.5 °F (36.4 °C)-97.9 °F (36.6 °C)] 97.9 °F (36.6 °C)  Heart Rate:  [66-84] 75  Resp:  [18] 18  BP: (121-202)/() 185/110  Body mass index is 26.68 kg/m².    Intake/Output Summary (Last 24 hours) at 1/15/2024 0930  Last data filed at 1/15/2024 0800  Gross per 24 hour   Intake 1563.34 ml   Output --   Net 1563.34 ml       Physical Exam  Vitals and nursing note reviewed.   Constitutional:       General: She is not in acute distress.     Appearance: She is obese. She is ill-appearing.   Cardiovascular:      Rate and Rhythm: Normal rate and regular rhythm.   Pulmonary:      Effort: No respiratory distress.      Breath sounds: Normal breath sounds.   Abdominal:      General: Bowel sounds are normal. There is no distension.      Palpations: Abdomen is soft.   Neurological:      General: No focal deficit present.      Mental Status: She is alert. Mental status is at baseline.           Results  Review:       I reviewed the patient's new clinical results.  Results from last 7 days   Lab Units 01/15/24  0526 01/14/24  0642 01/14/24  0029   WBC 10*3/mm3 7.81 10.47 10.91*   HEMOGLOBIN g/dL 9.0* 10.5* 9.6*   PLATELETS 10*3/mm3 262 278 292     Results from last 7 days   Lab Units 01/15/24  0526 01/14/24  0642 01/14/24  0130   SODIUM mmol/L 141 136 142   POTASSIUM mmol/L 3.7 4.6 3.6   CHLORIDE mmol/L 105 98 105   CO2 mmol/L 25.0 20.0* 24.5   BUN mg/dL 21 28* 27*   CREATININE mg/dL 1.56* 1.64* 1.53*   CALCIUM mg/dL 9.2 9.9 8.4*   MAGNESIUM mg/dL 2.0  --   --    PHOSPHORUS mg/dL 3.3  --   --    Estimated Creatinine Clearance: 23.5 mL/min (A) (by C-G formula based on SCr of 1.56 mg/dL (H)).      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       PLAN  This is an 89-year-old lady with a history of hypertension dementia hyperlipidemia coronary artery disease who presents to the hospital with weakness and confusion and was admitted to our service to evaluate for TIA but sounds more consistent with syncope and orthostasis.   -MRI and workup for stroke/TIA negative  -My suspicion is that this is related to orthostatic drops in her blood pressure.  She may have had some convulsive syncope as well.  -She is hypertensive today we will increase her Coreg and add spironolactone.  Will hold on the Imdur and hydralazine for now.  Cardiology's been consulted for input and opinion on her medications and assistance with further workup.  Planning to recheck orthostatics following spironolactone today  -She does have underlying chronic kidney disease with baseline creatinine  around 1.4-1.5.  Creatinine 1.56 this morning.  Continue to monitor electrolytes and avoid nephrotoxic agents.  -Known history of coronary artery disease continue aspirin and Plavix and beta-blocker.  -Mechanical DVT prophylaxis  -Full code      Disposition  Expected discharge date/ time has not been documented.       Milind Romeo MD  [facility]  01/15/24  09:30 EST

## 2024-01-16 PROBLEM — I67.1 CEREBRAL ANEURYSM, NONRUPTURED: Status: ACTIVE | Noted: 2024-01-16

## 2024-01-16 PROBLEM — I67.1 ANEURYSM OF CAVERNOUS PORTION OF LEFT INTERNAL CAROTID ARTERY: Status: ACTIVE | Noted: 2024-01-16

## 2024-01-16 LAB
ANION GAP SERPL CALCULATED.3IONS-SCNC: 9 MMOL/L (ref 5–15)
BASOPHILS # BLD AUTO: 0.04 10*3/MM3 (ref 0–0.2)
BASOPHILS NFR BLD AUTO: 0.5 % (ref 0–1.5)
BUN SERPL-MCNC: 23 MG/DL (ref 8–23)
BUN/CREAT SERPL: 17.6 (ref 7–25)
CALCIUM SPEC-SCNC: 9.4 MG/DL (ref 8.6–10.5)
CHLORIDE SERPL-SCNC: 104 MMOL/L (ref 98–107)
CO2 SERPL-SCNC: 26 MMOL/L (ref 22–29)
CREAT SERPL-MCNC: 1.31 MG/DL (ref 0.57–1)
DEPRECATED RDW RBC AUTO: 46.4 FL (ref 37–54)
EGFRCR SERPLBLD CKD-EPI 2021: 39 ML/MIN/1.73
EOSINOPHIL # BLD AUTO: 0.35 10*3/MM3 (ref 0–0.4)
EOSINOPHIL NFR BLD AUTO: 4.1 % (ref 0.3–6.2)
ERYTHROCYTE [DISTWIDTH] IN BLOOD BY AUTOMATED COUNT: 15.2 % (ref 12.3–15.4)
GLUCOSE SERPL-MCNC: 113 MG/DL (ref 65–99)
HCT VFR BLD AUTO: 27.7 % (ref 34–46.6)
HGB BLD-MCNC: 8.8 G/DL (ref 12–15.9)
IMM GRANULOCYTES # BLD AUTO: 0.04 10*3/MM3 (ref 0–0.05)
IMM GRANULOCYTES NFR BLD AUTO: 0.5 % (ref 0–0.5)
LYMPHOCYTES # BLD AUTO: 1.84 10*3/MM3 (ref 0.7–3.1)
LYMPHOCYTES NFR BLD AUTO: 21.7 % (ref 19.6–45.3)
MCH RBC QN AUTO: 27 PG (ref 26.6–33)
MCHC RBC AUTO-ENTMCNC: 31.8 G/DL (ref 31.5–35.7)
MCV RBC AUTO: 85 FL (ref 79–97)
MONOCYTES # BLD AUTO: 0.55 10*3/MM3 (ref 0.1–0.9)
MONOCYTES NFR BLD AUTO: 6.5 % (ref 5–12)
NEUTROPHILS NFR BLD AUTO: 5.65 10*3/MM3 (ref 1.7–7)
NEUTROPHILS NFR BLD AUTO: 66.7 % (ref 42.7–76)
NRBC BLD AUTO-RTO: 0 /100 WBC (ref 0–0.2)
PLATELET # BLD AUTO: 278 10*3/MM3 (ref 140–450)
PMV BLD AUTO: 9.7 FL (ref 6–12)
POTASSIUM SERPL-SCNC: 3.9 MMOL/L (ref 3.5–5.2)
RBC # BLD AUTO: 3.26 10*6/MM3 (ref 3.77–5.28)
SODIUM SERPL-SCNC: 139 MMOL/L (ref 136–145)
WBC NRBC COR # BLD AUTO: 8.47 10*3/MM3 (ref 3.4–10.8)

## 2024-01-16 PROCEDURE — 80048 BASIC METABOLIC PNL TOTAL CA: CPT | Performed by: HOSPITALIST

## 2024-01-16 PROCEDURE — 99232 SBSQ HOSP IP/OBS MODERATE 35: CPT | Performed by: NURSE PRACTITIONER

## 2024-01-16 PROCEDURE — 99232 SBSQ HOSP IP/OBS MODERATE 35: CPT | Performed by: INTERNAL MEDICINE

## 2024-01-16 PROCEDURE — 99222 1ST HOSP IP/OBS MODERATE 55: CPT

## 2024-01-16 PROCEDURE — 97110 THERAPEUTIC EXERCISES: CPT

## 2024-01-16 PROCEDURE — 85025 COMPLETE CBC W/AUTO DIFF WBC: CPT | Performed by: HOSPITALIST

## 2024-01-16 PROCEDURE — 25010000002 HYDRALAZINE PER 20 MG

## 2024-01-16 RX ORDER — AMLODIPINE BESYLATE 2.5 MG/1
2.5 TABLET ORAL
Status: DISCONTINUED | OUTPATIENT
Start: 2024-01-16 | End: 2024-01-17

## 2024-01-16 RX ADMIN — Medication 1 TABLET: at 09:42

## 2024-01-16 RX ADMIN — Medication 10 ML: at 21:23

## 2024-01-16 RX ADMIN — CARVEDILOL 25 MG: 25 TABLET, FILM COATED ORAL at 09:42

## 2024-01-16 RX ADMIN — HYDRALAZINE HYDROCHLORIDE 10 MG: 20 INJECTION INTRAMUSCULAR; INTRAVENOUS at 17:16

## 2024-01-16 RX ADMIN — OXYCODONE HYDROCHLORIDE AND ACETAMINOPHEN 500 MG: 500 TABLET ORAL at 09:42

## 2024-01-16 RX ADMIN — SPIRONOLACTONE 25 MG: 25 TABLET ORAL at 09:42

## 2024-01-16 RX ADMIN — LORAZEPAM 0.5 MG: 0.5 TABLET ORAL at 21:02

## 2024-01-16 RX ADMIN — Medication 10 ML: at 09:44

## 2024-01-16 RX ADMIN — CLOPIDOGREL BISULFATE 75 MG: 75 TABLET, FILM COATED ORAL at 09:43

## 2024-01-16 RX ADMIN — ATORVASTATIN CALCIUM 80 MG: 80 TABLET, FILM COATED ORAL at 21:01

## 2024-01-16 RX ADMIN — BUSPIRONE HYDROCHLORIDE 10 MG: 10 TABLET ORAL at 21:22

## 2024-01-16 RX ADMIN — BUSPIRONE HYDROCHLORIDE 10 MG: 10 TABLET ORAL at 09:42

## 2024-01-16 RX ADMIN — AMLODIPINE BESYLATE 2.5 MG: 2.5 TABLET ORAL at 15:22

## 2024-01-16 RX ADMIN — ASPIRIN 81 MG: 81 TABLET, COATED ORAL at 09:47

## 2024-01-16 RX ADMIN — VENLAFAXINE HYDROCHLORIDE 75 MG: 75 CAPSULE, EXTENDED RELEASE ORAL at 09:42

## 2024-01-16 RX ADMIN — ALLOPURINOL 300 MG: 300 TABLET ORAL at 09:43

## 2024-01-16 NOTE — THERAPY TREATMENT NOTE
Patient Name: Antonette King  : 1934    MRN: [id number]                              Today's Date: 2024       Admit Date: 2024    Visit Dx:     ICD-10-CM ICD-9-CM   1. TIA (transient ischemic attack)  G45.9 435.9   2. Elevated troponin  R79.89 790.6   3. 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     Past Medical History:   Diagnosis Date    Aortic ectasia, thoracic     Breast cancer     left lumpectomy    CAD (coronary artery disease)     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 2021    History of blood clots     Hx of radiation therapy     , left breast ca    Hyperlipidemia 2021    Hypertension 2021    S/P CABG (coronary artery bypass graft) 10/12/2022     Past Surgical History:   Procedure Laterality Date    BREAST BIOPSY      BREAST LUMPECTOMY Left 2011    CARDIAC SURGERY      CARPAL TUNNEL RELEASE      HYSTERECTOMY      TONSILLECTOMY        General Information       Row Name 24 1613          OT Time and Intention    Document Type therapy note (daily note)  -PP     Mode of Treatment individual therapy;occupational therapy  -PP       Row Name 24 1613          General Information    Patient Profile Reviewed yes  -PP     Barriers to Rehab hearing deficit  -PP       Row Name 24 1613          Cognition    Orientation Status (Cognition) oriented x 4  -PP       Row Name  01/16/24 1613          Safety Issues, Functional Mobility    Impairments Affecting Function (Mobility) endurance/activity tolerance;strength;balance  -PP     Comment, Safety Issues/Impairments (Mobility) gait belt and non skid socks worn for safety  -PP               User Key  (r) = Recorded By, (t) = Taken By, (c) = Cosigned By      Initials Name Provider Type    PP Moshe Sears OT Occupational Therapist                     Mobility/ADL's       Row Name 01/16/24 1614          Bed Mobility    Supine-Sit [place] (Bed Mobility) standby assist  -PP     Sit-Supine [place] (Bed Mobility) standby assist  -PP     Comment, (Bed Mobility) no c/o dizziness sitting EOB  -PP       Row Name 01/16/24 1614          Transfers    Transfers sit-stand transfer  -PP       Row Name 01/16/24 1614          Sit-Stand Transfer    Sit-Stand [place] (Transfers) contact guard;verbal cues  -PP     Assistive Device (Sit-Stand Transfers) --  no AD  -PP     Comment, (Sit-Stand Transfer) 1x STS to reposition self towards HOB  -PP       Row Name 01/16/24 1614          Functional Mobility    Functional Mobility- Comment Pt able to take several steps toward head of bed on bedside w/ CGA and no AD.  -PP       Row Name 01/16/24 1614          Activities of Daily Living    BADL Assessment/Intervention --  Pt denies needing to perform d/t already completing w/ Nsg aid.  -PP       Row Name 01/16/24 1614          Lower Body Dressing Assessment/Training    Comment, (Lower Body Dressing) socks already donned  -PP               User Key  (r) = Recorded By, (t) = Taken By, (c) = Cosigned By      Initials Name Provider Type    PP Moshe Sears OT Occupational Therapist                   Obj/Interventions       Row Name 01/16/24 1619          Shoulder (Therapeutic Exercise)    Shoulder (Therapeutic Exercise) AROM (active range of motion)  -PP     Shoulder AROM (Therapeutic Exercise) bilateral;scapular elevation;scapular  protraction;scapular retraction;flexion;extension;10 repetitions;15 repititions;2 sets;3 sets;sitting  -PP       Row Name 01/16/24 1619          Elbow/Forearm (Therapeutic Exercise)    Elbow/Forearm (Therapeutic Exercise) AROM (active range of motion)  -PP     Elbow/Forearm AROM (Therapeutic Exercise) bilateral;flexion;extension;10 repetitions;15 repititions;sitting;2 sets;3 sets  -PP       Row Name 01/16/24 1619          Wrist (Therapeutic Exercise)    Wrist (Therapeutic Exercise) AROM (active range of motion)  -PP     Wrist AROM (Therapeutic Exercise) bilateral;flexion;extension;15 repititions;3 sets  -PP       Row Name 01/16/24 1619          Motor Skills    Functional Endurance fair  -PP     Therapeutic Exercise shoulder;elbow/forearm;wrist  -PP       Row Name 01/16/24 1619          Balance    Balance Assessment sitting static balance;sitting dynamic balance;standing static balance;standing dynamic balance  -PP     Static Sitting Balance standby assist  -PP     Dynamic Sitting Balance standby assist;contact guard  -PP     Position, Sitting Balance unsupported;sitting edge of bed  -PP     Static Standing Balance contact guard  -PP     Dynamic Standing Balance contact guard  -PP     Position/Device Used, Standing Balance unsupported;supported  -PP     Comment, Balance Pt encouraged to take her time and rest when need for self monitoring. no LOB noted.  -PP               User Key  (r) = Recorded By, (t) = Taken By, (c) = Cosigned By      Initials Name Provider Type    PP Moshe Sears, OT Occupational Therapist                   Goals/Plan    No documentation.                  Clinical Impression       Row Name 01/16/24 1621          Pain Assessment    Pretreatment Pain Rating 0/10 - no pain  -PP     Posttreatment Pain Rating 0/10 - no pain  -PP       Row Name 01/16/24 1621          Plan of Care Review    Plan of Care Reviewed With patient;son  -PP     Progress improving  -PP     Outcome Evaluation Pt seen for  OT tx session this pm w/ son present at bedside. Pt A&O x4, pleasant and quite [place]. She was however, agreeable to BUE ex sitting EOB although c/o being tired. Pt SBA for all bed mob this date and had no c/o dizziness sitting EOB. Pt sat EOB w/ SBA CGA at times to correct postural lean w/ act. Pt tolerated 2-3 sets x 10-15 reps of BUE therex for shoulders, elbow and wrist to improve act bobby and UE function during ADLs. Pt CGA w/ no AD for 1x STS from EOB to reposition self towards HOB. DC rec to home w/ assist/ 24/7 care. OT will continue to monitor.  -PP       Row Name 01/16/24 1621          Therapy Plan Review/Discharge Plan (OT)    Anticipated Discharge Disposition (OT) home with assist;home with 24/7 care  -PP       Row Name 01/16/24 1621          Vital Signs    O2 Delivery Pre Treatment room air  -PP     Pre Patient Position Supine  -PP     Intra Patient Position Standing  -PP     Post Patient Position Supine  -PP       Row Name 01/16/24 1621          Positioning and Restraints    Pre-Treatment Position in bed  -PP     Post Treatment Position bed  -PP     In Bed notified nsg;call light within reach;encouraged to call for assist;exit alarm on;fowlers;with family/caregiver  -PP               User Key  (r) = Recorded By, (t) = Taken By, (c) = Cosigned By      Initials Name Provider Type    PP Moshe Sears, PEG Occupational Therapist                   Outcome Measures       Row Name 01/16/24 1622          How much help from another is currently needed...    Putting on and taking off regular lower body clothing? 3  -PP     Bathing (including washing, rinsing, and drying) 3  -PP     Toileting (which includes using toilet bed pan or urinal) 3  -PP     Putting on and taking off regular upper body clothing 4  -PP     Taking care of personal grooming (such as brushing teeth) 4  -PP     Eating meals 4  -PP     AM-PAC 6 Clicks Score (OT) 21  -PP       Row Name 01/16/24 1400 01/16/24 0800       How much help from  another person do you currently need...    Turning from your back to your side while in flat bed without using bedrails? 4  -CHRISTIANE 4  -CHRISTIANE    Moving from lying on back to sitting on the side of a flat bed without bedrails? 3  -CHRISTIANE 3  -CHRISTIANE    Moving to and from a bed to a chair (including a wheelchair)? 3  -CHRISTIANE 3  -CHRISTIANE    Standing up from a chair using your arms (e.g., wheelchair, bedside chair)? 3  -CHRISTIANE 3  -CHRISTIANE    Climbing 3-5 steps with a railing? 2  -CHRISTIANE 2  -CHRISTIANE    To walk in hospital room? 3  -CHRISTIANE 3  -CHRISTIANE    AM-PAC 6 Clicks Score (PT) 18  -CHRISTIANE 18  -CHRISTIANE    Highest Level of Mobility Goal 6 --> Walk 10 steps or more  -CHRISTIANE 6 --> Walk 10 steps or more  -CHRISTIANE      Row Name 01/16/24 1622          Modified Philipp Scale    Modified Philipp Scale 2 - Slight disability.  Unable to carry out all previous activities but able to look after own affairs without assistance.  -PP       Row Name 01/16/24 1622          Functional Assessment    Outcome Measure Options AM-PAC 6 Clicks Daily Activity (OT)  -PP               User Key  (r) = Recorded By, (t) = Taken By, (c) = Cosigned By      Initials Name Provider Type    PP Moshe Sears OT Occupational Therapist    CHRISTIANE Alvarez Hughes, RN Registered Nurse                    Occupational Therapy Education       Title: PT OT SLP Therapies (Done)       Topic: Occupational Therapy (Done)       Point: ADL training (Done)       Description:   Instruct learner(s) on proper safety adaptation and remediation techniques during self care or transfers.   Instruct in proper use of assistive devices.                  Learning Progress Summary             Patient Acceptance, E, VU by SHAD at 1/15/2024 0908    Acceptance, E, VU by ELI at 1/14/2024 1146   Family Acceptance, E, VU by ELI at 1/14/2024 1146                         Point: Home exercise program (Done)       Description:   Instruct learner(s) on appropriate technique for monitoring, assisting and/or progressing therapeutic exercises/activities.                   Learning Progress Summary             Patient Acceptance, E, VU by KN at 1/15/2024 0908    Acceptance, E, VU by ELI at 1/14/2024 1146   Family Acceptance, E, VU by ELI at 1/14/2024 1146                         Point: Precautions (Done)       Description:   Instruct learner(s) on prescribed precautions during self-care and functional transfers.                  Learning Progress Summary             Patient Acceptance, E, VU by KN at 1/15/2024 0908    Acceptance, E, VU by ELI at 1/14/2024 1146   Family Acceptance, E, VU by ELI at 1/14/2024 1146                         Point: Body mechanics (Done)       Description:   Instruct learner(s) on proper positioning and spine alignment during self-care, functional mobility activities and/or exercises.                  Learning Progress Summary             Patient Acceptance, E, VU by SHAD at 1/15/2024 0908    Acceptance, E, VU by ELI at 1/14/2024 1146   Family Acceptance, E, VU by ELI at 1/14/2024 1146                                         User Key       Initials Effective Dates Name Provider Type Discipline    SHAD 08/02/22 -  Ismael Smith, OT Occupational Therapist OT    ELI 10/12/23 -  Renea Chandler OT Occupational Therapist OT                  OT Recommendation and Plan     Plan of Care Review  Plan of Care Reviewed With: patient, son  Progress: improving  Outcome Evaluation: Pt seen for OT tx session this pm w/ son present at bedside. Pt A&O x4, pleasant and quite [place]. She was however, agreeable to BUE ex sitting EOB although c/o being tired. Pt SBA for all bed mob this date and had no c/o dizziness sitting EOB. Pt sat EOB w/ SBA CGA at times to correct postural lean w/ act. Pt tolerated 2-3 sets x 10-15 reps of BUE therex for shoulders, elbow and wrist to improve act bobby and UE function during ADLs. Pt CGA w/ no AD for 1x STS from EOB to reposition self towards HOB. DC rec to home w/ assist/ 24/7 care. OT will continue to monitor.     Time Calculation:         Time Calculation-  OT       Row Name 01/16/24 1623             Time Calculation- OT    OT Start Time 1439  -PP      OT Stop Time 1454  -PP      OT Time Calculation (min) 15 min  -PP      Total Timed Code Minutes- OT 15 minute(s)  -PP      OT Received On 01/16/24  -PP      OT - Next Appointment 01/17/24  -PP         Timed Charges    18756 - OT Therapeutic Exercise Minutes 15  -PP         Total Minutes    Timed Charges Total Minutes 15  -PP       Total Minutes 15  -PP                User Key  (r) = Recorded By, (t) = Taken By, (c) = Cosigned By      Initials Name Provider Type    PP Moshe Sears, OT Occupational Therapist                  Therapy Charges for Today       Code Description Service Date Service Provider Modifiers Qty    29105306068 HC OT THER PROC EA 15 MIN 1/16/2024 Moshe Sears OT GO 1                 Moshe Sears OT  1/16/2024

## 2024-01-16 NOTE — SIGNIFICANT NOTE
01/16/24 1548   OTHER   Discipline physical therapy assistant   Rehab Time/Intention   Session Not Performed patient/family declined treatment  (Pt's son declined PT w/ pt sleeping this PM. Per son, pt just finished working w/ OT and also had orthostatics. PT will follow up tomorrow.)   Recommendation   PT - Next Appointment 01/17/24

## 2024-01-16 NOTE — PROGRESS NOTES
LOS: 2 days   Patient Care Team:  Tonny Willis MD as PCP - General (Family Medicine)    Chief Complaint: Follow-up possible TIA, labile blood pressure.    Interval History: She continues to have baseline hypertension and orthostatic hypotension.  No chest pain.    Vital Signs:  Temp:  [97.7 °F (36.5 °C)-98.6 °F (37 °C)] 97.9 °F (36.6 °C)  Heart Rate:  [70-84] 70  Resp:  [18] 18  BP: (115-241)/() 155/101    Intake/Output Summary (Last 24 hours) at 1/16/2024 1307  Last data filed at 1/16/2024 0003  Gross per 24 hour   Intake 360 ml   Output --   Net 360 ml       Physical Exam:   General Appearance:    No acute distress, alert and oriented x4   Lungs:     Clear to auscultation bilaterally     Heart:    Regular rhythm and normal rate.  III/VI SM RUSB and LUSB.   Abdomen:     Soft, nontender, nondistended.    Extremities:   No clubbing, cyanosis, or edema.     Results Review:    Results from last 7 days   Lab Units 01/16/24  1029   SODIUM mmol/L 139   POTASSIUM mmol/L 3.9   CHLORIDE mmol/L 104   CO2 mmol/L 26.0   BUN mg/dL 23   CREATININE mg/dL 1.31*   GLUCOSE mg/dL 113*   CALCIUM mg/dL 9.4     Results from last 7 days   Lab Units 01/14/24  0334 01/14/24  0130   HSTROP T ng/L 69* 73*     Results from last 7 days   Lab Units 01/16/24  1029   WBC 10*3/mm3 8.47   HEMOGLOBIN g/dL 8.8*   HEMATOCRIT % 27.7*   PLATELETS 10*3/mm3 278     Results from last 7 days   Lab Units 01/14/24  0852 01/14/24  0029   INR  1.12* 1.23*   APTT seconds  --  29.7     Results from last 7 days   Lab Units 01/14/24  0642   CHOLESTEROL mg/dL 204*     Results from last 7 days   Lab Units 01/15/24  0526   MAGNESIUM mg/dL 2.0     Results from last 7 days   Lab Units 01/14/24  0642   CHOLESTEROL mg/dL 204*   TRIGLYCERIDES mg/dL 97   HDL CHOL mg/dL 58   LDL CHOL mg/dL 129*       I reviewed the patient's new clinical results.        Assessment:  1.  Possible TIA  2.  Systemic hypertension with profound orthostatic hypotension (overall  labile blood pressure)  3.  Syncope secondary to hypotension  4.  Multiple aneurysms on MRA, most prominently in the left ICA  5.  Coronary artery disease, status post CABG in approximately 2000 at [facility].  6.  Moderate aortic stenosis  7.  Chronic diastolic CHF  8.  Stage III chronic kidney disease  9.  Alzheimer's dementia  10.  Multifactorial anemia    Plan:  -Spoke with Dr. Romeo.  This is a very challenging clinical situation.  She essentially has severe systemic hypertension with profound orthostatic hypotension.  This is extremely difficult, if not impossible, to adequately manage.    -Agree with checking renal artery Doppler ultrasound tomorrow to assess for renal artery stenosis.  I made her n.p.o. after midnight for this (as the vascular lab requires this).    -Reviewed echocardiogram.  There is moderate aortic stenosis, which is unchanged.    -I am going to keep the carvedilol 25 mg twice a day, and she has been started on spironolactone 25 mg/day.  I am going to add a very low-dose of amlodipine 2.5 mg/day.  We may need to have her take something as needed.  She is going to have to be extremely careful when standing and always hold onto her walker initially.  She is to avoid any abrupt changes in position.  Dr. Romeo and I also agree with compression stockings, and he is also going to order an abdominal binder to help with the orthostasis.    -No evidence of angina.  She is on dual antiplatelet therapy per neurology for the recent possible TIA.  She is also on statin therapy.    -Doubtful she is going to be a candidate for any intervention with the aneurysms on the MRA.  However, this also makes it more difficult given that high blood pressure makes these more likely to rupture.    -Cardiology will continue to follow.    Herman Acevedo MD  01/16/24  13:07 EST

## 2024-01-16 NOTE — PLAN OF CARE
Goal Outcome Evaluation:      Patient has not had any acute events overnight.  Blood pressure did come down to acceptable levels and continuing to be monitored closely.  Patient was able to ambulate to the bathroom and back to bed with one person assist.  Patient stated that they did not get dizzy either time.  Room air.  Patient had a bowel movement, no complaints of pain.  Will continue to monitor and provide care as appropriate and ordered.  Bed in lowest position, call bell and personal items in reach.  Bed alarm on.  Son at bedside.

## 2024-01-16 NOTE — CONSULTS
[facility] NEUROSURGERY CONSULT NOTE    Patient name: Antonette King  Referring Provider: Nicolasa Anders APRN   Reason for Consultation: Multiple aneurysms, most concerning left internal carotid artery    Patient Care Team:  Tonny Willis MD as PCP - General (Family Medicine)    Chief complaint: Fall    Subjective .     History of present illness:    Patient is a 89 y.o.  female with CAD, dementia, hypertension, hyperlipidemia, previous CABG, and previous breast cancer who presents to the hospital yesterday after she was found down with altered mental status.  She was found to be generally weak.  She reports that she slipped out of her chair 2 days ago..  She reports dizziness prior to falling.  By the time she presented to the emergency department she was back to her neurologic baseline.  She does report history of right visual field blurriness which is been present for least 5 months since her previous fall.  Visual symptoms have been unchanged since previous 2 falls in the past week.  She denies upper or lower extremity weakness, numbness.  She denies headaches.    Review of Systems  Review of Systems   Neurological:  Positive for dizziness and weakness (Was reporting history of weakness, however, no significant focal weakness currently). Negative for facial asymmetry, numbness and headaches.   Psychiatric/Behavioral:  Positive for confusion (However not currently).        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 04/16/2021    History of blood clots     Hx of radiation therapy     2001, left breast ca    Hyperlipidemia 04/16/2021    Hypertension 04/16/2021    S/P CABG (coronary artery bypass graft) 10/12/2022       PAST SURGICAL HISTORY  Past Surgical History:   Procedure Laterality Date     BREAST BIOPSY      BREAST LUMPECTOMY Left 2011    CARDIAC SURGERY      CARPAL TUNNEL RELEASE      HYSTERECTOMY      TONSILLECTOMY         FAMILY HISTORY  Family History   Problem Relation Age of Onset    Stroke Mother     Stroke Brother     Breast cancer Neg Hx        SOCIAL HISTORY  Social History     Tobacco Use    Smoking status: Former     Types: Cigarettes    Smokeless tobacco: Never   Vaping Use    Vaping Use: Never used   Substance Use Topics    Alcohol use: No     Comment: caffeine use    Drug use: No         Allergies:  Prednisone    MEDICATIONS:  Medications Prior to Admission   Medication Sig Dispense Refill Last Dose    allopurinol (ZYLOPRIM) 300 MG tablet Take 1 tablet by mouth Daily.   1/13/2024    ascorbic acid (VITAMIN C) 500 MG tablet Take 1 tablet by mouth Daily.   1/13/2024    aspirin 81 MG chewable tablet Chew 1 tablet Daily.   1/13/2024    atorvastatin (LIPITOR) 40 MG tablet Take 1 tablet by mouth Every Night. 30 tablet 0 1/13/2024    busPIRone (BUSPAR) 10 MG tablet Take 1 tablet by mouth 2 (Two) Times a Day.   1/13/2024    carvedilol (COREG) 12.5 MG tablet Take 1 tablet by mouth 2 (Two) Times a Day. 180 tablet 3 1/13/2024    clopidogrel (PLAVIX) 75 MG tablet Take 1 tablet by mouth Daily.   1/13/2024    hydrALAZINE (APRESOLINE) 25 MG tablet Take 1 tablet by mouth 2 (Two) Times a Day. 90 tablet 0 1/13/2024    isosorbide mononitrate (IMDUR) 30 MG 24 hr tablet Take 1 tablet by mouth Daily. 90 tablet 3 1/13/2024    losartan (COZAAR) 100 MG tablet Take 1 tablet by mouth Daily.   1/13/2024    multivitamin with minerals tablet tablet Take 1 tablet by mouth Daily.   1/13/2024    venlafaxine XR (EFFEXOR-XR) 75 MG 24 hr capsule Take 1 capsule by mouth Daily.   1/13/2024    vitamin D3 125 MCG (5000 UT) capsule capsule Take 1 capsule by mouth Daily.   1/13/2024    LORazepam (ATIVAN) 0.5 MG tablet Take half to 1 tab po bid prn anxiety 10 tablet 0 More than a month       Objective     Results  Review:  LABS:    No results displayed because visit has over 200 results.          DIAGNOSTICS:  MRI brain without contrast 1/14/2024:  FINDINGS: There is no evidence of restricted diffusion to suggest acuteinfarction. Increased signal intensity is appreciated involving theperiventricular white matter of the cerebral hemispheres bilaterally andto a lesser extent vinayak centrally consistent with moderate-to-severesmall vessel ischemic disease. A remote infarct involving the head ofthe caudate nucleus on the right and extending to the anterior aspect ofbasal ganglia is appreciated which was present on 04/15/2021. Itmeasures approximately 1.8 cm in the maximum transverse dimension. Aremote infarct involving the left parietal lobe superior laterally isappreciated measuring approximately 1.8 cm in size and also presentpreviously. Fluid is present within the mastoid air cells bilaterally,more prominent on the right.There is prominent signal loss appreciated adjacent to the proximalcavernous ICA on the left on the axial T2 sequence. This was presentpreviously. The findings are suggestive of an aneurysm which measuresapproximately 1.4 cm in size.IMPRESSION:1.  There is no evidence of an acute infarct.2.  Remote infarcts involving the left parietal lobe and involving thehead of the caudate nucleus on the right extending to the anterioraspect of the basal ganglia are noted present on 04/15/2021.3.  Small vessel ischemic disease and fluid within the mastoid air cellsbilaterally, more prominent on the right is appreciated.4.  There is an area of prominent signal loss on the axial T2 sequencerelated to or adjacent to the proximal cavernous ICA on the left, mostconsistent with that of an aneurysm. It measures approximately 1.4 cm indiameter. Further evaluation with a MRA of the head is recommended  CT head without contrast 1/14/2024:  No acute infarct or hemorrhage.  No mass effect or midline shift.  There are scattered areas of  hypoattenuation in the supratentorial white matter likely representative of chronic small vessel ischemic change.  There is stable encephalomalacia in the right basal ganglia and caudate head, left basal ganglia and left parietal lobe.  Ventricles within normal limits.  MRI head and neck 1/15/2024:  FINDINGS: Limited whole brain MRI images were performed consisting of  just axial FLAIR and diffusion weighted images and demonstrate patchy  and confluent T2 high signal in the periventricular and subcortical  white matter of the cerebral hemispheres consistent with moderate small  vessel disease. There is an old lacunar-type infarct involving the head  of the right caudate nucleus extending through the inferior aspect of  the anterior limb of the right internal capsule measuring 15 x 10 mm in  size. There is also an area of encephalomalacia in the anterior left  parietal lobe measuring 2.5 x 2 cm in size compatible with an old  anterior left parietal infarct in the left MCA territory. There is mild  cerebral atrophy. The ventricles are normal in size. I see no mass  effect and no midline shift and no extra-axial fluid collections are  identified and no diffusion weighted abnormality is seen with no acute  infarct identified. The paranasal sinuses are clear. There is partial  opacification of the mastoid air cells with fluid bilaterally.     On the MRA of the vertebrobasilar system, the left vertebral artery is  smaller in diameter than the right. Both vertebral arteries lose central  signal as they greenfield the dura and extend intracranially, which may be  technical in origin. The small-diameter left vertebral artery gives off  the left posterior inferior cerebellar artery. There is a markedly  hypoplastic, near-atretic post-PICA segment of the left vertebral artery  that demonstrates diminished flow-related signal within it. The dominant  right vertebral artery appears widely patent in its upper cervical  segment.  Intracranially there is atherosclerotic plaque that at least  mild to moderate up to moderately narrows the intracranial segment of  the dominant right vertebral artery at the level of the right PICA  origin and the post-PICA segment demonstrates moderate-to-severe  stenosis and there is complex appearance to the right vertebrobasilar  junction and proximal basilar artery where there are lobulated areas of  flow-related signal along the anterior margin of the vertebrobasilar  junction, could potentially be an aneurysm. There is also nodular  excrescence off the proximal basilar artery that potentially could  represent an aneurysm. The upper cervical and petrous segments of the  internal carotid arteries are within normal limits. There are several  tiny 2 mm protuberances off the medial and lateral wall of the cavernous  segment of the right internal carotid artery, could be a tiny  atherosclerotic aneurysm, and there is aneurysmal dilatation of the  cavernous left internal carotid artery with the aneurysm measuring 17 x  17 mm in transverse dimension. There is diminished flow-related signal  within the aneurysm, likely due to the large size of the aneurysm and  turbulent flow within the substance of the aneurysm. The aneurysm is  incompletely characterized on this exam. On the MRA of the [place] of  [place], the mid and distal basilar artery and basilar tip are within  normal limits. There is an atretic P1 segment of the right posterior  cerebral artery with persistent fetal origin of the right posterior  cerebral artery off the back wall of the supracavernous right internal  carotid artery, which is a normal anatomic variation. There is a  mild-to-moderate stenosis of the distal P2 segment of the right  posterior cerebral artery; otherwise, the posterior cerebral and  superior cerebellar arteries are within normal limits. There is a 2 mm  nodular protuberance off the anterior wall of the supracavernous left  internal  carotid artery at the level of the left ophthalmic origin that  could potentially represent a small aneurysm; otherwise, the  supracavernous internal carotid arteries are normal in appearance to the  carotid termini. The A1 segments of the anterior cerebral arteries and  the anterior communicating artery origin and the A2 segments of the  anterior cerebral arteries are within normal limits. The M1 segments of  the left middle cerebral artery and the left middle cerebral artery  bifurcation are within normal limits. There is mild narrowing of the  distal M1 segment of the right middle cerebral artery and there is a 1  mm protuberance at the level of the right middle cerebral artery  bifurcation, maybe an ulcerated atherosclerotic plaque, less likely a  tiny aneurysm. The M2 segments are within normal limits.     IMPRESSION:  1. Limited whole brain MRI images consisting of axial FLAIR and  diffusion weighted images are unchanged when compared to yesterday's MRI  of the brain on 01/14/2024 at 9 a.m. There is moderate small vessel  disease in the cerebral white matter and a 15 mm old lacunar infarct  extending from the head of the right caudate nucleus through the  anterior limb of the right internal capsule. There is a 2.5 x 2 cm old  superior left parietal infarct in the left MCA territory and there is  mild diffuse cerebral atrophy. No acute intracranial abnormality is  identified with no acute infarct identified.     2. Both vertebral arteries lose their flow signal as they greenfield the  dura and extend intracranially, likely turbulent flow artifact. There is  a markedly hypoplastic near-atretic post-PICA segment of the left  vertebral artery that may occlude distally. There is an abnormal  appearance to the mid and distal intracranial segment of the abdominal  right vertebral artery with the atherosclerotic plaque at least mild to  moderate up to moderately narrowing the dominant intracranial right  vertebral artery  at the right PICA origin and then there is a focal  moderate-to-severe stenosis of the distal intracranial segment and there  are nodular protuberances off the distal right vertebral artery and  anterior wall of the proximal basilar artery that are concerning for  multiple small aneurysms. Furthermore, there are 2 mm nodular  protuberances off the medial and lateral walls of the cavernous right  internal carotid artery that may be small atherosclerotic aneurysms.  Furthermore, there is a giant fusiform aneurysm involving the left  cavernous and left internal carotid artery that measures up to 17 x 17  mm in size and demonstrates diminished flow-related signal within its  central and lateral aspect, likely due to slow or turbulent flow within  the lumen of this large aneurysm. There is a 2 mm nodular protuberance  off the anterior wall of the immediate supracavernous left ICA that  could be a periophthalmic artery origin aneurysm. There is mild  narrowing of the distal M1 segment of the right middle cerebral artery  where there is a 1 mm nodular protuberance that could be an ulcerated  atherosclerotic plaque or potentially a tiny aneurysm at this site. I  recommend a CTA of the head to noninvasively more optimally characterize  the pathology described or one could do a cerebral arteriogram for more  comprehensive assessment.     MRA OF THE NECK TECHNIQUE: 3D time-of-flight images were obtained of the  great vessels of the neck. An additional gadolinium enhanced MRA was  obtained of the great vessels of the neck and maximum intensity  projection reconstructions were performed to complete the MRA  examination.     FINDINGS: There is anatomic origin of the great vessels off the aortic  arch. The left subclavian artery origin is normal in appearance and no  significant stenosis is seen in the left subclavian artery. The left  vertebral origin and the proximal left vertebral artery are less than  optimally assessed. The  cervical segment of the left vertebral artery is  widely patent without stenosis. There appears to be mild-to-moderate up  to moderate stenosis in the left vertebral artery as it pierces the dura  and extends intracranially. The left common carotid origin is within  normal limits. No significant stenosis is seen in the left common  carotid artery. Atherosclerotic plaque mildly narrows the origin of the  left internal carotid artery by 20% to 30% using the NASCET criteria.  The remainder of the cervical segment of the left internal carotid  artery is within normal limits. The brachiocephalic artery origin is  normal in appearance and no stenosis is seen in the brachiocephalic  artery and its bifurcation into the right subclavian and common carotid  arteries is normal in appearance. No stenosis is seen in the right  subclavian artery. The dominant right vertebral origin is within normal  limits and no stenosis is seen in the cervical segment of the right  vertebral artery. The right common carotid origin is normal in  appearance and no stenosis is seen in the right common carotid artery  and no stenosis is seen in the cervical segment of the right internal  carotid artery using the NASCET criteria.     IMPRESSION:  1. The small-diameter left vertebral artery origin and proximal left  vertebral artery are not adequately assessed. The cervical segment is  widely patent but there appears to be a moderate stenosis in the left  vertebral artery as it pierces the dura and extends intracranially.  Intracranially it gives off the left posterior inferior cerebellar  artery and its post-PICA segment is markedly hypoplastic and may occlude  distally. The dominant right vertebral artery is widely patent from its  origin to the intracranial segment without stenosis. The juxta-PICA and  post-PICA segment appear multifocally stenotic with findings suspicious  for multiple small aneurysm off the distal intracranial segment of  the  right vertebral artery and proximal basilar artery as described above.     2. Atherosclerotic plaque results in a mild stenosis at the origin of  the cervical segment of the left internal carotid artery of about 20% to  30% using the NASCET criteria and no convincing stenosis is seen in the  cervical segment of the right internal carotid artery.     If it is going to affect this patient's management, a dedicated CT  angiogram of the head and neck could be obtained to noninvasively more  completely characterize the pathology of the suspected aneurysm off the  distal intracranial right vertebral artery, proximal basilar artery as  well as the cavernous internal carotid aneurysms and the possible 2 mm  left periophthalmic artery origin aneurysm or one could obtain an  arteriogram for a more comprehensive assessment.     Results Review:   I reviewed the patient's new clinical results.  I personally viewed and interpreted the patient's labs, imaging studies, medications and chart.  I discussed imaging with Dr. Blanco on 1/16/2024.    Vital Signs   Temp:  [97.7 °F (36.5 °C)-98.6 °F (37 °C)] 97.9 °F (36.6 °C)  Heart Rate:  [70-84] 70  Resp:  [18] 18  BP: (115-241)/() 164/101    Physical Exam:  Physical Exam  Vitals reviewed.   Constitutional:       General: She is not in acute distress.     Appearance: Normal appearance. She is not ill-appearing, toxic-appearing or diaphoretic.   HENT:      Head: Normocephalic.      Nose: Nose normal.      Mouth/Throat:      Mouth: Mucous membranes are moist.      Pharynx: Oropharynx is clear.   Eyes:      Extraocular Movements: Extraocular movements intact.      Conjunctiva/sclera: Conjunctivae normal.      Pupils: Pupils are equal, round, and reactive to light.   Cardiovascular:      Rate and Rhythm: Normal rate.   Pulmonary:      Effort: Pulmonary effort is normal.   Musculoskeletal:         General: Normal range of motion.      Cervical back: Normal range of motion.    Skin:     General: Skin is warm.   Neurological:      General: No focal deficit present.      Mental Status: She is alert and oriented to person, place, and time. Mental status is at baseline.      Coordination: Finger-Nose-Finger Test normal.   Psychiatric:         Mood and Affect: Mood normal.         Speech: Speech normal.         Behavior: Behavior normal.         Thought Content: Thought content normal.         Judgment: Judgment normal.       Neurologic Exam     Mental Status   Oriented to person, place, and time.   Attention: normal. Concentration: normal.   Speech: speech is normal   Level of consciousness: alert  Knowledge: consistent with education.     Cranial Nerves     CN II   Visual fields full to confrontation.   Right visual field deficit: none  Left visual field deficit: none     CN III, IV, VI   Pupils are equal, round, and reactive to light.  Right pupil: Size: 3 mm. Shape: regular. Reactivity: brisk.   Left pupil: Size: 3 mm. Shape: regular. Reactivity: brisk.   CN III: no CN III palsy  CN VI: no CN VI palsy  Diplopia: none  Upgaze: normal  Downgaze: normal    CN V   Facial sensation intact.   Right facial sensation deficit: none  Left facial sensation deficit: none    CN VII   Right facial weakness: none  Left facial weakness: none    CN VIII   Hearing: impaired    CN IX, X   CN IX normal.   CN X normal.     CN XI   CN XI normal.     CN XII   CN XII normal.   Patient does report right eye blurriness at baseline since 5 months ago.     Motor Exam   Muscle bulk: normal  Overall muscle tone: normal  Right arm tone: normal  Left arm tone: normal  Right arm pronator drift: absent  Left arm pronator drift: absentNo lower extremity drift     Sensory Exam   Light touch normal.     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 King is an 89-year-old female with history of couple falls in the past week.  She first fell out of her chair after a dizzy spell.  She was later found to be positive for orthostatic hypotension.  TIA versus CVA was suspected given she had complaints of aphasia with possible unilateral weakness.  MRI did not show any acute findings.  From a neurosurgical's perspective, imaging shows multiple aneurysms with largest 17 mm fusiform aneurysm of the left cavernous left /internal carotid artery.  Given the location and patient status, there is no current need for neurosurgical intervention.  The patient does have some chronic right-sided visual issues and I recommended she follow-up with an ophthalmologist.  She has been attempting to do this for a while now but has not gone to any appointments.  We will have her follow-up in 2 weeks with Dr. Blanco.  She will call us if she develops any new or worsening neurologic issues.    PLAN:     Follow-up with neurosurgery in 2 weeks  Neurosurgery signing off      I discussed the patient's findings and my recommendations with patient and Dr. Jackson    During patient visit, I utilized appropriate personal protective equipment including mask and gloves.  Mask used was standard procedure mask. Appropriate PPE was worn during the entire visit.  Hand hygiene was completed before and after.     Donald Biggstaarmani, APRN  01/16/24  15:12 EST    \"Dictated utilizing Dragon dictation\".    "

## 2024-01-16 NOTE — PLAN OF CARE
Goal Outcome Evaluation:      This patient is a possible DC for tomorrow. Her son has been at her bedside providing support and care this shift. Obtained an abdominal binder for this patient. She remains alert, oriented and stable.

## 2024-01-16 NOTE — PROGRESS NOTES
Dedicated to Hospital Care    [phone]   LOS: 2 days     Name: Antonette King  Age/Sex: 89 y.o. female  :  1934        PCP: Tonny Willis MD  Chief Complaint   Patient presents with    Neuro Deficit(s)      Subjective   She feels okay today other than her blood pressure is high she is not really having any symptoms of this.  Denies other new issues or complaints at this time  General: No Fever or Chills, Cardiac: No Chest Pain or Palpitations, Resp: No Cough or SOA, GI: No Nausea, Vomiting, or Diarrhea, and Other: No bleeding    allopurinol, 300 mg, Oral, Daily  ascorbic acid, 500 mg, Oral, Daily  aspirin, 81 mg, Oral, Daily  atorvastatin, 80 mg, Oral, Nightly  busPIRone, 10 mg, Oral, Q12H  carvedilol, 25 mg, Oral, BID  clopidogrel, 75 mg, Oral, Daily  multivitamin with minerals, 1 tablet, Oral, Daily  senna-docusate sodium, 2 tablet, Oral, BID  sodium chloride, 10 mL, Intravenous, Q12H  spironolactone, 25 mg, Oral, Daily  venlafaxine XR, 75 mg, Oral, Daily           Objective   Vital Signs  Temp:  [97.7 °F (36.5 °C)-98.6 °F (37 °C)] 97.9 °F (36.6 °C)  Heart Rate:  [71-84] 76  Resp:  [18] 18  BP: (115-241)/() 195/105  Body mass index is 26.67 kg/m².    Intake/Output Summary (Last 24 hours) at 2024 0948  Last data filed at 2024 0003  Gross per 24 hour   Intake 360 ml   Output --   Net 360 ml       Physical Exam  Vitals and nursing note reviewed.   Constitutional:       General: She is not in acute distress.     Appearance: She is obese. She is ill-appearing.   Cardiovascular:      Rate and Rhythm: Normal rate and regular rhythm.   Pulmonary:      Effort: No respiratory distress.      Breath sounds: Normal breath sounds.   Abdominal:      General: Bowel sounds are normal. There is no distension.      Palpations: Abdomen is soft.   Neurological:      General: No focal deficit present.      Mental Status: She is alert. Mental status is at baseline.           Results Review:       I  reviewed the patient's new clinical results.  Results from last 7 days   Lab Units 01/15/24  0526 01/14/24  0642 01/14/24  0029   WBC 10*3/mm3 7.81 10.47 10.91*   HEMOGLOBIN g/dL 9.0* 10.5* 9.6*   PLATELETS 10*3/mm3 262 278 292     Results from last 7 days   Lab Units 01/15/24  0526 01/14/24  0642 01/14/24  0130   SODIUM mmol/L 141 136 142   POTASSIUM mmol/L 3.7 4.6 3.6   CHLORIDE mmol/L 105 98 105   CO2 mmol/L 25.0 20.0* 24.5   BUN mg/dL 21 28* 27*   CREATININE mg/dL 1.56* 1.64* 1.53*   CALCIUM mg/dL 9.2 9.9 8.4*   MAGNESIUM mg/dL 2.0  --   --    PHOSPHORUS mg/dL 3.3  --   --    Estimated Creatinine Clearance: 23.4 mL/min (A) (by C-G formula based on SCr of 1.56 mg/dL (H)).      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       PLAN  This is an 89-year-old lady with a history of hypertension dementia hyperlipidemia coronary artery disease who presents to the hospital with weakness and confusion and was admitted to our service to evaluate for TIA but sounds more consistent with syncope and orthostasis.   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  Will follow-up neurology recommendations today after review of her MRAs  -My suspicion is that this is related to orthostatic drops in her blood pressure.  She may have had some convulsive syncope as well.  -She is hypertensive again today.  Unfortunately she has not received her morning meds as of 915 for my evaluation.  Would like to see how she responds to the medications we have ordered  including spironolactone and increase Coreg before we make further adjustments.  She was also given a one-time dose of Norvasc last evening.  Recheck orthostatics this afternoon  -She does have underlying chronic kidney disease with baseline creatinine around 1.4-1.5.  Creatinine 1.56 yesterday morning.  Continue to monitor electrolytes and avoid nephrotoxic agents.  Labs pending for today  -Known history of coronary artery disease continue aspirin and Plavix and beta-blocker.  -Mechanical DVT prophylaxis  -Full code      Disposition  Expected Discharge Date: 1/17/2024; Expected Discharge Time: 12:00 PM       Milind Romeo MD  [facility]  01/16/24  09:48 EST

## 2024-01-16 NOTE — PLAN OF CARE
Goal Outcome Evaluation:  Plan of Care Reviewed With: patient, son        Progress: improving  Outcome Evaluation: Pt seen for OT tx session this pm w/ son present at bedside. Pt A&O x4, pleasant and quite [place]. She was however, agreeable to BUE ex sitting EOB although c/o being tired. Pt SBA for all bed mob this date and had no c/o dizziness sitting EOB. Pt sat EOB w/ SBA CGA at times to correct postural lean w/ act. Pt tolerated 2-3 sets x 10-15 reps of BUE therex for shoulders, elbow and wrist to improve act bobby and UE function during ADLs. Pt CGA w/ no AD for 1x STS from EOB to reposition self towards HOB. DC rec to home w/ assist/ 24/7 care. OT will continue to monitor.      Anticipated Discharge Disposition (OT): home with assist, home with 24/7 care

## 2024-01-17 ENCOUNTER — APPOINTMENT (OUTPATIENT)
Dept: CT IMAGING | Facility: HOSPITAL | Age: 89
End: 2024-01-17
Payer: MEDICARE

## 2024-01-17 ENCOUNTER — APPOINTMENT (OUTPATIENT)
Dept: CARDIOLOGY | Facility: HOSPITAL | Age: 89
End: 2024-01-17
Payer: MEDICARE

## 2024-01-17 LAB
ANION GAP SERPL CALCULATED.3IONS-SCNC: 8 MMOL/L (ref 5–15)
BASOPHILS # BLD AUTO: 0.05 10*3/MM3 (ref 0–0.2)
BASOPHILS NFR BLD AUTO: 0.5 % (ref 0–1.5)
BH CV ECHO MEAS - DIST REN A EDV LEFT: 19.1 CM/S
BH CV ECHO MEAS - DIST REN A PSV LEFT: 61.3 CM/S
BH CV ECHO MEAS - MID REN A EDV LEFT: -9.1 CM/S
BH CV ECHO MEAS - MID REN A PSV LEFT: -31.2 CM/S
BH CV ECHO MEAS - PROX REN A EDV LEFT: 21.4 CM/S
BH CV ECHO MEAS - PROX REN A PSV LEFT: 64.2 CM/S
BH CV VAS BP LEFT ARM: NORMAL MMHG
BH CV VAS BP RIGHT ARM: NORMAL MMHG
BH CV VAS RENAL AORTIC MID EDV: 14 CM/S
BH CV VAS RENAL AORTIC MID PSV: 74 CM/S
BH CV VAS RENAL HILUM LEFT EDV: 4 CM/S
BH CV VAS RENAL HILUM LEFT PSV: 18 CM/S
BH CV VAS RENAL HILUM RIGHT EDV: 9 CM/S
BH CV VAS RENAL HILUM RIGHT PSV: 27 CM/S
BH CV XLRA MEAS - KID L LEFT: 9.1 CM
BH CV XLRA MEAS DIST REN A EDV RIGHT: 15.8 CM/S
BH CV XLRA MEAS DIST REN A PSV RIGHT: 61.2 CM/S
BH CV XLRA MEAS KID L RIGHT: 10.1 CM
BH CV XLRA MEAS KID W RIGHT: 4.5 CM
BH CV XLRA MEAS MID REN A EDV RIGHT: 16.6 CM/S
BH CV XLRA MEAS MID REN A PSV RIGHT: 104.7 CM/S
BH CV XLRA MEAS PROX REN A EDV RIGHT: 24.3 CM/S
BH CV XLRA MEAS PROX REN A PSV RIGHT: 89.7 CM/S
BH CV XLRA MEAS RAR LEFT: 1
BH CV XLRA MEAS RAR RIGHT: 1.4
BH CV XLRA MEAS RENAL A ORG EDV LEFT: 23.1 CM/S
BH CV XLRA MEAS RENAL A ORG EDV RIGHT: 24.5 CM/S
BH CV XLRA MEAS RENAL A ORG PSV LEFT: 74.1 CM/S
BH CV XLRA MEAS RENAL A ORG PSV RIGHT: 80.6 CM/S
BUN SERPL-MCNC: 25 MG/DL (ref 8–23)
BUN/CREAT SERPL: 16.9 (ref 7–25)
CALCIUM SPEC-SCNC: 9.1 MG/DL (ref 8.6–10.5)
CHLORIDE SERPL-SCNC: 104 MMOL/L (ref 98–107)
CO2 SERPL-SCNC: 26 MMOL/L (ref 22–29)
CREAT SERPL-MCNC: 1.48 MG/DL (ref 0.57–1)
DEPRECATED RDW RBC AUTO: 48.9 FL (ref 37–54)
EGFRCR SERPLBLD CKD-EPI 2021: 33.7 ML/MIN/1.73
EOSINOPHIL # BLD AUTO: 0.35 10*3/MM3 (ref 0–0.4)
EOSINOPHIL NFR BLD AUTO: 3.7 % (ref 0.3–6.2)
ERYTHROCYTE [DISTWIDTH] IN BLOOD BY AUTOMATED COUNT: 15.6 % (ref 12.3–15.4)
GLUCOSE SERPL-MCNC: 98 MG/DL (ref 65–99)
HCT VFR BLD AUTO: 27.3 % (ref 34–46.6)
HGB BLD-MCNC: 8.8 G/DL (ref 12–15.9)
IMM GRANULOCYTES # BLD AUTO: 0.08 10*3/MM3 (ref 0–0.05)
IMM GRANULOCYTES NFR BLD AUTO: 0.9 % (ref 0–0.5)
LEFT KIDNEY WIDTH: 4.7 CM
LEFT RENAL UPPER PARENCHYMA MAX: 14 CM/S
LEFT RENAL UPPER PARENCHYMA MIN: 5 CM/S
LEFT RENAL UPPER PARENCHYMA RI: 0.64
LYMPHOCYTES # BLD AUTO: 2.24 10*3/MM3 (ref 0.7–3.1)
LYMPHOCYTES NFR BLD AUTO: 23.8 % (ref 19.6–45.3)
MCH RBC QN AUTO: 28 PG (ref 26.6–33)
MCHC RBC AUTO-ENTMCNC: 32.2 G/DL (ref 31.5–35.7)
MCV RBC AUTO: 86.9 FL (ref 79–97)
MONOCYTES # BLD AUTO: 0.65 10*3/MM3 (ref 0.1–0.9)
MONOCYTES NFR BLD AUTO: 6.9 % (ref 5–12)
NEUTROPHILS NFR BLD AUTO: 6.04 10*3/MM3 (ref 1.7–7)
NEUTROPHILS NFR BLD AUTO: 64.2 % (ref 42.7–76)
NRBC BLD AUTO-RTO: 0.1 /100 WBC (ref 0–0.2)
PLATELET # BLD AUTO: 273 10*3/MM3 (ref 140–450)
PMV BLD AUTO: 10.4 FL (ref 6–12)
POTASSIUM SERPL-SCNC: 4 MMOL/L (ref 3.5–5.2)
RBC # BLD AUTO: 3.14 10*6/MM3 (ref 3.77–5.28)
RIGHT RENAL UPPER PARENCHYMA MAX: 19 CM/S
RIGHT RENAL UPPER PARENCHYMA MIN: 7 CM/S
RIGHT RENAL UPPER PARENCHYMA RI: 0.63
SODIUM SERPL-SCNC: 138 MMOL/L (ref 136–145)
WBC NRBC COR # BLD AUTO: 9.41 10*3/MM3 (ref 3.4–10.8)

## 2024-01-17 PROCEDURE — 93975 VASCULAR STUDY: CPT

## 2024-01-17 PROCEDURE — 80048 BASIC METABOLIC PNL TOTAL CA: CPT | Performed by: HOSPITALIST

## 2024-01-17 PROCEDURE — 85025 COMPLETE CBC W/AUTO DIFF WBC: CPT | Performed by: HOSPITALIST

## 2024-01-17 PROCEDURE — 74176 CT ABD & PELVIS W/O CONTRAST: CPT

## 2024-01-17 PROCEDURE — 99233 SBSQ HOSP IP/OBS HIGH 50: CPT | Performed by: INTERNAL MEDICINE

## 2024-01-17 RX ORDER — AMLODIPINE BESYLATE 2.5 MG/1
2.5 TABLET ORAL EVERY 12 HOURS
Status: DISCONTINUED | OUTPATIENT
Start: 2024-01-17 | End: 2024-01-19 | Stop reason: HOSPADM

## 2024-01-17 RX ADMIN — CARVEDILOL 25 MG: 25 TABLET, FILM COATED ORAL at 10:54

## 2024-01-17 RX ADMIN — Medication 10 ML: at 10:54

## 2024-01-17 RX ADMIN — SPIRONOLACTONE 25 MG: 25 TABLET ORAL at 10:52

## 2024-01-17 RX ADMIN — ASPIRIN 81 MG: 81 TABLET, COATED ORAL at 10:51

## 2024-01-17 RX ADMIN — BUSPIRONE HYDROCHLORIDE 10 MG: 10 TABLET ORAL at 10:52

## 2024-01-17 RX ADMIN — CLOPIDOGREL BISULFATE 75 MG: 75 TABLET, FILM COATED ORAL at 10:52

## 2024-01-17 RX ADMIN — OXYCODONE HYDROCHLORIDE AND ACETAMINOPHEN 500 MG: 500 TABLET ORAL at 10:53

## 2024-01-17 RX ADMIN — ALLOPURINOL 300 MG: 300 TABLET ORAL at 10:51

## 2024-01-17 RX ADMIN — AMLODIPINE BESYLATE 2.5 MG: 2.5 TABLET ORAL at 10:51

## 2024-01-17 RX ADMIN — CARVEDILOL 25 MG: 25 TABLET, FILM COATED ORAL at 20:52

## 2024-01-17 RX ADMIN — VENLAFAXINE HYDROCHLORIDE 75 MG: 75 CAPSULE, EXTENDED RELEASE ORAL at 10:51

## 2024-01-17 RX ADMIN — ATORVASTATIN CALCIUM 80 MG: 80 TABLET, FILM COATED ORAL at 20:53

## 2024-01-17 RX ADMIN — Medication 1 TABLET: at 10:54

## 2024-01-17 RX ADMIN — Medication 10 ML: at 20:54

## 2024-01-17 RX ADMIN — AMLODIPINE BESYLATE 2.5 MG: 2.5 TABLET ORAL at 23:29

## 2024-01-17 RX ADMIN — BUSPIRONE HYDROCHLORIDE 10 MG: 10 TABLET ORAL at 20:53

## 2024-01-17 NOTE — SIGNIFICANT NOTE
01/17/24 1331   OTHER   Discipline physical therapist   Rehab Time/Intention   Session Not Performed patient unavailable for treatment  (Pt ROBBIE this AM to Vasc Lab. ROBBIE in PM for CT. PT may follow up later this afternoon, time permitting, for tx as appropriate.)   Therapy Assessment/Plan (PT)   Criteria for Skilled Interventions Met (PT) yes;meets criteria;skilled treatment is necessary   Recommendation   PT - Next Appointment 01/18/24

## 2024-01-17 NOTE — CONSULTS
Name: Antonette King ADMIT: 2024   : 1934  PCP: Tonny Willis MD    MRN: [id number] LOS: 3 days   AGE/SEX: 89 y.o. female  ROOM: [address]      Patient Care Team:  Tonny Willis MD as PCP - General (Family Medicine)  Chief Complaint   Patient presents with    Neuro Deficit(s)     CC: Abdominal aortic aneurysm evaluation.    Subjective     Inpatient Vascular Surgery Consult  Consult performed by: Kameron Carr MD  Consult ordered by: Abdias Arriaga MD        History of Present Illness  History generated from review of chart and verification of findings at bedside with patient and family.      History of present illness:    Patient is a 89 y.o.  female with CAD, dementia, hypertension, hyperlipidemia, previous CABG, and previous breast cancer who presents to the hospital yesterday after she was found down with altered mental status.  She was found to be generally weak.  She reports that she slipped out of her chair 2 days ago..  She reports dizziness prior to falling.  By the time she presented to the emergency department she was back to her neurologic baseline.  She does report history of right visual field blurriness which is been present for least 5 months since her previous fall.  Visual symptoms have been unchanged since previous 2 falls in the past week.  She denies upper or lower extremity weakness, numbness.  She denies headaches.      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 2021    History of blood clots     Hx of radiation therapy     , left breast ca    Hyperlipidemia 2021    Hypertension 2021    S/P CABG (coronary artery bypass graft) 10/12/2022     Past Surgical History:   Procedure Laterality Date    BREAST BIOPSY      BREAST  LUMPECTOMY Left 2011    CARDIAC SURGERY      CARPAL TUNNEL RELEASE      HYSTERECTOMY      TONSILLECTOMY       Family History   Problem Relation Age of Onset    Stroke Mother     Stroke Brother     Breast cancer Neg Hx        Social History     Tobacco Use    Smoking status: Former     Types: Cigarettes    Smokeless tobacco: Never   Vaping Use    Vaping Use: Never used   Substance Use Topics    Alcohol use: No     Comment: caffeine use    Drug use: No     Medications Prior to Admission   Medication Sig Dispense Refill Last Dose    allopurinol (ZYLOPRIM) 300 MG tablet Take 1 tablet by mouth Daily.   1/13/2024    ascorbic acid (VITAMIN C) 500 MG tablet Take 1 tablet by mouth Daily.   1/13/2024    aspirin 81 MG chewable tablet Chew 1 tablet Daily.   1/13/2024    atorvastatin (LIPITOR) 40 MG tablet Take 1 tablet by mouth Every Night. 30 tablet 0 1/13/2024    busPIRone (BUSPAR) 10 MG tablet Take 1 tablet by mouth 2 (Two) Times a Day.   1/13/2024    carvedilol (COREG) 12.5 MG tablet Take 1 tablet by mouth 2 (Two) Times a Day. 180 tablet 3 1/13/2024    clopidogrel (PLAVIX) 75 MG tablet Take 1 tablet by mouth Daily.   1/13/2024    hydrALAZINE (APRESOLINE) 25 MG tablet Take 1 tablet by mouth 2 (Two) Times a Day. 90 tablet 0 1/13/2024    isosorbide mononitrate (IMDUR) 30 MG 24 hr tablet Take 1 tablet by mouth Daily. 90 tablet 3 1/13/2024    losartan (COZAAR) 100 MG tablet Take 1 tablet by mouth Daily.   1/13/2024    multivitamin with minerals tablet tablet Take 1 tablet by mouth Daily.   1/13/2024    venlafaxine XR (EFFEXOR-XR) 75 MG 24 hr capsule Take 1 capsule by mouth Daily.   1/13/2024    vitamin D3 125 MCG (5000 UT) capsule capsule Take 1 capsule by mouth Daily.   1/13/2024    LORazepam (ATIVAN) 0.5 MG tablet Take half to 1 tab po bid prn anxiety 10 tablet 0 More than a month     allopurinol, 300 mg, Oral, Daily  amLODIPine, 2.5 mg, Oral, Q24H  ascorbic acid, 500 mg, Oral, Daily  aspirin, 81 mg, Oral, Daily  atorvastatin,  80 mg, Oral, Nightly  busPIRone, 10 mg, Oral, Q12H  carvedilol, 25 mg, Oral, BID  clopidogrel, 75 mg, Oral, Daily  multivitamin with minerals, 1 tablet, Oral, Daily  senna-docusate sodium, 2 tablet, Oral, BID  sodium chloride, 10 mL, Intravenous, Q12H  spironolactone, 25 mg, Oral, Daily  venlafaxine XR, 75 mg, Oral, Daily           acetaminophen **OR** acetaminophen **OR** acetaminophen    senna-docusate sodium **AND** polyethylene glycol **AND** bisacodyl **AND** bisacodyl    hydrALAZINE    LORazepam    nitroglycerin    ondansetron    [COMPLETED] Insert Peripheral IV **AND** sodium chloride    sodium chloride    sodium chloride  Prednisone    Objective     Physical Exam:  Physical Exam  Constitutional:       Appearance: She is well-developed.   Pulmonary:      Effort: Pulmonary effort is normal. No respiratory distress.   Abdominal:      General: There is no distension.      Palpations: Abdomen is soft.   Neurological:      Mental Status: She is alert and oriented to person, place, and time.     Large pulsatile abdominal mass consistent with known abdominal aortic aneurysm.  Nontender.    Vital Signs and Labs:  Vital Signs Patient Vitals for the past 24 hrs:   BP Temp Temp src Pulse Resp SpO2   01/17/24 1352 130/77 97.3 °F (36.3 °C) Oral 66 18 98 %   01/17/24 1031 (!) 189/105 97.5 °F (36.4 °C) Oral 81 18 98 %   01/17/24 0334 (!) 174/109 98.2 °F (36.8 °C) Oral 83 18 --   01/17/24 0000 150/90 98.1 °F (36.7 °C) Oral 83 18 --   01/16/24 1917 106/61 98.2 °F (36.8 °C) Oral 74 18 93 %   01/16/24 1631 (!) 185/100 98.2 °F (36.8 °C) Oral 72 -- 96 %     I/O:  I/O last 3 completed shifts:  In: 360 [P.O.:360]  Out: -     CBC    Results from last 7 days   Lab Units 01/17/24  0528 01/16/24  1029 01/15/24  0526 01/14/24  0642 01/14/24  0029   WBC 10*3/mm3 9.41 8.47 7.81 10.47 10.91*   HEMOGLOBIN g/dL 8.8* 8.8* 9.0* 10.5* 9.6*   PLATELETS 10*3/mm3 273 278 262 278 292     BMP   Results from last 7 days   Lab Units 01/17/24  0528  01/16/24  1029 01/15/24  0526 01/14/24  0642 01/14/24  0130   SODIUM mmol/L 138 139 141 136 142   POTASSIUM mmol/L 4.0 3.9 3.7 4.6 3.6   CHLORIDE mmol/L 104 104 105 98 105   CO2 mmol/L 26.0 26.0 25.0 20.0* 24.5   BUN mg/dL 25* 23 21 28* 27*   CREATININE mg/dL 1.48* 1.31* 1.56* 1.64* 1.53*   GLUCOSE mg/dL 98 113* 94 94 108*   MAGNESIUM mg/dL  --   --  2.0  --   --    PHOSPHORUS mg/dL  --   --  3.3  --   --      Radiology(recent) CT Abdomen Pelvis Without Contrast    Result Date: 1/17/2024  1. 10.6 cm infrarenal abdominal aortic aneurysm. There is dense material within the aneurysm suspicious for hemorrhage and there is displacement of intimal calcifications distally suspicious for dissection. Additionally, there is a 4.8 cm aneurysm involving the distal thoracic aorta which exhibits displacement of intimal calcifications. CT angiography would be the best means to further evaluate for aortic dissection and acute hemorrhage into the wall of the aorta. 2. Bilateral renal lesions, some of which are indeterminate and hyperdense. 3. Previous cholecystectomy. 4. 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  Findings discussed with Cayden, the charge nurse on 5S, on 01/17/2024 at 2 p.m.  Radiation dose reduction techniques were utilized, including automated exposure control and exposure modulation based on body size.   This report was finalized on 1/17/2024 2:12 PM by Dr. 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     Problem Points:  4:  Patient has a new problem, with additional work-up planned  Total problem points:4 or more    Data Points:  1:  I have reviewed or order clinical lab test  1:  I have reviewed or order radiology test (except heart catheterization or echo)  2:  I have personally and independently review of image, tracing, or specimen  Total data points:4 or more    Risk Points:  High:  Decision to proceed with DNR or de-escalation of care due to poor prognosis    MDM requires 2/3 (Problem points, Data points and Risk)  MDM Prob point Data point Risk   SF 1 1 Minimal   Low 2 2 Low   Mod 3 3 Moderate   High 4 4 High     Code requires 3/3 (MDM, History and Exam)  Code MDM History Exam Time   70391 SF/Low Detailed Detailed 30   44282 Mod Comprehensive Comprehensive 50   25041 High Comprehensive Comprehensive 70     Detailed history:  4 elements HPI or status of 3 chronic problems; 2-9 system ROS  Comprehensive:  4 elements HPI or status of 3 chronic problems;  10 system ROS    Detailed Exam:  12 findings from any organ system  Comprehensive Exam:  2 findings from each of 9 systems.   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 female patient with newly diagnosed large greater than 10 cm abdominal aortic aneurysm admitted for hypertension and falls.  Currently her abdominal aortic aneurysm is asymptomatic.  I checked a noncontrasted CT scan of the belly to get an overview of anatomy.  She has diffuse aortic ectasia with distal thoracic aortic aneurysm as well.  Due to her complex anatomy she is not suited for standard infrarenal abdominal aortic stent graft.  I had a long discussion with the patient, her son, and  niece who she lives with.  Due to her advanced age and medical comorbidities they have elected to not pursue aneurysmal mole repair.  I think this is a very reasonable decision given her medical comorbidities and advanced age.  They will come see me as an outpatient on an as-needed basis.    We also further discuss what they would want done if her aneurysm were to rupture.  They understand the extremely poor outcomes from a ruptured aneurysm in an 89-year-old with her medical comorbidities.  They would consider comfort care only at that time.      Would recommend blood pressure control with systolic blood pressure under 130s to reduce aneurysmal's stress.    I discussed the patients findings and my recommendations with patient, family, and nursing staff.    Kameron Carr MD  01/17/24  14:38 EST    Please call my office with any question: [phone]

## 2024-01-17 NOTE — PROGRESS NOTES
Discharge Planning Assessment  [facility]     Patient Name: Antonette King  MRN: [id number]  Today's Date: 1/17/2024    Admit Date: 1/14/2024    Plan: Home with family lives with her granddaughter Hailey. New referral to [facility] for rolling walker and compression thigh high stocking's   Discharge Needs Assessment    No documentation.                  Discharge Plan       Row Name 01/17/24 1250       Plan    Plan Home with family lives with her granddaughter Hailey. New referral to [facility] for rolling walker and compression thigh high stocking's    Plan Comments Spoke with patient and son Arturo Lambert at bedside for follow up discharge planning. Son requested a rolling walker call placed to [employer] and left a message and order is received. Call placed to [employer] for thigh high compression stockings. Adelina HAND                  Continued Care and Services - Admitted Since 1/14/2024       Durable Medical Equipment       Service Provider Request Status Selected Services Address Phone Fax Patient Preferred    [facility] Pending - Request Sent N/A [address] [address] [phone] --                  Expected Discharge Date and Time       Expected Discharge Date Expected Discharge Time    Jan 17, 2024            Demographic Summary    No documentation.                  Functional Status    No documentation.                  Psychosocial    No documentation.                  Abuse/Neglect    No documentation.                  Legal    No documentation.                  Substance Abuse    No documentation.                  Patient Forms    No documentation.                     Mia Travis, RN

## 2024-01-17 NOTE — DISCHARGE PLACEMENT REQUEST
"Antonette King (89 y.o. Female)       Date of Birth   04/08/1934    Social Security Number       Address   [address] [place] [address]    Home Phone   [phone]    MRN   [id number]       [facility]    Marital Status                               Admission Date   1/14/24    Admission Type   Emergency    Admitting Provider   Milind Romeo MD    Attending Provider   Abdias Arriaga MD    Department, Room/Bed   [address], [address]/1       Discharge Date       Discharge Disposition       Discharge Destination                                 Attending Provider: Abdias Arriaga MD    Allergies: Prednisone    Isolation: None   Infection: None   Code Status: CPR    Ht: 162 cm (63.78\")   Wt: 70 kg (154 lb 5.2 oz)    Admission Cmt: None   Principal Problem: TIA (transient ischemic attack) [G45.9]                   Active Insurance as of 1/14/2024       Primary Coverage       Payor Plan Insurance Group Employer/Plan Group    ANTHEM MEDICARE REPLACEMENT ANTHEM MEDICARE ADVANTAGE [id number]       Payor Plan Address Payor Plan Phone Number Payor Plan Fax Number Effective Dates    [address] [phone]  6/1/2023 - None Entered    [facility] [id number]         Subscriber Name Subscriber Birth Date Member ID       ANTONETTE KING 4/8/1934 [id number]                     Emergency Contacts        (Rel.) Home Phone Work Phone Mobile Phone    CHRISTINA ANTONIO (Daughter) -- -- [phone]    Kasey Lambert (Son) -- -- [phone]    Vanessa(step)Kristyn (Daughter) [phone] -- --    naveen Lambert (Son) [phone] -- --                "
(2/1): Mg 1.7L, (1/30) Alb 3.5wnl

## 2024-01-17 NOTE — PROGRESS NOTES
Name: Antonette King ADMIT: 2024   : 1934  PCP: Tonny Willis MD    MRN: [id number] LOS: 3 days   AGE/SEX: 89 y.o. female  ROOM: [facility]     Subjective   Subjective   Patient seen at bedside.       Objective   Objective   Vital Signs  Temp:  [97.3 °F (36.3 °C)-98.6 °F (37 °C)] 98.6 °F (37 °C)  Heart Rate:  [66-83] 66  Resp:  [18] 18  BP: (106-189)/() 160/96  SpO2:  [93 %-98 %] 97 %  on   ;   Device (Oxygen Therapy): room air  Body mass index is 26.67 kg/m².  Physical Exam  Vitals and nursing note reviewed.   Constitutional:       General: She is not in acute distress.     Appearance: She is obese. She is ill-appearing.   Cardiovascular:      Rate and Rhythm: Normal rate and regular rhythm.   Pulmonary:      Effort: No respiratory distress.      Breath sounds: Normal breath sounds.   Abdominal:      General: Bowel sounds are normal. There is no distension.      Palpations: Abdomen is soft.   Neurological:      General: No focal deficit present.      Mental Status: She is alert. Mental status is at baseline.       Results Review     I reviewed the patient's new clinical results.  Results from last 7 days   Lab Units 24  1029 01/15/24  0524  0642   WBC 10*3/mm3 9.41 8.47 7.81 10.47   HEMOGLOBIN g/dL 8.8* 8.8* 9.0* 10.5*   PLATELETS 10*3/mm3 273 278 262 278     Results from last 7 days   Lab Units 24  1029 01/15/24  0526 24  0642   SODIUM mmol/L 138 139 141 136   POTASSIUM mmol/L 4.0 3.9 3.7 4.6   CHLORIDE mmol/L 104 104 105 98   CO2 mmol/L 26.0 26.0 25.0 20.0*   BUN mg/dL 25* 23 21 28*   CREATININE mg/dL 1.48* 1.31* 1.56* 1.64*   GLUCOSE mg/dL 98 113* 94 94   EGFR mL/min/1.73 33.7* 39.0* 31.6* 29.8*     Results from last 7 days   Lab Units 01/15/24  0526 24  0130   ALBUMIN g/dL 3.2* 2.9*   BILIRUBIN mg/dL  --  0.2   ALK PHOS U/L  --  60   AST (SGOT) U/L  --  11   ALT (SGPT) U/L  --  7     Results from last 7 days   Lab Units  "01/17/24  0528 01/16/24  1029 01/15/24  0526 01/14/24  0642 01/14/24  0130   CALCIUM mg/dL 9.1 9.4 9.2 9.9 8.4*   ALBUMIN g/dL  --   --  3.2*  --  2.9*   MAGNESIUM mg/dL  --   --  2.0  --   --    PHOSPHORUS mg/dL  --   --  3.3  --   --        No results found for: \"HGBA1C\", \"POCGLU\"    CT Abdomen Pelvis Without Contrast    Result Date: 1/17/2024  1. 10.6 cm infrarenal abdominal aortic aneurysm. There is dense material within the aneurysm suspicious for hemorrhage and there is displacement of intimal calcifications distally suspicious for dissection. Additionally, there is a 4.8 cm aneurysm involving the distal thoracic aorta which exhibits displacement of intimal calcifications. CT angiography would be the best means to further evaluate for aortic dissection and acute hemorrhage into the wall of the aorta. 2. Bilateral renal lesions, some of which are indeterminate and hyperdense. 3. Previous cholecystectomy. 4. 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  Findings discussed with Cayden, the charge nurse on 5S, on 01/17/2024 at 2 p.m.  Radiation dose reduction techniques were utilized, including automated exposure control and exposure modulation based on body size.   This report was finalized on 1/17/2024 2:12 PM by Dr. Curly Riojas M.D on Workstation: [id number]       I have personally reviewed all medications:  Scheduled Medications  allopurinol, 300 mg, Oral, Daily  amLODIPine, 2.5 mg, Oral, Q12H  ascorbic acid, 500 mg, Oral, Daily  aspirin, 81 mg, Oral, Daily  atorvastatin, 80 mg, Oral, Nightly  busPIRone, 10 mg, Oral, Q12H  carvedilol, 25 mg, Oral, BID  clopidogrel, 75 mg, Oral, Daily  multivitamin with minerals, 1 tablet, Oral, Daily  senna-docusate sodium, 2 tablet, Oral, BID  sodium chloride, 10 mL, Intravenous, Q12H  spironolactone, 25 mg, Oral, Daily  venlafaxine XR, 75 mg, Oral, Daily    Infusions   Diet  Diet: Regular/House Diet; Texture: Regular Texture (IDDSI 7); Fluid Consistency: Thin (IDDSI 0)    I have " personally reviewed:  [x]  Laboratory   [x]  Microbiology   [x]  Radiology   [x]  EKG/Telemetry  [x]  Cardiology/Vascular   []  Pathology    []  Records       Assessment/Plan     Active Hospital Problems    Diagnosis  POA    **TIA (transient ischemic attack) [G45.9]  Yes    Cerebral aneurysm, nonruptured [I67.1]  Unknown    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       89 y.o. female admitted with TIA (transient ischemic attack).    PLAN  This is an 89-year-old lady with a history of hypertension dementia hyperlipidemia coronary artery disease who presents to the hospital with weakness and confusion and was admitted to our service to evaluate for TIA but sounds more consistent with syncope and orthostasis.   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  Will follow-up neurology recommendations today after review of her MRAs  -has AAA. Consult vascular Sx.      Abdias Arriaga MD  [facility]  01/17/24  18:09 EST

## 2024-01-17 NOTE — PROGRESS NOTES
LOS: 3 days   Patient Care Team:  Tonny Willis MD as PCP - General (Family Medicine)    Chief Complaint: Follow-up possible TIA, labile blood pressure, large AAA.    Interval History: She continues to have fluctuating blood pressures.  No chest pain or lightheadedness.  Found to have a very large infrarenal AAA.    Vital Signs:  Temp:  [97.3 °F (36.3 °C)-98.2 °F (36.8 °C)] 97.3 °F (36.3 °C)  Heart Rate:  [66-83] 66  Resp:  [18] 18  BP: (106-189)/() 130/77    Intake/Output Summary (Last 24 hours) at 1/17/2024 1444  Last data filed at 1/17/2024 1300  Gross per 24 hour   Intake 240 ml   Output 200 ml   Net 40 ml       Physical Exam:   General Appearance:    No acute distress, alert and oriented x4   Lungs:     Clear to auscultation bilaterally     Heart:    Regular rhythm and normal rate.  III/VI SM RUSB and LUSB.   Abdomen:     Soft, nontender, nondistended.    Extremities:   No clubbing, cyanosis, or edema.     Results Review:    Results from last 7 days   Lab Units 01/17/24  0528   SODIUM mmol/L 138   POTASSIUM mmol/L 4.0   CHLORIDE mmol/L 104   CO2 mmol/L 26.0   BUN mg/dL 25*   CREATININE mg/dL 1.48*   GLUCOSE mg/dL 98   CALCIUM mg/dL 9.1     Results from last 7 days   Lab Units 01/14/24  0334 01/14/24  0130   HSTROP T ng/L 69* 73*     Results from last 7 days   Lab Units 01/17/24  0528   WBC 10*3/mm3 9.41   HEMOGLOBIN g/dL 8.8*   HEMATOCRIT % 27.3*   PLATELETS 10*3/mm3 273     Results from last 7 days   Lab Units 01/14/24  0852 01/14/24  0029   INR  1.12* 1.23*   APTT seconds  --  29.7     Results from last 7 days   Lab Units 01/14/24  0642   CHOLESTEROL mg/dL 204*     Results from last 7 days   Lab Units 01/15/24  0526   MAGNESIUM mg/dL 2.0     Results from last 7 days   Lab Units 01/14/24  0642   CHOLESTEROL mg/dL 204*   TRIGLYCERIDES mg/dL 97   HDL CHOL mg/dL 58   LDL CHOL mg/dL 129*       I reviewed the patient's new clinical results.        Assessment:  1.  Possible TIA  2.  Systemic  hypertension with profound orthostatic hypotension (overall labile blood pressure)  3.  Syncope secondary to hypotension  4.  Multiple aneurysms on MRA, most prominently in the left ICA  5.  Coronary artery disease, status post CABG in approximately 2000 at [facility].  6.  Moderate aortic stenosis  7.  Chronic diastolic CHF  8.  Stage III chronic kidney disease  9.  Alzheimer's dementia  10.  Multifactorial anemia    Plan:  -Spoke with Dr. Arriaga.  During her renal artery ultrasound, she was found to have a large aortic aneurysm.  A subsequent CT confirmed a 10.6 cm infrarenal AAA.  There was also a 4.8 cm distal descending aorta aneurysm.  Vascular surgery has been consulted.  I am not sure if she is going to be a good candidate for any type of intervention here.    -The aorta findings make her blood pressure management even more difficult.  She is likely going to continue to have severely labile blood pressure.  This is not going to be easily regulated.  She will have highs and lows, and now that she has a large AAA, the high readings will put her at risk for rupture.  However, the low readings place her at risk for syncope and injury.    -I spoke to her son, and he is realistic.  They are very likely not going to pursue any type of surgical intervention.  He is aware that this is very complex, and if her AAA ruptures, this will mean instant death.    -I really think we are going to have to do the best that we can in this situation.  I would advocate for compression stockings to minimize the hypotension.  I am going to continue the carvedilol at 25 mg twice daily and the spironolactone at 25 mg/day.  I added amlodipine 2.5 mg/day yesterday, and I am going to increase this to twice daily today.    -No plans for intervention on the intracranial aneurysms per neurosurgery.    -She likely had a TIA.  She is on aspirin and Plavix per neurology, as well as statin therapy.    -Spoke with the patient earlier in the  day, and spoke with her son over the phone this afternoon.    Herman Acevedo MD  01/17/24  14:44 EST

## 2024-01-18 LAB
ANION GAP SERPL CALCULATED.3IONS-SCNC: 11.9 MMOL/L (ref 5–15)
BASOPHILS # BLD AUTO: 0.07 10*3/MM3 (ref 0–0.2)
BASOPHILS NFR BLD AUTO: 0.8 % (ref 0–1.5)
BUN SERPL-MCNC: 30 MG/DL (ref 8–23)
BUN/CREAT SERPL: 18.5 (ref 7–25)
CALCIUM SPEC-SCNC: 9.2 MG/DL (ref 8.6–10.5)
CHLORIDE SERPL-SCNC: 106 MMOL/L (ref 98–107)
CO2 SERPL-SCNC: 24.1 MMOL/L (ref 22–29)
CREAT SERPL-MCNC: 1.62 MG/DL (ref 0.57–1)
DEPRECATED RDW RBC AUTO: 48.3 FL (ref 37–54)
EGFRCR SERPLBLD CKD-EPI 2021: 30.2 ML/MIN/1.73
EOSINOPHIL # BLD AUTO: 0.37 10*3/MM3 (ref 0–0.4)
EOSINOPHIL NFR BLD AUTO: 4.3 % (ref 0.3–6.2)
ERYTHROCYTE [DISTWIDTH] IN BLOOD BY AUTOMATED COUNT: 15.5 % (ref 12.3–15.4)
GLUCOSE SERPL-MCNC: 100 MG/DL (ref 65–99)
HCT VFR BLD AUTO: 26.2 % (ref 34–46.6)
HGB BLD-MCNC: 8.5 G/DL (ref 12–15.9)
IMM GRANULOCYTES # BLD AUTO: 0.04 10*3/MM3 (ref 0–0.05)
IMM GRANULOCYTES NFR BLD AUTO: 0.5 % (ref 0–0.5)
LYMPHOCYTES # BLD AUTO: 2.4 10*3/MM3 (ref 0.7–3.1)
LYMPHOCYTES NFR BLD AUTO: 27.7 % (ref 19.6–45.3)
MCH RBC QN AUTO: 28 PG (ref 26.6–33)
MCHC RBC AUTO-ENTMCNC: 32.4 G/DL (ref 31.5–35.7)
MCV RBC AUTO: 86.2 FL (ref 79–97)
MONOCYTES # BLD AUTO: 0.66 10*3/MM3 (ref 0.1–0.9)
MONOCYTES NFR BLD AUTO: 7.6 % (ref 5–12)
NEUTROPHILS NFR BLD AUTO: 5.11 10*3/MM3 (ref 1.7–7)
NEUTROPHILS NFR BLD AUTO: 59.1 % (ref 42.7–76)
NRBC BLD AUTO-RTO: 0 /100 WBC (ref 0–0.2)
PLATELET # BLD AUTO: 251 10*3/MM3 (ref 140–450)
PMV BLD AUTO: 9.9 FL (ref 6–12)
POTASSIUM SERPL-SCNC: 4.5 MMOL/L (ref 3.5–5.2)
RBC # BLD AUTO: 3.04 10*6/MM3 (ref 3.77–5.28)
SODIUM SERPL-SCNC: 142 MMOL/L (ref 136–145)
WBC NRBC COR # BLD AUTO: 8.65 10*3/MM3 (ref 3.4–10.8)

## 2024-01-18 PROCEDURE — 99232 SBSQ HOSP IP/OBS MODERATE 35: CPT | Performed by: INTERNAL MEDICINE

## 2024-01-18 PROCEDURE — 97535 SELF CARE MNGMENT TRAINING: CPT

## 2024-01-18 PROCEDURE — 97530 THERAPEUTIC ACTIVITIES: CPT

## 2024-01-18 PROCEDURE — 80048 BASIC METABOLIC PNL TOTAL CA: CPT | Performed by: HOSPITALIST

## 2024-01-18 PROCEDURE — 85025 COMPLETE CBC W/AUTO DIFF WBC: CPT | Performed by: HOSPITALIST

## 2024-01-18 RX ADMIN — ALLOPURINOL 300 MG: 300 TABLET ORAL at 08:55

## 2024-01-18 RX ADMIN — DOCUSATE SODIUM 50MG AND SENNOSIDES 8.6MG 2 TABLET: 8.6; 5 TABLET, FILM COATED ORAL at 08:53

## 2024-01-18 RX ADMIN — SPIRONOLACTONE 25 MG: 25 TABLET ORAL at 08:54

## 2024-01-18 RX ADMIN — Medication 10 ML: at 20:48

## 2024-01-18 RX ADMIN — ATORVASTATIN CALCIUM 80 MG: 80 TABLET, FILM COATED ORAL at 20:47

## 2024-01-18 RX ADMIN — AMLODIPINE BESYLATE 2.5 MG: 2.5 TABLET ORAL at 11:38

## 2024-01-18 RX ADMIN — OXYCODONE HYDROCHLORIDE AND ACETAMINOPHEN 500 MG: 500 TABLET ORAL at 08:55

## 2024-01-18 RX ADMIN — BUSPIRONE HYDROCHLORIDE 10 MG: 10 TABLET ORAL at 20:47

## 2024-01-18 RX ADMIN — Medication 10 ML: at 09:09

## 2024-01-18 RX ADMIN — BUSPIRONE HYDROCHLORIDE 10 MG: 10 TABLET ORAL at 08:52

## 2024-01-18 RX ADMIN — VENLAFAXINE HYDROCHLORIDE 75 MG: 75 CAPSULE, EXTENDED RELEASE ORAL at 08:54

## 2024-01-18 RX ADMIN — CLOPIDOGREL BISULFATE 75 MG: 75 TABLET, FILM COATED ORAL at 08:52

## 2024-01-18 RX ADMIN — CARVEDILOL 25 MG: 25 TABLET, FILM COATED ORAL at 20:47

## 2024-01-18 RX ADMIN — AMLODIPINE BESYLATE 2.5 MG: 2.5 TABLET ORAL at 23:40

## 2024-01-18 RX ADMIN — ASPIRIN 81 MG: 81 TABLET, COATED ORAL at 08:54

## 2024-01-18 RX ADMIN — DOCUSATE SODIUM 50MG AND SENNOSIDES 8.6MG 2 TABLET: 8.6; 5 TABLET, FILM COATED ORAL at 20:47

## 2024-01-18 RX ADMIN — CARVEDILOL 25 MG: 25 TABLET, FILM COATED ORAL at 08:55

## 2024-01-18 RX ADMIN — Medication 1 TABLET: at 08:54

## 2024-01-18 NOTE — PROGRESS NOTES
Name: Antonette King ADMIT: 2024   : 1934  PCP: Tonny Willis MD    MRN: [id number] LOS: 4 days   AGE/SEX: 89 y.o. female  ROOM: [facility]     Subjective   Subjective   Patient is seen at bedside, no new complaints.       Objective   Objective   Vital Signs  Temp:  [97.7 °F (36.5 °C)-98.6 °F (37 °C)] 97.7 °F (36.5 °C)  Heart Rate:  [65-82] 65  Resp:  [16-18] 18  BP: (135-160)/(89-96) 135/89  SpO2:  [94 %-97 %] 95 %  on   ;   Device (Oxygen Therapy): room air  Body mass index is 26.67 kg/m².  Physical Exam  Vitals and nursing note reviewed.   Constitutional:       General: She is not in acute distress.     Appearance: She is obese. She is ill-appearing.   Cardiovascular:      Rate and Rhythm: Normal rate and regular rhythm.   Pulmonary:      Effort: No respiratory distress.      Breath sounds: Normal breath sounds.   Abdominal:      General: Bowel sounds are normal. There is no distension.      Palpations: Abdomen is soft.   Neurological:      General: No focal deficit present.      Mental Status: She is alert. Mental status is at baseline.       Results Review     I reviewed the patient's new clinical results.  Results from last 7 days   Lab Units 24  1029 01/15/24  0526   WBC 10*3/mm3 8.65 9.41 8.47 7.81   HEMOGLOBIN g/dL 8.5* 8.8* 8.8* 9.0*   PLATELETS 10*3/mm3 251 273 278 262     Results from last 7 days   Lab Units 24  0528 24  1029 01/15/24  0526   SODIUM mmol/L 142 138 139 141   POTASSIUM mmol/L 4.5 4.0 3.9 3.7   CHLORIDE mmol/L 106 104 104 105   CO2 mmol/L 24.1 26.0 26.0 25.0   BUN mg/dL 30* 25* 23 21   CREATININE mg/dL 1.62* 1.48* 1.31* 1.56*   GLUCOSE mg/dL 100* 98 113* 94   EGFR mL/min/1.73 30.2* 33.7* 39.0* 31.6*     Results from last 7 days   Lab Units 01/15/24  0526 24  0130   ALBUMIN g/dL 3.2* 2.9*   BILIRUBIN mg/dL  --  0.2   ALK PHOS U/L  --  60   AST (SGOT) U/L  --  11   ALT (SGPT) U/L  --  7     Results from  "last 7 days   Lab Units 01/18/24  0439 01/17/24  0528 01/16/24  1029 01/15/24  0526 01/14/24  0642 01/14/24  0130   CALCIUM mg/dL 9.2 9.1 9.4 9.2   < > 8.4*   ALBUMIN g/dL  --   --   --  3.2*  --  2.9*   MAGNESIUM mg/dL  --   --   --  2.0  --   --    PHOSPHORUS mg/dL  --   --   --  3.3  --   --     < > = values in this interval not displayed.       No results found for: \"HGBA1C\", \"POCGLU\"    CT Abdomen Pelvis Without Contrast    Result Date: 1/17/2024  1. 10.6 cm infrarenal abdominal aortic aneurysm. There is dense material within the aneurysm suspicious for hemorrhage and there is displacement of intimal calcifications distally suspicious for dissection. Additionally, there is a 4.8 cm aneurysm involving the distal thoracic aorta which exhibits displacement of intimal calcifications. CT angiography would be the best means to further evaluate for aortic dissection and acute hemorrhage into the wall of the aorta. 2. Bilateral renal lesions, some of which are indeterminate and hyperdense. 3. Previous cholecystectomy. 4. 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  Findings discussed with Cayden, the charge nurse on 5S, on 01/17/2024 at 2 p.m.  Radiation dose reduction techniques were utilized, including automated exposure control and exposure modulation based on body size.   This report was finalized on 1/17/2024 2:12 PM by Dr. Curly Riojas M.D on Workstation: [id number]       I have personally reviewed all medications:  Scheduled Medications  allopurinol, 300 mg, Oral, Daily  amLODIPine, 2.5 mg, Oral, Q12H  ascorbic acid, 500 mg, Oral, Daily  aspirin, 81 mg, Oral, Daily  atorvastatin, 80 mg, Oral, Nightly  busPIRone, 10 mg, Oral, Q12H  carvedilol, 25 mg, Oral, BID  clopidogrel, 75 mg, Oral, Daily  multivitamin with minerals, 1 tablet, Oral, Daily  senna-docusate sodium, 2 tablet, Oral, BID  sodium chloride, 10 mL, Intravenous, Q12H  spironolactone, 25 mg, Oral, Daily  venlafaxine XR, 75 mg, Oral, Daily    Infusions   " Diet  Diet: Regular/House Diet; Texture: Regular Texture (IDDSI 7); Fluid Consistency: Thin (IDDSI 0)    I have personally reviewed:  [x]  Laboratory   [x]  Microbiology   [x]  Radiology   [x]  EKG/Telemetry  [x]  Cardiology/Vascular   []  Pathology    []  Records       Assessment/Plan     Active Hospital Problems    Diagnosis  POA    **TIA (transient ischemic attack) [G45.9]  Yes    Cerebral aneurysm, nonruptured [I67.1]  Unknown    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       89 y.o. female admitted with TIA (transient ischemic attack).    PLAN  This is an 89-year-old lady with a history of hypertension dementia hyperlipidemia coronary artery disease who presents to the hospital with weakness and confusion and was admitted to our service to evaluate for TIA but sounds more consistent with syncope and orthostasis.   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  Will follow-up neurology recommendations today after review of her MRAs  -has AAA. Consult vascular Sx.  -Course of palliative care.         Abdias Arriaga MD  [facility]  01/18/24  16:39 EST

## 2024-01-18 NOTE — CONSULTS
Purpose of the visit was to evaluate for: goals of care/advanced care planning, support for patient/family, hospice referral/discussion, pain/symptom management, withdrawal of interventions, transfer to comfort care bed/unit, and comfort care. Spoke with MD, RN, and CCP as well as patient and family and discussed palliative care, goals of care, care options, resuscitation status, [facility], [facility] scattered bed status, and discharge options.      Assessment:  Patient is palliative care appropriate given: suspected TIA, HTN and orthostatic hypotension, large AAA suspicious for hemorrhage and dissection, CAD s/p CABG, CHF, CKD, Alzheimer's dementia, general debility.  Upon assessment this patient is alert and oriented to self, unable to fully assess orientation and situational understanding due to extreme [place], nonverbal indicators of discomfort absent.  PPS: 50%    Recommendations/Plan: [facility] referral for outpatient support, change code status to DNR/DNI, facilitate completion of EMS DNR.    Other Comments: I met with this patient and her son Arturo at bedside and spoke with her son Kasey and granddaughter/caregiver Hailey via phone to offer support and to discuss their goals of care. The patient is very [place] and was unable to participate in the conversation. The family all voiced their understanding that the patient likely had a TIA, but that because she has a large AAA at risk for dissection, they must make every effort to prevent hypertension while also managing the syncope and weakness associated with orthostatic hypotension. Their goal is to continue to seek medical interventions for wellness and to provide patient care at home. They have requested Home Health, CCP notified. We discussed [facility] and [facility] as outpatient resources for support with symptom management and disease progression, and while they recognize that the patient is medically fragile and do not wish to pursue aggressive measures such as  surgery, CPR, cardioversion or intubation to artificially prolong her life, they feel that [facility] is not in line with their goals of care. [facility] referral placed at their request. ACP consult place for completion of EMS DNR. Code status changed to DNR/DNI/no cardioversion.  I discussed home preparedness in the event of aneurysm dissection with caregiver/granddaughter Hailey, who has medical experience.     I advised all of our availability and all questions answered.  Palliative Care will sign off, please re consult if further support is needed.     Rebecca Varghese RN

## 2024-01-18 NOTE — PLAN OF CARE
Goal Outcome Evaluation:           Progress: improving  Outcome Evaluation: Patient participated in OT session today, motivated to return home. Patient performed bed mobility with SBA, performed sit to stand and bed to chair transfer with CGA. Patient able to ambulate within hospital room with CGA using rolling walker with cues to correct posture, no episodes of LOB. Patient and son instructed in donning/doffing jobst stockings with teach back. Patient will continue to benefit form [facility]ileld OT to address current functional deficits, recommend home with HH to follow.      Anticipated Discharge Disposition (OT): home with assist, home with 24/7 care

## 2024-01-18 NOTE — PLAN OF CARE
Goal Outcome Evaluation:    Problem: Hypertension Comorbidity  Goal: Blood Pressure in Desired Range  Outcome: Ongoing, Progressing  Intervention: Maintain Blood Pressure Management  Recent Flowsheet Documentation  Taken 1/18/2024 1800 by Osmin Alfaro RN  Medication Review/Management: medications reviewed  Taken 1/18/2024 1600 by Osmin Alfaro RN  Medication Review/Management: medications reviewed  Taken 1/18/2024 1425 by Osmin Alfaro RN  Medication Review/Management: medications reviewed  Taken 1/18/2024 1234 by Osmin Alfaro RN  Medication Review/Management: medications reviewed  Taken 1/18/2024 1000 by Osmin Alfaro RN  Medication Review/Management: medications reviewed  Taken 1/18/2024 0810 by Osmin Alfaro RN  Medication Review/Management: medications reviewed   Plan of Care Reviewed With: patient alert, room air, ambulated outside the room, compression stockings on, no c/o on this shift                                             Orders already in for BMP. Please advise if any other lab orders are needed.     Future Appointments   Date Time Provider Nik Soriano   12/21/2021  9:15 AM REF [facility] Ref [facility]c   12/29/2021  9:30 AM Thor Newton MD [facility]

## 2024-01-18 NOTE — THERAPY TREATMENT NOTE
Patient Name: Antonette King  : 1934    MRN: [id number]                              Today's Date: 2024       Admit Date: 2024    Visit Dx:     ICD-10-CM ICD-9-CM   1. TIA (transient ischemic attack)  G45.9 435.9   2. Elevated troponin  R79.89 790.6   3. 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     Past Medical History:   Diagnosis Date    Aortic ectasia, thoracic     Breast cancer     left lumpectomy    CAD (coronary artery disease)     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 2021    History of blood clots     Hx of radiation therapy     , left breast ca    Hyperlipidemia 2021    Hypertension 2021    S/P CABG (coronary artery bypass graft) 10/12/2022     Past Surgical History:   Procedure Laterality Date    BREAST BIOPSY      BREAST LUMPECTOMY Left 2011    CARDIAC SURGERY      CARPAL TUNNEL RELEASE      HYSTERECTOMY      TONSILLECTOMY        General Information       Row Name 24 1425          OT Time and Intention    Document Type therapy note (daily note)  -     Mode of Treatment individual therapy;occupational therapy  -       Row Name 24 1425          General Information    Patient Profile Reviewed yes  -     Prior Level of Function independent:  -     Existing Precautions/Restrictions fall  -     Barriers to Rehab hearing deficit;medically complex  -        Row Name 01/18/24 1425          Living Environment    People in Home grandparent(s)  -Count includes the [facility] Name 01/18/24 1425          Home Main Entrance    Number of Stairs, Main Entrance three  -     Stair Railings, Main Entrance railings safe and in good condition  -Count includes the [facility] Name 01/18/24 1425          Stairs Within Home, Primary    Number of Stairs, Within Home, Primary twelve  -Count includes the [facility] Name 01/18/24 1425          Cognition    Orientation Status (Cognition) oriented x 4  -Count includes the [facility] Name 01/18/24 1425          Safety Issues, Functional Mobility    Safety Issues Affecting Function (Mobility) insight into deficits/self-awareness  -     Impairments Affecting Function (Mobility) endurance/activity tolerance;strength;balance  -               User Key  (r) = Recorded By, (t) = Taken By, (c) = Cosigned By      Initials Name Provider Type     Cari Vazquez, OT Occupational Therapist                     Mobility/ADL's       Row Name 01/18/24 1425          Bed Mobility    Bed Mobility scooting/bridging;supine-sit;sit-supine  -     Scooting/Bridging [place] (Bed Mobility) standby assist;verbal cues  -     Supine-Sit [place] (Bed Mobility) standby assist  -     Sit-Supine [place] (Bed Mobility) standby assist  -Count includes the [facility] Name 01/18/24 1425          Transfers    Transfers sit-stand transfer  -Count includes the [facility] Name 01/18/24 1425          Bed-Chair Transfer    Bed-Chair [place] (Transfers) contact guard;1 person assist  -     Assistive Device (Bed-Chair Transfers) walker, front-wheeled  -Count includes the [facility] Name 01/18/24 1425          Sit-Stand Transfer    Sit-Stand [place] (Transfers) contact guard;verbal cues  -     Assistive Device (Sit-Stand Transfers) walker, front-wheeled  -Count includes the [facility] Name 01/18/24 1425          Functional Mobility    Functional Mobility- Ind. Level contact guard assist  -     Patient was able to Ambulate yes  -Count includes the [facility] Name 01/18/24 1425          Activities  of Daily Living    BADL Assessment/Intervention lower body dressing  -       Row Name 01/18/24 1425          Lower Body Dressing Assessment/Training    [place] Level (Lower Body Dressing) socks;doff;don;verbal cues  Instructed family on donning/doffing jobst stockings  -               User Key  (r) = Recorded By, (t) = Taken By, (c) = Cosigned By      Initials Name Provider Type     Cari Vazquez OT Occupational Therapist                   Obj/Interventions       [facility] Name 01/18/24 1426          Vision Assessment/Intervention    Visual Impairment/Limitations WFL  -[facility] Name 01/18/24 1426          Motor Skills    Motor Skills functional endurance  -     Functional Endurance Fair  -[facility] Name 01/18/24 1426          Balance    Balance Assessment sitting static balance;sit to stand dynamic balance;standing static balance;standing dynamic balance  -     Static Sitting Balance modified independence  -     Dynamic Sitting Balance modified independence  -     Position, Sitting Balance unsupported;sitting edge of bed  -     Sit to Stand Dynamic Balance contact guard  -     Static Standing Balance contact guard  -     Dynamic Standing Balance contact guard  -     Position/Device Used, Standing Balance supported;walker, front-wheeled  -     Balance Interventions standing;sitting;occupation based/functional task  -               User Key  (r) = Recorded By, (t) = Taken By, (c) = Cosigned By      Initials Name Provider Type     Cari Vazquez OT Occupational Therapist                   Goals/Plan    No documentation.                  Clinical Impression       [facility] Name 01/18/24 1426          Pain Assessment    Pretreatment Pain Rating 0/10 - no pain  -     Posttreatment Pain Rating 0/10 - no pain  -LH       Row Name 01/18/24 1426          Plan of Care Review    Progress improving  -     Outcome Evaluation Patient participated in OT session today, motivated to return home. Patient  performed bed mobility with SBA, performed sit to stand and bed to chair transfer with CGA. Patient able to ambulate within hospital room with CGA using rolling walker with cues to correct posture, no episodes of LOB. Patient and son instructed in donning/doffing jobst stockings with teach back. Patient will continue to benefit form skileld OT to address current functional deficits, recommend home with HH to follow.  -       Row Name 01/18/24 1426          Therapy Assessment/Plan (OT)    Rehab Potential (OT) good, to achieve stated therapy goals  -     Criteria for Skilled Therapeutic Interventions Met (OT) yes;skilled treatment is necessary  -     Therapy Frequency (OT) 3 times/wk  -       Row Name 01/18/24 1426          Therapy Plan Review/Discharge Plan (OT)    Anticipated Discharge Disposition (OT) home with assist;home with 24/7 care  -       Row Name 01/18/24 1426          Positioning and Restraints    Pre-Treatment Position in bed  -     Post Treatment Position chair  -     In Chair reclined;call light within reach;encouraged to call for assist;exit alarm on;with family/caregiver  -               User Key  (r) = Recorded By, (t) = Taken By, (c) = Cosigned By      Initials Name Provider Type     Cari Vazquez, OT Occupational Therapist                   Outcome Measures       Row Name 01/18/24 142          How much help from another is currently needed...    Putting on and taking off regular lower body clothing? 3  -LH     Bathing (including washing, rinsing, and drying) 3  -LH     Toileting (which includes using toilet bed pan or urinal) 3  -LH     Putting on and taking off regular upper body clothing 4  -LH     Taking care of personal grooming (such as brushing teeth) 4  -LH     Eating meals 4  -LH     AM-PAC 6 Clicks Score (OT) 21  -       Row Name 01/18/24 142          Modified Philipp Scale    Modified [place] Scale 2 - Slight disability.  Unable to carry out all previous activities but  able to look after own affairs without assistance.  -       Row Name 01/18/24 1428          Functional Assessment    Outcome Measure Options AM-PAC 6 Clicks Daily Activity (OT);Modified [place]  -               User Key  (r) = Recorded By, (t) = Taken By, (c) = Cosigned By      Initials Name Provider Type     Cari Vazquez OT Occupational Therapist                    Occupational Therapy Education       Title: PT OT SLP Therapies (In Progress)       Topic: Occupational Therapy (In Progress)       Point: ADL training (In Progress)       Description:   Instruct learner(s) on proper safety adaptation and remediation techniques during self care or transfers.   Instruct in proper use of assistive devices.                  Learning Progress Summary             Patient Acceptance, E, NR by TOMMY at 1/17/2024 1312    Acceptance, E, VU by CHRISTIANE at 1/16/2024 1701    Acceptance, E, VU by SHAD at 1/15/2024 0908    Acceptance, E, VU by ELI at 1/14/2024 1146   Family Acceptance, E, VU by CHRISTIANE at 1/16/2024 1701    Acceptance, E, VU by ELI at 1/14/2024 1146                         Point: Home exercise program (In Progress)       Description:   Instruct learner(s) on appropriate technique for monitoring, assisting and/or progressing therapeutic exercises/activities.                  Learning Progress Summary             Patient Acceptance, E, NR by TOMMY at 1/17/2024 1312    Acceptance, E, VU by CHRISTIANE at 1/16/2024 1701    Acceptance, E, VU by SHAD at 1/15/2024 0908    Acceptance, E, VU by ELI at 1/14/2024 1146   Family Acceptance, E, VU by CHRISTIANE at 1/16/2024 1701    Acceptance, E, VU by ELI at 1/14/2024 1146                         Point: Precautions (In Progress)       Description:   Instruct learner(s) on prescribed precautions during self-care and functional transfers.                  Learning Progress Summary             Patient Acceptance, E, NR by TOMMY at 1/17/2024 1312    Acceptance, E, VU by CHRISTIANE at 1/16/2024 1701    Acceptance, E, VU by SHAD at  1/15/2024 0908    Acceptance, E, VU by ELI at 1/14/2024 1146   Family Acceptance, E, VU by CHRISTIANE at 1/16/2024 1701    Acceptance, E, VU by ELI at 1/14/2024 1146                         Point: Body mechanics (In Progress)       Description:   Instruct learner(s) on proper positioning and spine alignment during self-care, functional mobility activities and/or exercises.                  Learning Progress Summary             Patient Acceptance, E, NR by TOMMY at 1/17/2024 1312    Acceptance, E, VU by CHRISTIANE at 1/16/2024 1701    Acceptance, E, VU by SHAD at 1/15/2024 0908    Acceptance, E, VU by ELI at 1/14/2024 1146   Family Acceptance, E, VU by CHRISTIANE at 1/16/2024 1701    Acceptance, E, VU by ELI at 1/14/2024 1146                                         User Key       Initials Effective Dates Name Provider Type Discipline    SHAD 08/02/22 -  Ismael Smith OT Occupational Therapist OT    TOMMY 09/08/22 -  Osmin Alfaro, RN Registered Nurse Nurse    CHRISTIANE 10/10/23 -  Alvarez Hughes, RN Registered Nurse Nurse    ELI 10/12/23 -  Renea Chandler OT Occupational Therapist OT                  OT Recommendation and Plan  Therapy Frequency (OT): 3 times/wk  Plan of Care Review  Progress: improving  Outcome Evaluation: Patient participated in OT session today, motivated to return home. Patient performed bed mobility with SBA, performed sit to stand and bed to chair transfer with CGA. Patient able to ambulate within hospital room with CGA using rolling walker with cues to correct posture, no episodes of LOB. Patient and son instructed in donning/doffing jobst stockings with teach back. Patient will continue to benefit form skileld OT to address current functional deficits, recommend home with HH to follow.     Time Calculation:         Time Calculation- OT       Row Name 01/18/24 1428             Time Calculation- OT    OT Start Time 1348  -      OT Stop Time 1412  -      OT Time Calculation (min) 24 min  -      Total Timed Code Minutes- OT 24  minute(s)  -      OT Received On 01/18/24  -      OT - Next Appointment 01/22/24  -         Timed Charges    41544 - OT Therapeutic Activity Minutes 10  -      59842 - OT Self Care/Mgmt Minutes 14  -         Total Minutes    Timed Charges Total Minutes 24  -       Total Minutes 24  -                User Key  (r) = Recorded By, (t) = Taken By, (c) = Cosigned By      Initials Name Provider Type     Cari Vazquez OT Occupational Therapist                  Therapy Charges for Today       Code Description Service Date Service Provider Modifiers Qty    85593170013  OT THERAPEUTIC ACT EA 15 MIN 1/18/2024 Cari Vazquez, OT GO 1    [id number]  OT SELF CARE/MGMT/TRAIN EA 15 MIN 1/18/2024 Cari Vazquez OT GO 1                 Cari Vazquez OT  1/18/2024

## 2024-01-18 NOTE — PROGRESS NOTES
LOS: 4 days   Patient Care Team:  Tonny Willis MD as PCP - General (Family Medicine)    Chief Complaint: Follow-up possible TIA, labile blood pressure, large AAA.    Interval History: Pressure slightly more consistent.  She had no complaints today.  No chest pain or lightheadedness.    Vital Signs:  Temp:  [97.7 °F (36.5 °C)-98.6 °F (37 °C)] 97.7 °F (36.5 °C)  Heart Rate:  [65-82] 65  Resp:  [16-18] 18  BP: (135-160)/(89-96) 135/89    Intake/Output Summary (Last 24 hours) at 1/18/2024 1520  Last data filed at 1/18/2024 1125  Gross per 24 hour   Intake 240 ml   Output 1050 ml   Net -810 ml       Physical Exam:   General Appearance:    No acute distress, alert and oriented x4   Lungs:     Clear to auscultation bilaterally     Heart:    Regular rhythm and normal rate.  III/VI SM RUSB and LUSB.   Abdomen:     Soft, nontender, nondistended.    Extremities:   No clubbing, cyanosis, or edema.     Results Review:    Results from last 7 days   Lab Units 01/18/24  0439   SODIUM mmol/L 142   POTASSIUM mmol/L 4.5   CHLORIDE mmol/L 106   CO2 mmol/L 24.1   BUN mg/dL 30*   CREATININE mg/dL 1.62*   GLUCOSE mg/dL 100*   CALCIUM mg/dL 9.2     Results from last 7 days   Lab Units 01/14/24  0334 01/14/24  0130   HSTROP T ng/L 69* 73*     Results from last 7 days   Lab Units 01/18/24  0439   WBC 10*3/mm3 8.65   HEMOGLOBIN g/dL 8.5*   HEMATOCRIT % 26.2*   PLATELETS 10*3/mm3 251     Results from last 7 days   Lab Units 01/14/24  0852 01/14/24  0029   INR  1.12* 1.23*   APTT seconds  --  29.7     Results from last 7 days   Lab Units 01/14/24  0642   CHOLESTEROL mg/dL 204*     Results from last 7 days   Lab Units 01/15/24  0526   MAGNESIUM mg/dL 2.0     Results from last 7 days   Lab Units 01/14/24  0642   CHOLESTEROL mg/dL 204*   TRIGLYCERIDES mg/dL 97   HDL CHOL mg/dL 58   LDL CHOL mg/dL 129*       I reviewed the patient's new clinical results.        Assessment:  1.  Possible TIA  2.  Systemic hypertension with profound  orthostatic hypotension (overall labile blood pressure)  3.  Syncope secondary to hypotension  4.  Multiple aneurysms on MRA, most prominently in the left ICA  5.  Coronary artery disease, status post CABG in approximately 2000 at [facility].  6.  Moderate aortic stenosis  7.  Chronic diastolic CHF  8.  Stage III chronic kidney disease  9.  Alzheimer's dementia  10.  Multifactorial anemia    Plan:  -Not a good surgical candidate per vascular surgery.  She is not a candidate for a stent graft because of her anatomy.  She would be unlikely to recover from an open abdominal aortic aneurysm repair.    -The aorta findings make her blood pressure management even more difficult.  She is likely going to continue to have severely labile blood pressure, although it is better today.     -I really think we are going to have to do the best that we can in this situation.  I would recommend compression stockings to minimize the hypotension.  I am going to continue the carvedilol at 25 mg twice daily and the spironolactone at 25 mg/day.  Continue amlodipine 2.5 mg twice daily.    -No plans for intervention on the intracranial aneurysms per neurosurgery.    -She likely had a TIA.  She is on aspirin and Plavix per neurology, as well as statin therapy.    -Her son is aware of the gravity of the situation.  Also spoke with Dr. Arriaga.  He is likely going to be discharged today.    Herman Acevedo MD  01/18/24  15:20 EST

## 2024-01-18 NOTE — PLAN OF CARE
Goal Outcome Evaluation:    Problem: Hypertension Comorbidity  Goal: Blood Pressure in Desired Range  Outcome: Ongoing, Progressing  Intervention: Maintain Blood Pressure Management  Recent Flowsheet Documentation  Taken 1/17/2024 1800 by Osmin Alfaro RN  Medication Review/Management: medications reviewed  Taken 1/17/2024 1600 by Osmin Alfaro RN  Medication Review/Management: medications reviewed  Taken 1/17/2024 1430 by Osmin Alfaro RN  Medication Review/Management: medications reviewed  Taken 1/17/2024 1200 by Osmin Alfaro RN  Medication Review/Management: medications reviewed  Taken 1/17/2024 1023 by Osmin Alfaro RN  Medication Review/Management: medications reviewed   Plan of Care Reviewed With: patient

## 2024-01-19 ENCOUNTER — READMISSION MANAGEMENT (OUTPATIENT)
Dept: CALL CENTER | Facility: HOSPITAL | Age: 89
End: 2024-01-19
Payer: MEDICARE

## 2024-01-19 VITALS
WEIGHT: 154.32 LBS | HEART RATE: 65 BPM | RESPIRATION RATE: 18 BRPM | BODY MASS INDEX: 26.35 KG/M2 | DIASTOLIC BLOOD PRESSURE: 94 MMHG | SYSTOLIC BLOOD PRESSURE: 136 MMHG | TEMPERATURE: 97.9 F | HEIGHT: 64 IN | OXYGEN SATURATION: 94 %

## 2024-01-19 LAB
ANION GAP SERPL CALCULATED.3IONS-SCNC: 10 MMOL/L (ref 5–15)
BASOPHILS # BLD AUTO: 0.05 10*3/MM3 (ref 0–0.2)
BASOPHILS NFR BLD AUTO: 0.6 % (ref 0–1.5)
BUN SERPL-MCNC: 31 MG/DL (ref 8–23)
BUN/CREAT SERPL: 19.4 (ref 7–25)
CALCIUM SPEC-SCNC: 9.2 MG/DL (ref 8.6–10.5)
CHLORIDE SERPL-SCNC: 102 MMOL/L (ref 98–107)
CO2 SERPL-SCNC: 26 MMOL/L (ref 22–29)
CREAT SERPL-MCNC: 1.6 MG/DL (ref 0.57–1)
DEPRECATED RDW RBC AUTO: 46.7 FL (ref 37–54)
EGFRCR SERPLBLD CKD-EPI 2021: 30.7 ML/MIN/1.73
EOSINOPHIL # BLD AUTO: 0.36 10*3/MM3 (ref 0–0.4)
EOSINOPHIL NFR BLD AUTO: 4.3 % (ref 0.3–6.2)
ERYTHROCYTE [DISTWIDTH] IN BLOOD BY AUTOMATED COUNT: 15.2 % (ref 12.3–15.4)
GLUCOSE SERPL-MCNC: 102 MG/DL (ref 65–99)
HCT VFR BLD AUTO: 25.4 % (ref 34–46.6)
HGB BLD-MCNC: 8.3 G/DL (ref 12–15.9)
IMM GRANULOCYTES # BLD AUTO: 0.04 10*3/MM3 (ref 0–0.05)
IMM GRANULOCYTES NFR BLD AUTO: 0.5 % (ref 0–0.5)
LYMPHOCYTES # BLD AUTO: 2.04 10*3/MM3 (ref 0.7–3.1)
LYMPHOCYTES NFR BLD AUTO: 24.1 % (ref 19.6–45.3)
MCH RBC QN AUTO: 27.9 PG (ref 26.6–33)
MCHC RBC AUTO-ENTMCNC: 32.7 G/DL (ref 31.5–35.7)
MCV RBC AUTO: 85.5 FL (ref 79–97)
MONOCYTES # BLD AUTO: 0.72 10*3/MM3 (ref 0.1–0.9)
MONOCYTES NFR BLD AUTO: 8.5 % (ref 5–12)
NEUTROPHILS NFR BLD AUTO: 5.25 10*3/MM3 (ref 1.7–7)
NEUTROPHILS NFR BLD AUTO: 62 % (ref 42.7–76)
NRBC BLD AUTO-RTO: 0 /100 WBC (ref 0–0.2)
PLATELET # BLD AUTO: 253 10*3/MM3 (ref 140–450)
PMV BLD AUTO: 10.3 FL (ref 6–12)
POTASSIUM SERPL-SCNC: 4.7 MMOL/L (ref 3.5–5.2)
RBC # BLD AUTO: 2.97 10*6/MM3 (ref 3.77–5.28)
SODIUM SERPL-SCNC: 138 MMOL/L (ref 136–145)
WBC NRBC COR # BLD AUTO: 8.46 10*3/MM3 (ref 3.4–10.8)

## 2024-01-19 PROCEDURE — 80048 BASIC METABOLIC PNL TOTAL CA: CPT | Performed by: HOSPITALIST

## 2024-01-19 PROCEDURE — 85025 COMPLETE CBC W/AUTO DIFF WBC: CPT | Performed by: HOSPITALIST

## 2024-01-19 RX ORDER — SPIRONOLACTONE 25 MG/1
25 TABLET ORAL DAILY
Qty: 30 TABLET | Refills: 0 | Status: SHIPPED | OUTPATIENT
Start: 2024-01-20 | End: 2024-01-21 | Stop reason: HOSPADM

## 2024-01-19 RX ORDER — AMLODIPINE BESYLATE 2.5 MG/1
2.5 TABLET ORAL EVERY 12 HOURS
Qty: 60 TABLET | Refills: 0 | Status: SHIPPED | OUTPATIENT
Start: 2024-01-19 | End: 2024-02-18

## 2024-01-19 RX ORDER — CARVEDILOL 25 MG/1
25 TABLET ORAL 2 TIMES DAILY
Qty: 60 TABLET | Refills: 0 | Status: SHIPPED | OUTPATIENT
Start: 2024-01-19

## 2024-01-19 RX ADMIN — ALLOPURINOL 300 MG: 300 TABLET ORAL at 08:51

## 2024-01-19 RX ADMIN — CLOPIDOGREL BISULFATE 75 MG: 75 TABLET, FILM COATED ORAL at 08:52

## 2024-01-19 RX ADMIN — OXYCODONE HYDROCHLORIDE AND ACETAMINOPHEN 500 MG: 500 TABLET ORAL at 08:52

## 2024-01-19 RX ADMIN — DOCUSATE SODIUM 50MG AND SENNOSIDES 8.6MG 2 TABLET: 8.6; 5 TABLET, FILM COATED ORAL at 08:52

## 2024-01-19 RX ADMIN — Medication 1 TABLET: at 08:51

## 2024-01-19 RX ADMIN — Medication 10 ML: at 08:52

## 2024-01-19 RX ADMIN — ASPIRIN 81 MG: 81 TABLET, COATED ORAL at 08:52

## 2024-01-19 RX ADMIN — BUSPIRONE HYDROCHLORIDE 10 MG: 10 TABLET ORAL at 08:52

## 2024-01-19 RX ADMIN — CARVEDILOL 25 MG: 25 TABLET, FILM COATED ORAL at 08:51

## 2024-01-19 RX ADMIN — VENLAFAXINE HYDROCHLORIDE 75 MG: 75 CAPSULE, EXTENDED RELEASE ORAL at 08:52

## 2024-01-19 RX ADMIN — AMLODIPINE BESYLATE 2.5 MG: 2.5 TABLET ORAL at 11:41

## 2024-01-19 RX ADMIN — SPIRONOLACTONE 25 MG: 25 TABLET ORAL at 08:51

## 2024-01-19 NOTE — OUTREACH NOTE
Prep Survey      Flowsheet Row Responses   [facility] facility patient discharged from? [place]   Is LACE score < 7 ? No   Eligibility Readm Mgmt   Discharge diagnosis TIA (transient ischemic attack)   Does the patient have one of the following disease processes/diagnoses(primary or secondary)? Stroke   Does the patient have Home health ordered? Yes   What [facility]   Prep survey completed? Yes            Fatmata CORONA - Registered Nurse

## 2024-01-19 NOTE — PLAN OF CARE
Problem: Fall Injury Risk  Goal: Absence of Fall and Fall-Related Injury  Intervention: Identify and Manage Contributors  Recent Flowsheet Documentation  Taken 1/19/2024 0249 by Brianne Xavier, RN  Medication Review/Management: medications reviewed  Taken 1/19/2024 0013 by Brianne Xavier, RN  Medication Review/Management: medications reviewed  Taken 1/18/2024 2234 by Brianne Xavier, RN  Medication Review/Management: medications reviewed  Taken 1/18/2024 2052 by Brianne Xavier, RN  Medication Review/Management: medications reviewed     Plan of care reviewed with: Patient and family at bedside  Progress: On-going  Goal Outcome Evaluation: Patient alert and oriented on room air. Patient BP monitored and no PRN given this shift. No complaints of pain/shortness of breathe. Falls precaution maintained, bed in low position and call light within reach.

## 2024-01-19 NOTE — CASE MANAGEMENT/SOCIAL WORK
Continued Stay Note  [facility]     Patient Name: Antonette King  MRN: [id number]  Today's Date: 1/19/2024    Admit Date: 1/14/2024    Plan: Home with assist of family and [facility] .   Discharge Plan       Row Name 01/19/24 1204       Plan    Plan Home with assist of family and [facility] .    Plan Comments DC orders noted.  S/W [facility] - they have accepted patient.  She was notified of DC today - they will followup with pt/ family.  Order for walker noted.  Yon/ Joe notified and she will deliver a walker.  [facility] consult has been sent by [facility] care - s/w Cari/ Yenny - they will followup with pt's family.    Final Note DC home with assist of family and [facility] .  [facility] will also followup with pt/ family.                   Discharge Codes    No documentation.                 Expected Discharge Date and Time       Expected Discharge Date Expected Discharge Time    Jan 19, 2024               Isa Jorge RN

## 2024-01-20 ENCOUNTER — APPOINTMENT (OUTPATIENT)
Dept: GENERAL RADIOLOGY | Facility: HOSPITAL | Age: 89
End: 2024-01-20
Payer: MEDICARE

## 2024-01-20 ENCOUNTER — APPOINTMENT (OUTPATIENT)
Dept: CT IMAGING | Facility: HOSPITAL | Age: 89
End: 2024-01-20
Payer: MEDICARE

## 2024-01-20 ENCOUNTER — HOSPITAL ENCOUNTER (OUTPATIENT)
Facility: HOSPITAL | Age: 89
Setting detail: OBSERVATION
Discharge: HOME | End: 2024-01-21
Attending: EMERGENCY MEDICINE | Admitting: HOSPITALIST
Payer: MEDICARE

## 2024-01-20 DIAGNOSIS — G45.9 TIA (TRANSIENT ISCHEMIC ATTACK): Primary | ICD-10-CM

## 2024-01-20 DIAGNOSIS — R55 SYNCOPE AND COLLAPSE: ICD-10-CM

## 2024-01-20 DIAGNOSIS — I71.40 ABDOMINAL AORTIC ANEURYSM (AAA) WITHOUT RUPTURE, UNSPECIFIED PART: ICD-10-CM

## 2024-01-20 LAB
ABO GROUP BLD: NORMAL
ALBUMIN SERPL-MCNC: 2.8 G/DL (ref 3.5–5.2)
ALBUMIN/GLOB SERPL: 1 G/DL
ALP SERPL-CCNC: 57 U/L (ref 39–117)
ALT SERPL W P-5'-P-CCNC: 13 U/L (ref 1–33)
ANION GAP SERPL CALCULATED.3IONS-SCNC: 9 MMOL/L (ref 5–15)
APTT PPP: 30.7 SECONDS (ref 22.7–35.4)
AST SERPL-CCNC: 18 U/L (ref 1–32)
BASOPHILS # BLD AUTO: 0.04 10*3/MM3 (ref 0–0.2)
BASOPHILS NFR BLD AUTO: 0.5 % (ref 0–1.5)
BILIRUB SERPL-MCNC: 0.3 MG/DL (ref 0–1.2)
BLD GP AB SCN SERPL QL: NEGATIVE
BUN SERPL-MCNC: 31 MG/DL (ref 8–23)
BUN/CREAT SERPL: 20.4 (ref 7–25)
CALCIUM SPEC-SCNC: 9.5 MG/DL (ref 8.6–10.5)
CHLORIDE SERPL-SCNC: 104 MMOL/L (ref 98–107)
CO2 SERPL-SCNC: 26 MMOL/L (ref 22–29)
CREAT SERPL-MCNC: 1.52 MG/DL (ref 0.57–1)
DEPRECATED RDW RBC AUTO: 48.1 FL (ref 37–54)
EGFRCR SERPLBLD CKD-EPI 2021: 32.6 ML/MIN/1.73
EOSINOPHIL # BLD AUTO: 0.33 10*3/MM3 (ref 0–0.4)
EOSINOPHIL NFR BLD AUTO: 4.3 % (ref 0.3–6.2)
ERYTHROCYTE [DISTWIDTH] IN BLOOD BY AUTOMATED COUNT: 15.4 % (ref 12.3–15.4)
GEN 5 2HR TROPONIN T REFLEX: 48 NG/L
GLOBULIN UR ELPH-MCNC: 2.8 GM/DL
GLUCOSE SERPL-MCNC: 111 MG/DL (ref 65–99)
HCT VFR BLD AUTO: 26.4 % (ref 34–46.6)
HGB BLD-MCNC: 8.3 G/DL (ref 12–15.9)
HOLD SPECIMEN: NORMAL
HOLD SPECIMEN: NORMAL
IMM GRANULOCYTES # BLD AUTO: 0.03 10*3/MM3 (ref 0–0.05)
IMM GRANULOCYTES NFR BLD AUTO: 0.4 % (ref 0–0.5)
INR PPP: 1.16 (ref 0.9–1.1)
LYMPHOCYTES # BLD AUTO: 1.96 10*3/MM3 (ref 0.7–3.1)
LYMPHOCYTES NFR BLD AUTO: 25.7 % (ref 19.6–45.3)
MCH RBC QN AUTO: 27.1 PG (ref 26.6–33)
MCHC RBC AUTO-ENTMCNC: 31.4 G/DL (ref 31.5–35.7)
MCV RBC AUTO: 86.3 FL (ref 79–97)
MONOCYTES # BLD AUTO: 0.63 10*3/MM3 (ref 0.1–0.9)
MONOCYTES NFR BLD AUTO: 8.3 % (ref 5–12)
NEUTROPHILS NFR BLD AUTO: 4.63 10*3/MM3 (ref 1.7–7)
NEUTROPHILS NFR BLD AUTO: 60.8 % (ref 42.7–76)
NRBC BLD AUTO-RTO: 0 /100 WBC (ref 0–0.2)
PLATELET # BLD AUTO: 303 10*3/MM3 (ref 140–450)
PMV BLD AUTO: 9.8 FL (ref 6–12)
POTASSIUM SERPL-SCNC: 4.8 MMOL/L (ref 3.5–5.2)
PROT SERPL-MCNC: 5.6 G/DL (ref 6–8.5)
PROTHROMBIN TIME: 14.9 SECONDS (ref 11.7–14.2)
RBC # BLD AUTO: 3.06 10*6/MM3 (ref 3.77–5.28)
RH BLD: POSITIVE
SODIUM SERPL-SCNC: 139 MMOL/L (ref 136–145)
T&S EXPIRATION DATE: NORMAL
TROPONIN T DELTA: -11 NG/L
TROPONIN T SERPL HS-MCNC: 59 NG/L
WBC NRBC COR # BLD AUTO: 7.62 10*3/MM3 (ref 3.4–10.8)
WHOLE BLOOD HOLD COAG: NORMAL
WHOLE BLOOD HOLD SPECIMEN: NORMAL

## 2024-01-20 PROCEDURE — 70498 CT ANGIOGRAPHY NECK: CPT

## 2024-01-20 PROCEDURE — G0378 HOSPITAL OBSERVATION PER HR: HCPCS

## 2024-01-20 PROCEDURE — 86900 BLOOD TYPING SEROLOGIC ABO: CPT | Performed by: EMERGENCY MEDICINE

## 2024-01-20 PROCEDURE — 93005 ELECTROCARDIOGRAM TRACING: CPT | Performed by: EMERGENCY MEDICINE

## 2024-01-20 PROCEDURE — 85025 COMPLETE CBC W/AUTO DIFF WBC: CPT | Performed by: EMERGENCY MEDICINE

## 2024-01-20 PROCEDURE — 36415 COLL VENOUS BLD VENIPUNCTURE: CPT | Performed by: EMERGENCY MEDICINE

## 2024-01-20 PROCEDURE — 85610 PROTHROMBIN TIME: CPT | Performed by: EMERGENCY MEDICINE

## 2024-01-20 PROCEDURE — 85730 THROMBOPLASTIN TIME PARTIAL: CPT | Performed by: EMERGENCY MEDICINE

## 2024-01-20 PROCEDURE — 71045 X-RAY EXAM CHEST 1 VIEW: CPT

## 2024-01-20 PROCEDURE — 86850 RBC ANTIBODY SCREEN: CPT | Performed by: EMERGENCY MEDICINE

## 2024-01-20 PROCEDURE — 84484 ASSAY OF TROPONIN QUANT: CPT | Performed by: EMERGENCY MEDICINE

## 2024-01-20 PROCEDURE — 86901 BLOOD TYPING SEROLOGIC RH(D): CPT | Performed by: EMERGENCY MEDICINE

## 2024-01-20 PROCEDURE — 99291 CRITICAL CARE FIRST HOUR: CPT

## 2024-01-20 PROCEDURE — 70496 CT ANGIOGRAPHY HEAD: CPT

## 2024-01-20 PROCEDURE — 25510000001 IOPAMIDOL PER 1 ML: Performed by: EMERGENCY MEDICINE

## 2024-01-20 PROCEDURE — 0042T HC CT CEREBRAL PERFUSION W/WO CONTRAST: CPT

## 2024-01-20 PROCEDURE — 82565 ASSAY OF CREATININE: CPT

## 2024-01-20 PROCEDURE — 93010 ELECTROCARDIOGRAM REPORT: CPT | Performed by: INTERNAL MEDICINE

## 2024-01-20 PROCEDURE — 74177 CT ABD & PELVIS W/CONTRAST: CPT

## 2024-01-20 PROCEDURE — 25810000003 SODIUM CHLORIDE 0.9 % SOLUTION: Performed by: EMERGENCY MEDICINE

## 2024-01-20 PROCEDURE — 80053 COMPREHEN METABOLIC PANEL: CPT | Performed by: EMERGENCY MEDICINE

## 2024-01-20 RX ORDER — AMOXICILLIN 250 MG
2 CAPSULE ORAL 2 TIMES DAILY
Status: DISCONTINUED | OUTPATIENT
Start: 2024-01-20 | End: 2024-01-21 | Stop reason: HOSPADM

## 2024-01-20 RX ORDER — SODIUM CHLORIDE 0.9 % (FLUSH) 0.9 %
10 SYRINGE (ML) INJECTION EVERY 12 HOURS SCHEDULED
Status: DISCONTINUED | OUTPATIENT
Start: 2024-01-20 | End: 2024-01-21 | Stop reason: HOSPADM

## 2024-01-20 RX ORDER — LORAZEPAM 0.5 MG/1
0.5 TABLET ORAL EVERY 6 HOURS PRN
Status: DISCONTINUED | OUTPATIENT
Start: 2024-01-20 | End: 2024-01-21 | Stop reason: HOSPADM

## 2024-01-20 RX ORDER — ONDANSETRON 2 MG/ML
4 INJECTION INTRAMUSCULAR; INTRAVENOUS EVERY 6 HOURS PRN
Status: DISCONTINUED | OUTPATIENT
Start: 2024-01-20 | End: 2024-01-21 | Stop reason: HOSPADM

## 2024-01-20 RX ORDER — ATORVASTATIN CALCIUM 20 MG/1
40 TABLET, FILM COATED ORAL NIGHTLY
Status: DISCONTINUED | OUTPATIENT
Start: 2024-01-20 | End: 2024-01-21 | Stop reason: HOSPADM

## 2024-01-20 RX ORDER — ASCORBIC ACID 500 MG
500 TABLET ORAL DAILY
Status: DISCONTINUED | OUTPATIENT
Start: 2024-01-20 | End: 2024-01-21 | Stop reason: HOSPADM

## 2024-01-20 RX ORDER — CARVEDILOL 25 MG/1
25 TABLET ORAL 2 TIMES DAILY
Status: DISCONTINUED | OUTPATIENT
Start: 2024-01-20 | End: 2024-01-21 | Stop reason: HOSPADM

## 2024-01-20 RX ORDER — BISACODYL 5 MG/1
5 TABLET, DELAYED RELEASE ORAL DAILY PRN
Status: DISCONTINUED | OUTPATIENT
Start: 2024-01-20 | End: 2024-01-21 | Stop reason: HOSPADM

## 2024-01-20 RX ORDER — ASPIRIN 81 MG/1
81 TABLET, CHEWABLE ORAL DAILY
Status: DISCONTINUED | OUTPATIENT
Start: 2024-01-20 | End: 2024-01-21 | Stop reason: HOSPADM

## 2024-01-20 RX ORDER — BUSPIRONE HYDROCHLORIDE 10 MG/1
10 TABLET ORAL 2 TIMES DAILY
Status: DISCONTINUED | OUTPATIENT
Start: 2024-01-20 | End: 2024-01-21 | Stop reason: HOSPADM

## 2024-01-20 RX ORDER — ALLOPURINOL 300 MG/1
300 TABLET ORAL DAILY
Status: DISCONTINUED | OUTPATIENT
Start: 2024-01-20 | End: 2024-01-21 | Stop reason: HOSPADM

## 2024-01-20 RX ORDER — SPIRONOLACTONE 25 MG/1
25 TABLET ORAL DAILY
Status: DISCONTINUED | OUTPATIENT
Start: 2024-01-20 | End: 2024-01-21 | Stop reason: HOSPADM

## 2024-01-20 RX ORDER — VENLAFAXINE HYDROCHLORIDE 75 MG/1
75 CAPSULE, EXTENDED RELEASE ORAL DAILY
Status: DISCONTINUED | OUTPATIENT
Start: 2024-01-20 | End: 2024-01-21 | Stop reason: HOSPADM

## 2024-01-20 RX ORDER — MULTIPLE VITAMINS W/ MINERALS TAB 9MG-400MCG
1 TAB ORAL DAILY
Status: DISCONTINUED | OUTPATIENT
Start: 2024-01-20 | End: 2024-01-21 | Stop reason: HOSPADM

## 2024-01-20 RX ORDER — AMLODIPINE BESYLATE 2.5 MG/1
2.5 TABLET ORAL
Status: DISCONTINUED | OUTPATIENT
Start: 2024-01-20 | End: 2024-01-21 | Stop reason: HOSPADM

## 2024-01-20 RX ORDER — CLOPIDOGREL BISULFATE 75 MG/1
75 TABLET ORAL DAILY
Status: DISCONTINUED | OUTPATIENT
Start: 2024-01-20 | End: 2024-01-21 | Stop reason: HOSPADM

## 2024-01-20 RX ORDER — POLYETHYLENE GLYCOL 3350 17 G/17G
17 POWDER, FOR SOLUTION ORAL DAILY PRN
Status: DISCONTINUED | OUTPATIENT
Start: 2024-01-20 | End: 2024-01-21 | Stop reason: HOSPADM

## 2024-01-20 RX ORDER — BISACODYL 10 MG
10 SUPPOSITORY, RECTAL RECTAL DAILY PRN
Status: DISCONTINUED | OUTPATIENT
Start: 2024-01-20 | End: 2024-01-21 | Stop reason: HOSPADM

## 2024-01-20 RX ORDER — SODIUM CHLORIDE 0.9 % (FLUSH) 0.9 %
10 SYRINGE (ML) INJECTION AS NEEDED
Status: DISCONTINUED | OUTPATIENT
Start: 2024-01-20 | End: 2024-01-21 | Stop reason: HOSPADM

## 2024-01-20 RX ORDER — MELATONIN
5000 DAILY
Status: DISCONTINUED | OUTPATIENT
Start: 2024-01-20 | End: 2024-01-21 | Stop reason: HOSPADM

## 2024-01-20 RX ORDER — SODIUM CHLORIDE 9 MG/ML
40 INJECTION, SOLUTION INTRAVENOUS AS NEEDED
Status: DISCONTINUED | OUTPATIENT
Start: 2024-01-20 | End: 2024-01-21 | Stop reason: HOSPADM

## 2024-01-20 RX ADMIN — CARVEDILOL 25 MG: 25 TABLET, FILM COATED ORAL at 21:05

## 2024-01-20 RX ADMIN — DOCUSATE SODIUM 50MG AND SENNOSIDES 8.6MG 2 TABLET: 8.6; 5 TABLET, FILM COATED ORAL at 21:06

## 2024-01-20 RX ADMIN — ASPIRIN 81 MG: 81 TABLET, CHEWABLE ORAL at 18:27

## 2024-01-20 RX ADMIN — BUSPIRONE HYDROCHLORIDE 10 MG: 10 TABLET ORAL at 21:05

## 2024-01-20 RX ADMIN — Medication 10 ML: at 21:06

## 2024-01-20 RX ADMIN — Medication 5000 UNITS: at 18:16

## 2024-01-20 RX ADMIN — IOPAMIDOL 150 ML: 755 INJECTION, SOLUTION INTRAVENOUS at 14:54

## 2024-01-20 RX ADMIN — SODIUM CHLORIDE 1000 ML: 9 INJECTION, SOLUTION INTRAVENOUS at 16:00

## 2024-01-20 RX ADMIN — Medication 1 TABLET: at 18:27

## 2024-01-20 RX ADMIN — OXYCODONE HYDROCHLORIDE AND ACETAMINOPHEN 500 MG: 500 TABLET ORAL at 21:05

## 2024-01-20 NOTE — ED PROVIDER NOTES
EMERGENCY DEPARTMENT ENCOUNTER    Room Number:  34/34  PCP: Tonny Willis MD  Historian: EMS      HPI:  Chief Complaint: Syncope right-sided weakness  A complete HPI/ROS/PMH/PSH/SH/FH are unobtainable due to: None  Context: Antonette King is a 89 y.o. female who presents to the ED c/o syncope and right-sided weakness.  Patient was just admitted to the hospital for TIA and has a known AAA.  Today patient apparently had a syncopal episode.  Patient was being bagged by EMS.  Patient then came around.  Was hypotensive.  Patient now has dense right hemiparesis.  Patient has no fevers or chills.            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 04/16/2021    Hyperlipidemia 04/16/2021    Thoracic aortic ectasia 09/06/2022    Chest pain 07/31/2023    Acute renal failure superimposed on stage 3a chronic kidney disease 08/01/2023    Hypertensive urgency 08/02/2023    Asymptomatic hypertensive urgency 08/02/2023    CAD (coronary artery disease) 08/10/2023    S/P CABG (coronary artery bypass graft) 10/12/2022    TIA (transient ischemic attack) 01/14/2024    Cerebral aneurysm, nonruptured 01/16/2024    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 01/16/2024     Resolved Ambulatory Problems     Diagnosis Date Noted    Acute UTI 04/12/2021     Past Medical History:   Diagnosis Date    Aortic ectasia, thoracic     Breast cancer 2001    History of blood clots     Hx of radiation therapy          PAST SURGICAL HISTORY  Past Surgical History:   Procedure Laterality Date    BREAST BIOPSY      BREAST LUMPECTOMY Left 2011    CARDIAC SURGERY      CARPAL TUNNEL RELEASE      HYSTERECTOMY      TONSILLECTOMY           FAMILY HISTORY  Family History   Problem Relation Age of Onset    Stroke Mother     Stroke Brother     Breast cancer Neg Hx          SOCIAL HISTORY  Social  History     Socioeconomic History    Marital status:     Number of children: 4   Tobacco Use    Smoking status: Former     Types: Cigarettes    Smokeless tobacco: Never   Vaping Use    Vaping Use: Never used   Substance and Sexual Activity    Alcohol use: No     Comment: caffeine use    Drug use: No    Sexual activity: Defer         ALLERGIES  Prednisone        REVIEW OF SYSTEMS  Review of Systems   Syncope and weakness      PHYSICAL EXAM  ED Triage Vitals   Temp Heart Rate Resp BP SpO2   01/20/24 1428 01/20/24 1420 01/20/24 1420 01/20/24 1420 01/20/24 1420   97.5 °F (36.4 °C) 57 18 (!) 72/30 97 %      Temp src Heart Rate Source Patient Position BP Location FiO2 (%)   01/20/24 1428 -- -- -- --   Tympanic           Physical Exam      GENERAL: Patient is awake with left gaze deviation  HENT: nares patent  EYES: no scleral icterus  CV: regular rhythm, normal rate  RESPIRATORY: normal effort  ABDOMEN: soft  MUSCULOSKELETAL: no deformity  NEURO: alert, moves all extremities, follows commands.  Unable to move right arm right leg right facial droop  PSYCH:  calm, cooperative  SKIN: warm, dry    Vital signs and nursing notes reviewed.      NIH 13    LAB RESULTS  Recent Results (from the past 24 hour(s))   Comprehensive Metabolic Panel    Collection Time: 01/20/24  2:26 PM    Specimen: Blood   Result Value Ref Range    Glucose 111 (H) 65 - 99 mg/dL    BUN 31 (H) 8 - 23 mg/dL    Creatinine 1.52 (H) 0.57 - 1.00 mg/dL    Sodium 139 136 - 145 mmol/L    Potassium 4.8 3.5 - 5.2 mmol/L    Chloride 104 98 - 107 mmol/L    CO2 26.0 22.0 - 29.0 mmol/L    Calcium 9.5 8.6 - 10.5 mg/dL    Total Protein 5.6 (L) 6.0 - 8.5 g/dL    Albumin 2.8 (L) 3.5 - 5.2 g/dL    ALT (SGPT) 13 1 - 33 U/L    AST (SGOT) 18 1 - 32 U/L    Alkaline Phosphatase 57 39 - 117 U/L    Total Bilirubin 0.3 0.0 - 1.2 mg/dL    Globulin 2.8 gm/dL    A/G Ratio 1.0 g/dL    BUN/Creatinine Ratio 20.4 7.0 - 25.0    Anion Gap 9.0 5.0 - 15.0 mmol/L    eGFR 32.6 (L) >60.0  mL/min/1.73   Protime-INR    Collection Time: 01/20/24  2:26 PM    Specimen: Blood   Result Value Ref Range    Protime 14.9 (H) 11.7 - 14.2 Seconds    INR 1.16 (H) 0.90 - 1.10   aPTT    Collection Time: 01/20/24  2:26 PM    Specimen: Blood   Result Value Ref Range    PTT 30.7 22.7 - 35.4 seconds   Single High Sensitivity Troponin T    Collection Time: 01/20/24  2:26 PM    Specimen: Blood   Result Value Ref Range    HS Troponin T 59 (C) <14 ng/L   Type & Screen    Collection Time: 01/20/24  2:26 PM    Specimen: Blood   Result Value Ref Range    ABO Type O     RH type Positive     Antibody Screen Negative     T&S Expiration Date 1/23/2024 11:59:59 PM    Green Top (Gel)    Collection Time: 01/20/24  2:26 PM   Result Value Ref Range    Extra Tube Hold for add-ons.    Lavender Top    Collection Time: 01/20/24  2:26 PM   Result Value Ref Range    Extra Tube hold for add-on    Gold Top - SST    Collection Time: 01/20/24  2:26 PM   Result Value Ref Range    Extra Tube Hold for add-ons.    Light Blue Top    Collection Time: 01/20/24  2:26 PM   Result Value Ref Range    Extra Tube Hold for add-ons.    CBC Auto Differential    Collection Time: 01/20/24  2:26 PM    Specimen: Blood   Result Value Ref Range    WBC 7.62 3.40 - 10.80 10*3/mm3    RBC 3.06 (L) 3.77 - 5.28 10*6/mm3    Hemoglobin 8.3 (L) 12.0 - 15.9 g/dL    Hematocrit 26.4 (L) 34.0 - 46.6 %    MCV 86.3 79.0 - 97.0 fL    MCH 27.1 26.6 - 33.0 pg    MCHC 31.4 (L) 31.5 - 35.7 g/dL    RDW 15.4 12.3 - 15.4 %    RDW-SD 48.1 37.0 - 54.0 fl    MPV 9.8 6.0 - 12.0 fL    Platelets 303 140 - 450 10*3/mm3    Neutrophil % 60.8 42.7 - 76.0 %    Lymphocyte % 25.7 19.6 - 45.3 %    Monocyte % 8.3 5.0 - 12.0 %    Eosinophil % 4.3 0.3 - 6.2 %    Basophil % 0.5 0.0 - 1.5 %    Immature Grans % 0.4 0.0 - 0.5 %    Neutrophils, Absolute 4.63 1.70 - 7.00 10*3/mm3    Lymphocytes, Absolute 1.96 0.70 - 3.10 10*3/mm3    Monocytes, Absolute 0.63 0.10 - 0.90 10*3/mm3    Eosinophils, Absolute 0.33 0.00  - 0.40 10*3/mm3    Basophils, Absolute 0.04 0.00 - 0.20 10*3/mm3    Immature Grans, Absolute 0.03 0.00 - 0.05 10*3/mm3    nRBC 0.0 0.0 - 0.2 /100 WBC   ECG 12 Lead Stroke Evaluation    Collection Time: 01/20/24  2:58 PM   Result Value Ref Range    QT Interval 470 ms    QTC Interval 482 ms       Ordered the above labs and reviewed the results.        RADIOLOGY  CT Abdomen Pelvis With Contrast    Result Date: 1/20/2024  CT ABDOMEN AND PELVIS WITH CONTRAST  HISTORY: Abdominal pain, known abdominal aortic aneurysm.  TECHNIQUE: Abdomen and pelvis CT with IV contrast 150 mL Isovue-370 contrast is provided. Correlation with noncontrast CT from 1:30 p.m. on 01/17/2024. This is not an arteriogram protocol.  FINDINGS: Extensive noncalcified atheromatous plaque along the aneurysmally enlarged distal thoracic aorta is again observed. The aorta measures about 50 mm transverse diameter as measured on coronal reformatted images. There are multiple areas of ulceration in the noncalcified portions of the atheroma. No dissection flap. Short segment high-grade stenosis at the celiac origin. Proximal SMA is patent. Main renal arteries appear grossly normal.  About 8 mm beyond the left renal artery origin, there is a saccular infrarenal abdominal aortic aneurysm which measures about 10 cm long. Its greatest transverse dimension measured on coronal reformatted images is 11 cm, similar as on the recent prior noncontrast exam. On axial image 60 the lesion measures 10.6 x 9.6 cm. There is some mild haziness in the fat around the periphery of the lesion suggesting there may be pending rupture. Nonenhancing thrombus fills a substantial portion of the aneurysm sac but there is enhancement in the proximal portion of the aneurysm to about 6 cm axial diameter. The lesion tapers and there is about 18 mm of normal caliber distal abdominal aorta above the level of the aortic bifurcation. The right common iliac artery is tortuous. The common,  external iliac, and common femoral arteries are patent.  Clear lung bases. Multiple renal cysts. Normal enhancement of the renal parenchyma. Parenchyma of the pancreas, adrenals, spleen, and liver appears normal. The bowel and the urinary bladder appear normal.      11 cm axial diameter, 10 cm long saccular infrarenal abdominal aortic aneurysm with mild haziness of the adjacent fat suggestive of pending rupture. The distal thoracic aorta is aneurysmally enlarged to 5 cm and there is extensive calcified and noncalcified atheromatous plaque in the distal thoracic aorta with multiple areas of ulceration in the noncalcified portions of plaque.  I discussed the case with Dr. Mcintosh at around 3:20 p.m.  Radiation dose reduction techniques were utilized, including automated exposure control and exposure modulation based on body size.       CT Angiogram Head w AI Analysis of LVO, CT Angiogram Neck, CT CEREBRAL PERFUSION WITH & WITHOUT CONTRAST    Result Date: 1/20/2024  CT ANGIOGRAM HEAD AND NECK WITH IV CONTRAST, CT CEREBRAL PERFUSION WITH AND WITHOUT CONTRAST  HISTORY: Right-sided facial droop and right-sided arm and leg weakness. Slurred speech.  TECHNIQUE: CT head was performed without IV contrast followed by IV contrast administration and CT head and neck with IV contrast. Data reconstructed in coronal and sagittal planes and 3D volume rendering was performed. CT cerebral perfusion was performed with and without IV contrast and evaluated with rapid software.  COMPARISON: MR angiogram of the head and neck 01/15/2024, MRI brain 01/14/2024, CT head 01/14/2024, CT head without contrast 04/12/2021.  FINDINGS: There is moderate chronic small ischemic white matter change. 15 mm chronic lacunar infarction is present within the head of the right caudate nucleus extending to the anterior limb of the right internal capsule. There is also a 2.5 cm chronic left superior parietal infarction within the left MCA distribution. Mild  diffuse cerebral atrophy is present. There are no abnormal areas of increased attenuation intraaxially to suggest hemorrhage. There is no evidence for mass effect or shift of the midline structures.  CT cerebral perfusion exam demonstrates 0 mL where there is cerebral blood flow less than 30% and 0 mL where there is Tmax greater than 6 seconds.  Atherosclerotic calcifications are present involving the thoracic aorta where there is calcified and noncalcified plaque with mural thrombus. Great vessel origins are patent. There is calcified plaque involving the proximal 16 mm of the left internal carotid artery with NASCET stenosis measuring approximately 25%. Calcified and noncalcified plaque is present involving the right carotid bulb and proximal 1.7 cm of the right ICA where there is NASCET stenosis measuring approximately 15-20%. There is a giant cavernous segment left ICA aneurysm measuring approximately 17 x 15 x 14 mm and this contains flow. Calcified plaques are present involving the cavernous and supracavernous segments of both ICAs with moderate narrowing. There is moderate narrowing of the distal M1 segment of the right MCA. Both MCAs and ACAs are patent.  The right vertebral artery is larger than the left and both vertebral arteries are tortuous and patent. There is calcified plaque involving the intracranial segment of the right vertebral artery with moderate narrowing at the origin of the PICA. There is up to severe narrowing of the post PICA segment in the distal right intracranial segment of the right vertebral artery exhibits undulating configuration with mild areas of aneurysmal dilatation. 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 The post PICA segment of the left vertebral artery is markedly hypoplastic and does not exhibit flow. There is a fetal origin of the right PCA. Both PCAs are patent.      1. Chronic bilateral cerebral infarctions appear  similar to previous head CT. No evidence for intracranial hemorrhage or acute intracranial abnormality. Further evaluation could be performed with brain MRI if indicated. 2. Atherosclerotic disease with calcified plaques involving both carotid bulbs and proximal ICAs with 25% stenosis on the right and 15% to 20% stenosis on the left involving the ICA origins. 3. 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 4. Atherosclerotic disease involving the cavernous and supracavernous segments of both MCAs with moderate narrowing. Mild narrowing distal M1 segment right MCA. 5. Right vertebral artery is larger than the left and both vertebral arteries are tortuous. There is calcified plaque involving the intracranial segments of both vertebral arteries with up to severe stenosis of the post-PICA segment right vertebral artery which exhibits undulating configuration with mild aneurysmal dilatation. 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 Fetal origin right PCA.   Findings of the noncontrasted exam discussed with Dr. Bustos at 2:44 p.m. Findings of the contrasted exam discussed with Dr. Bustos at 3:15 p.m. AI analysis of LVO was utilized.  Radiation dose reduction techniques were utilized, including automated exposure control and exposure modulation based on body size.   This report was finalized on 1/20/2024 3:56 PM by Dr. Curly Riojas M.D on Workstation: [employer] Chest 1 View    Result Date: 1/20/2024  Portable chest x-ray  HISTORY: Stroke.  TECHNIQUE: Oblique frontal portable chest x-ray correlated with chest x-ray July 31, 2023.  FINDINGS: Sternal wires with prominent cardiac silhouette and tortuous  descending aorta are unchanged. Vascular volume is normal. Lungs are clear and costophrenic sulci are dry. Clips from left axillary dissection.      Stable chronic change in the chest. No acute abnormality identified.  This report was finalized on 1/20/2024 3:38 PM by Dr. Alvarez Sewell M.D on Workstation: [employer]       Ordered the above noted radiological studies.  Chest x-ray independently interpreted by me and shows no evidence of pneumonia          PROCEDURES  Critical Care    Performed by: Jean-iPerre Chi MD  Authorized by: Jean-Pierre Chi MD    Critical care provider statement:     Critical care time (minutes):  45    Critical care time was exclusive of:  Separately billable procedures and treating other patients    Critical care was necessary to treat or prevent imminent or life-threatening deterioration of the following conditions:  CNS failure or compromise and shock    Critical care was time spent personally by me on the following activities:  Ordering and performing treatments and interventions, ordering and review of laboratory studies, discussions with consultants and discussions with primary provider    EKG          EKG time: 1458  Rhythm/Rate: Normal sinus rhythm 63  P waves and WI: Normal P waves  QRS, axis: Normal QRS  ST and T waves: Normal ST-T wave    Interpreted Contemporaneously by me, independently viewed  Unchanged compared to prior 1/14/2024          MEDICATIONS GIVEN IN ER  Medications   sodium chloride 0.9 % flush 10 mL (has no administration in time range)   iopamidol (ISOVUE-370) 76 % injection 150 mL (150 mL Intravenous Given by Other 1/20/24 1454)                   MEDICAL DECISION MAKING, PROGRESS, and CONSULTS    All labs have been independently reviewed by me.  All radiology studies have been reviewed by me and I have also reviewed the radiology report.   EKG's independently viewed and interpreted by me.  Discussion below represents my analysis of pertinent findings related  to patient's condition, differential diagnosis, treatment plan and final disposition.      Additional sources:  - Discussed/ obtained information from independent historians: History obtained from family who do states she is DNR/DNI    - External (non-ED) record review: Epic reviewed and patient was just admitted to the hospital discharged a few days ago with similar symptoms    - Chronic or social conditions impacting care: None    - Shared decision making: None      Orders placed during this visit:  Orders Placed This Encounter   Procedures    CT Angiogram Head w AI Analysis of LVO    CT Angiogram Neck    CT CEREBRAL PERFUSION WITH & WITHOUT CONTRAST    XR Chest 1 View    CT Abdomen Pelvis With Contrast    [place] Draw    Comprehensive Metabolic Panel    Protime-INR    aPTT    Single High Sensitivity Troponin T    CBC Auto Differential    High Sensitivity Troponin T 2Hr    NPO Diet NPO Type: Strict NPO    Initiate Department's Acute Stroke Process (Team D, Code 19, etc.)    Perform NIH Stroke Scale    Measure Actual Weight    Notify MD for SBP < 80 or > 200    Notify Provider for SBP greater than 140 if hemorrhagic stroke    Head of Bed 30 Degrees or Less    Undress and Gown    Continuous Pulse Oximetry    Vital Signs    Neuro Checks    No Hypotonic Fluids    Nursing Dysphagia Screening (Complete Prior to Giving anything PO)    RN to Place Order SLP Consult (IF swallow screen failed) - Eval & Treat Choosing Reason of RN Dysphagia Screen Failed    Inpatient Neurology Consult Stroke    Inpatient Neurology Consult Stroke    LHA (on-call MD unless specified) Details    Oxygen Therapy- Nasal Cannula; Titrate 1-6 LPM Per SpO2; 90 - 95%    POC Glucose Once    ECG 12 Lead Stroke Evaluation    Type & Screen    Insert Large-Bore Peripheral IV - RIGHT AC Preferred    Initiate Observation Status    CBC & Differential    Green Top (Gel)    Lavender Top    Gold Top - SST    Light Blue Top         Additional orders considered  but not ordered:  None        Differential diagnosis includes but is not limited to:    CVA versus TIA versus vasovagal syncope versus AAA rupture      Independent interpretation of labs, radiology studies, and discussions with consultants:  ED Course as of 01/20/24 1620   Sat Jan 20, 2024   1558 15:58 EST  This is a very bizarre case.  Patient presents after syncopal episode.  Was being bagged by EMS.  Patient then presents with what appears to be large vessel occlusion with gaze deviation and no movement on the right side.  Patient also has hypotension.  Patient has known large AAA with pending rupture.  Patient discussed with Dr. Garcia.  Had CT scan that shows no stroke.  Patient obviously not a tPA or intervention candidate.  Patient is to be palliative care.  Patient's CT abdomen does not showed rupture as well.  Patient now has normal blood pressure is awake alert talking and has normal strength on the right side.  Discussed with family they want her to be DNR/DNI they want to keep comfortable.  They have not arranged hospice yet.  Patient will be admitted here.  Discussed with Dr. Sands who will admit. [SL]      ED Course User Index  [SL] Jean-Pierre Chi MD                 DIAGNOSIS  Final diagnoses:   TIA (transient ischemic attack)   Syncope and collapse   Abdominal aortic aneurysm (AAA) without rupture, unspecified part         DISPOSITION  admit            Latest Documented Vital Signs:  As of 16:20 EST  BP- 112/71 HR- 71 Temp- 97.5 °F (36.4 °C) (Tympanic) O2 sat- 95%              --    Please note that portions of this were completed with a voice recognition program.       Note Disclaimer: At [facility], we believe that sharing information builds trust and better relationships. You are receiving this note because you are receiving care at [facility] or recently visited. It is possible you will see health information before a provider has talked with you about it. This kind of information  can be easy to misunderstand. To help you fully understand what it means for your health, we urge you to discuss this note with your provider.            Jean-Pierre Chi MD  01/20/24 3990

## 2024-01-20 NOTE — SIGNIFICANT NOTE
"   01/20/24 1440   Peripheral IV 01/20/24 1439 Anterior;Right;Upper Arm   Placement date: If unknown, DO NOT use \"Add Comment\" note/Placement time: If unknown, DO NOT use \"Add Comment\" note: 01/20/24 1439   Hand Hygiene Completed: Yes  Size (Gauge): 18 G  Orientation: Anterior;Right;Upper  Location: Arm  Site Prep: Chlorhex...   Site Assessment Clean;Dry;Intact   Dressing Type Transparent   Line Status Blood return noted;Flushed;Saline locked   Dressing Status Clean;Dry;Intact   Dressing Intervention New dressing   Phlebitis 0-->no symptoms     Ultrasound Inserted IV Site:ALEXANDER    Catheter Length: 1.88 in    Diameter: 0.50 cm    Depth: 1.0 cm      Vascular Access Score=5  1) Palpable / Visible / Dis  2) Palpable / Viasible / Not Distended  3) Easily Palpable / Not Visibile  4) Poorly Palpable / Visible  5) Poorly / Nonpalpable / NV   "

## 2024-01-20 NOTE — ED NOTES
"Nursing report ED to floor  Antonette King  89 y.o.  female    HPI :   Chief Complaint   Patient presents with    Neuro Deficit(s)       Admitting doctor:   Giles Sands MD    Admitting diagnosis:   The primary encounter diagnosis was TIA (transient ischemic attack). Diagnoses of Syncope and collapse and Abdominal aortic aneurysm (AAA) without rupture, unspecified part were also pertinent to this visit.    Code status:   Current Code Status       Date Active Code Status Order ID Comments User Context       Prior            Allergies:   Prednisone    Isolation:   No active isolations    Intake and Output  No intake or output data in the 24 hours ending 01/20/24 1610    Weight:       01/20/24  1420   Weight: 75.7 kg (166 lb 14.2 oz)       Most recent vitals:   Vitals:    01/20/24 1453 01/20/24 1500 01/20/24 1501 01/20/24 1531   BP:   133/75 112/71   Pulse: 62  63 71   Resp:       Temp:       TempSrc:       SpO2: 94%  94% 95%   Weight:       Height:  162.6 cm (64\")         Active LDAs/IV Access:   Lines, Drains & Airways       Active LDAs       Name Placement date Placement time Site Days    Peripheral IV 01/20/24 1417 Anterior;Distal;Left Forearm 01/20/24  1417  Forearm  less than 1    Peripheral IV 01/20/24 1438 Left Antecubital 01/20/24  1438  Antecubital  less than 1    Peripheral IV 01/20/24 1439 Anterior;Right;Upper Arm 01/20/24  1439  Arm  less than 1    External Urinary Catheter 01/20/24  1533  --  less than 1                    Labs (abnormal labs have a star):   Labs Reviewed   COMPREHENSIVE METABOLIC PANEL - Abnormal; Notable for the following components:       Result Value    Glucose 111 (*)     BUN 31 (*)     Creatinine 1.52 (*)     Total Protein 5.6 (*)     Albumin 2.8 (*)     eGFR 32.6 (*)     All other components within normal limits    Narrative:     GFR Normal >60  Chronic Kidney Disease <60  Kidney Failure <15    The GFR formula is only valid for adults with stable renal function between ages 18 and " 70.   PROTIME-INR - Abnormal; Notable for the following components:    Protime 14.9 (*)     INR 1.16 (*)     All other components within normal limits   SINGLE HSTROPONIN T - Abnormal; Notable for the following components:    HS Troponin T 59 (*)     All other components within normal limits    Narrative:     High Sensitive Troponin T Reference Range:  <14.0 ng/L- Negative Female for AMI  <22.0 ng/L- Negative Male for AMI  >=14 - Abnormal Female indicating possible myocardial injury.  >=22 - Abnormal Male indicating possible myocardial injury.   Clinicians would have to utilize clinical acumen, EKG, Troponin, and serial changes to determine if it is an Acute Myocardial Infarction or myocardial injury due to an underlying chronic condition.        CBC WITH AUTO DIFFERENTIAL - Abnormal; Notable for the following components:    RBC 3.06 (*)     Hemoglobin 8.3 (*)     Hematocrit 26.4 (*)     MCHC 31.4 (*)     All other components within normal limits   APTT - Normal   RAINBOW DRAW    Narrative:     The following orders were created for panel order [place] Draw.  Procedure                               Abnormality         Status                     ---------                               -----------         ------                     Green Top (Gel)[[id number]]                                  Final result               Lavender Top[[id number]]                                     Final result               Gold Top - SST[[id number]]                                   Final result               Light Blue Top[[id number]]                                   Final result                 Please view results for these tests on the individual orders.   HIGH SENSITIVITIY TROPONIN T 2HR   POCT GLUCOSE FINGERSTICK   TYPE AND SCREEN   CBC AND DIFFERENTIAL    Narrative:     The following orders were created for panel order CBC & Differential.  Procedure                               Abnormality         Status                     ---------                                -----------         ------                     CBC Auto Differential[[id number]]        Abnormal            Final result                 Please view results for these tests on the individual orders.   GREEN TOP   LAVENDER TOP   GOLD TOP - SST   LIGHT BLUE TOP       EKG:   ECG 12 Lead Stroke Evaluation   Preliminary Result   HEART RATE= 63  bpm   RR Interval= 952  ms   KS Interval= 192  ms   P Horizontal Axis= 14  deg   P Front Axis= 74  deg   QRSD Interval= 110  ms   QT Interval= 470  ms   QTcB= 482  ms   QRS Axis= 70  deg   T Wave Axis= 56  deg   - NORMAL ECG -   Sinus rhythm   Electronically Signed By:    Date and Time of Study: 2024-01-20 14:58:51          Meds given in ED:   Medications   sodium chloride 0.9 % flush 10 mL (has no administration in time range)   iopamidol (ISOVUE-370) 76 % injection 150 mL (150 mL Intravenous Given by Other 1/20/24 6769)       Imaging results:  CT Angiogram Head w AI Analysis of LVO    Result Date: 1/20/2024  1. Chronic bilateral cerebral infarctions appear similar to previous head CT. No evidence for intracranial hemorrhage or acute intracranial abnormality. Further evaluation could be performed with brain MRI if indicated. 2. Atherosclerotic disease with calcified plaques involving both carotid bulbs and proximal ICAs with 25% stenosis on the right and 15% to 20% stenosis on the left involving the ICA origins. 3. 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 4. Atherosclerotic disease involving the cavernous and supracavernous segments of both MCAs with moderate narrowing. Mild narrowing distal M1 segment right MCA. 5. Right vertebral artery is larger than the left and both vertebral arteries are tortuous. There is calcified plaque involving the intracranial segments of both vertebral arteries with up to severe stenosis of the post-PICA segment right vertebral artery which  exhibits undulating configuration with mild aneurysmal dilatation. 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 Fetal origin right PCA.   Findings of the noncontrasted exam discussed with Dr. Bustos at 2:44 p.m. Findings of the contrasted exam discussed with Dr. Bustos at 3:15 p.m. AI analysis of LVO was utilized.  Radiation dose reduction techniques were utilized, including automated exposure control and exposure modulation based on body size.   This report was finalized on 1/20/2024 3:56 PM by Dr. Curly Riojas M.D on Workstation: [id number]      CT Angiogram Neck    Result Date: 1/20/2024  1. Chronic bilateral cerebral infarctions appear similar to previous head CT. No evidence for intracranial hemorrhage or acute intracranial abnormality. Further evaluation could be performed with brain MRI if indicated. 2. Atherosclerotic disease with calcified plaques involving both carotid bulbs and proximal ICAs with 25% stenosis on the right and 15% to 20% stenosis on the left involving the ICA origins. 3. 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 4. Atherosclerotic disease involving the cavernous and supracavernous segments of both MCAs with moderate narrowing. Mild narrowing distal M1 segment right MCA. 5. Right vertebral artery is larger than the left and both vertebral arteries are tortuous. There is calcified plaque involving the intracranial segments of both vertebral arteries with up to severe stenosis of the post-PICA segment right vertebral artery which exhibits undulating configuration with mild aneurysmal dilatation. 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 Fetal origin right PCA.   Findings of the noncontrasted exam discussed with Dr. Bustos at 2:44 p.m. Findings of the contrasted exam discussed with Dr. Bustos at 3:15 p.m. AI analysis of LVO was utilized.  Radiation dose reduction techniques were utilized, including automated exposure control and exposure modulation based on body size.   This report was finalized on 1/20/2024 3:56 PM by Dr. Curly Riojas M.D on Workstation: [id number]      CT CEREBRAL PERFUSION WITH & WITHOUT CONTRAST    Result Date: 1/20/2024  1. Chronic bilateral cerebral infarctions appear similar to previous head CT. No evidence for intracranial hemorrhage or acute intracranial abnormality. Further evaluation could be performed with brain MRI if indicated. 2. Atherosclerotic disease with calcified plaques involving both carotid bulbs and proximal ICAs with 25% stenosis on the right and 15% to 20% stenosis on the left involving the ICA origins. 3. 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 4. Atherosclerotic disease involving the cavernous and supracavernous segments of both MCAs with moderate narrowing. Mild narrowing distal M1 segment right MCA. 5. Right vertebral artery is larger than the left and both vertebral arteries are tortuous. There is calcified plaque involving the intracranial segments of both vertebral arteries with up to severe stenosis of the post-PICA segment right vertebral artery which exhibits undulating configuration with mild aneurysmal dilatation. 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 Fetal origin right PCA.   Findings of the noncontrasted exam discussed with Dr. Bustos at 2:44 p.m. Findings of the contrasted exam discussed with Dr. Bustos at 3:15 p.m. AI analysis of LVO was utilized.  Radiation dose reduction techniques were utilized, including automated exposure control and exposure modulation based on body size.   This report was finalized on 1/20/2024 3:56 PM by Dr. Curly Riojas M.D on Workstation: [id number]      XR Chest 1 View    Result Date: 1/20/2024  Stable chronic change in the chest. No acute abnormality identified.  This report was finalized on 1/20/2024 3:38 PM by Dr. Alvarez Sewell M.D on Workstation: [id number]       Ambulatory status:   - bedrest    Social issues:   Social History     Socioeconomic History    Marital status:     Number of children: 4   Tobacco Use    Smoking status: Former     Types: Cigarettes    Smokeless tobacco: Never   Vaping Use    Vaping Use: Never used   Substance and Sexual Activity    Alcohol use: No     Comment: caffeine use    Drug use: No    Sexual activity: Defer       NIH Stroke Scale:  Interval: baseline    Cat Bean RN  01/20/24 16:10 EST

## 2024-01-20 NOTE — H&P
Internal medicine history and physical  INTERNAL MEDICINE   [facility]       Patient Identification:  Name: Antonette King  Age: 89 y.o.  Sex: female  :  1934  MRN: [id number]                   Primary Care Physician: Tonny Willis MD                               Date of admission:2024    Chief Complaint: Presented to the emergency room after passing out and right-sided weakness earlier today after taking her blood pressure medicine.    History of Present Illness:   Patient is an-year-old female who was localized 5 days 2024 to 2024 when she presented with sudden onset of right-sided weakness and confusion and spell.  Evaluation intracranial aneurysm as well as abdominal aortic aneurysm.  Patient was evaluated by surgery and vascular surgery service and because of her age and risk for despite that her abdominal aneurysm is at risk of spontaneous rupture she was not considered to be a good surgical candidate and palliative care recommended.  Patient was discharged with understanding that family member would arrange hospice care for him.  According to son at the bedside the loss of her to [place] to live with her other son at the big house [place].  According to the son after she left hospital yesterday she was found an uneventful night and with medications that are supposed to give to her.  This morning she got her morning medications after receiving her morning amlodipine she developed sudden right-sided weakness and passed out.  This resulted EMS been called and patient was found to be unresponsive requiring her to be bagged by EMS and awoke and turned around but was noted to be hypotensive with dense right-sided hemiparesis up arrival to the emergency room.  After her blood pressure was improved in the emergency room her left-sided weakness and mental status significantly improved.  Patient and family member understand that she is not a candidate for intervention  but was admitted for further care including goals of care to be rediscussed in care structure to be aligned to the patient's underlying condition expectations.  I have a conversation with patient at the bedside it appears that he wants things done for her but at the same time does not want any aggressive measures.  He admits he is not sure what he wants to do.  Patient did have extensive evaluation in the emergency room including CT scan of the abdomen/pelvis which showed 11 cm x 10 cm saccular aneurysm mild haziness of adjacent fat suggestive of impending rupture.  She was also have distal thoracic aortic aneurysm with calcified plaques and ulcerated plaques.  Patient had CT angiogram of the head and neck which revealed chronic bilateral cerebral infarctions no acute intracranial abnormalities and then revealing findings noted in the hospitalization consisting of atherosclerosis of the renal arteries and aneurysms.  Left ICA cavernous segment aneurysm measuring 1.7 x 1.5 x 1.4 cm.  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 04/16/2021    History of blood clots     Hx of radiation therapy     2001, left breast ca    Hyperlipidemia 04/16/2021    Hypertension 04/16/2021    S/P CABG (coronary artery bypass graft) 10/12/2022     Past Surgical History:  Past Surgical History:   Procedure Laterality Date    BREAST BIOPSY      BREAST LUMPECTOMY Left 2011    CARDIAC SURGERY      CARPAL TUNNEL RELEASE      HYSTERECTOMY      TONSILLECTOMY        Home Meds:  (Not in a hospital admission)    Current Meds:     Current Facility-Administered Medications:     sodium chloride 0.9 % flush 10 mL, 10 mL, Intravenous, PRN, Jean-Pierre Chi MD    Current Outpatient Medications:     allopurinol (ZYLOPRIM) 300 MG tablet, Take 1 tablet  "by mouth Daily., Disp: , Rfl:     amLODIPine (NORVASC) 2.5 MG tablet, Take 1 tablet by mouth Every 12 (Twelve) Hours for 30 days., Disp: 60 tablet, Rfl: 0    ascorbic acid (VITAMIN C) 500 MG tablet, Take 1 tablet by mouth Daily., Disp: , Rfl:     aspirin 81 MG chewable tablet, Chew 1 tablet Daily., Disp: , Rfl:     atorvastatin (LIPITOR) 40 MG tablet, Take 1 tablet by mouth Every Night., Disp: 30 tablet, Rfl: 0    busPIRone (BUSPAR) 10 MG tablet, Take 1 tablet by mouth 2 (Two) Times a Day., Disp: , Rfl:     carvedilol (COREG) 25 MG tablet, Take 1 tablet by mouth 2 (Two) Times a Day., Disp: 60 tablet, Rfl: 0    clopidogrel (PLAVIX) 75 MG tablet, Take 1 tablet by mouth Daily., Disp: , Rfl:     LORazepam (ATIVAN) 0.5 MG tablet, Take half to 1 tab po bid prn anxiety, Disp: 10 tablet, Rfl: 0    multivitamin with minerals tablet tablet, Take 1 tablet by mouth Daily., Disp: , Rfl:     spironolactone (ALDACTONE) 25 MG tablet, Take 1 tablet by mouth Daily., Disp: 30 tablet, Rfl: 0    venlafaxine XR (EFFEXOR-XR) 75 MG 24 hr capsule, Take 1 capsule by mouth Daily., Disp: , Rfl:     vitamin D3 125 MCG (5000 UT) capsule capsule, Take 1 capsule by mouth Daily., Disp: , Rfl:   Allergies:  Allergies   Allergen Reactions    Prednisone Unknown - Low Severity     Social History:   Social History     Tobacco Use    Smoking status: Former     Types: Cigarettes    Smokeless tobacco: Never   Substance Use Topics    Alcohol use: No     Comment: caffeine use      Family History:  Family History   Problem Relation Age of Onset    Stroke Mother     Stroke Brother     Breast cancer Neg Hx           Review of Systems  See history of present illness and past medical history.  As Dr. Sands in the      Vitals:   /71   Pulse 71   Temp 97.5 °F (36.4 °C) (Tympanic)   Resp 18   Ht 162.6 cm (64\")   Wt 75.7 kg (166 lb 14.2 oz)   SpO2 95%   BMI 28.65 kg/m²   I/O: No intake or output data in the 24 hours ending 01/20/24 1637  Exam:  Patient " is examined using the personal protective equipment as per guidelines from infection control for this particular patient as enacted.  Hand washing was performed before and after patient interaction.  General Appearance:  Awake alert talkative and according to son at bedside much improved compared to last evaluation arrived to the hospital.   Head:    Normocephalic, without obvious abnormality, atraumatic   Eyes:    PERRL, conjunctiva/corneas clear, EOM's intact, both eyes   Ears:    Normal external ear canals, both ears   Nose:   Nares normal, septum midline, mucosa normal, no drainage    or sinus tenderness   Throat:   Lips, tongue, gums normal; oral mucosa pink and moist   Neck: Supple   Back:     Symmetric, no curvature, ROM normal, no CVA tenderness   Lungs:     Clear to auscultation bilaterally, respirations unlabored   Chest Wall:    No tenderness or deformity    Heart:  S1-S2 regular   Abdomen:   Soft no guarding rigidity or rebound   Extremities:   Extremities normal, atraumatic, no cyanosis or edema   Pulses:   Pulses palpable in all extremities; symmetric all extremities   Skin: No rash but chronic ecchymotic changes noted   Neurologic: Grossly nonfocal exam improved strength in the right side.       Data Review:      I reviewed the patient's new clinical results.  Results from last 7 days   Lab Units 01/20/24  1426 01/19/24  0449 01/18/24  0439 01/17/24  0528 01/16/24  1029 01/15/24  0526 01/14/24  0642   WBC 10*3/mm3 7.62 8.46 8.65 9.41 8.47 7.81 10.47   HEMOGLOBIN g/dL 8.3* 8.3* 8.5* 8.8* 8.8* 9.0* 10.5*   PLATELETS 10*3/mm3 303 253 251 273 278 262 278     Results from last 7 days   Lab Units 01/20/24  1426 01/19/24  0449 01/18/24  0439 01/17/24  0528 01/16/24  1029 01/15/24  0526 01/14/24  0642   SODIUM mmol/L 139 138 142 138 139 141 136   POTASSIUM mmol/L 4.8 4.7 4.5 4.0 3.9 3.7 4.6   CHLORIDE mmol/L 104 102 106 104 104 105 98   CO2 mmol/L 26.0 26.0 24.1 26.0 26.0 25.0 20.0*   BUN mg/dL 31* 31* 30*  25* 23 21 28*   CREATININE mg/dL 1.52* 1.60* 1.62* 1.48* 1.31* 1.56* 1.64*   CALCIUM mg/dL 9.5 9.2 9.2 9.1 9.4 9.2 9.9   GLUCOSE mg/dL 111* 102* 100* 98 113* 94 94     CT Abdomen Pelvis With Contrast    Result Date: 1/20/2024  11 cm axial diameter, 10 cm long saccular infrarenal abdominal aortic aneurysm with mild haziness of the adjacent fat suggestive of pending rupture. The distal thoracic aorta is aneurysmally enlarged to 5 cm and there is extensive calcified and noncalcified atheromatous plaque in the distal thoracic aorta with multiple areas of ulceration in the noncalcified portions of plaque.  I discussed the case with Dr. Mcintosh at around 3:20 p.m.  Radiation dose reduction techniques were utilized, including automated exposure control and exposure modulation based on body size.   This report was finalized on 1/20/2024 4:19 PM by Dr. Alvarez Sewell M.D on Workstation: [id number]      CT Angiogram Head w AI Analysis of LVO    Result Date: 1/20/2024  1. Chronic bilateral cerebral infarctions appear similar to previous head CT. No evidence for intracranial hemorrhage or acute intracranial abnormality. Further evaluation could be performed with brain MRI if indicated. 2. Atherosclerotic disease with calcified plaques involving both carotid bulbs and proximal ICAs with 25% stenosis on the right and 15% to 20% stenosis on the left involving the ICA origins. 3. 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 4. Atherosclerotic disease involving the cavernous and supracavernous segments of both MCAs with moderate narrowing. Mild narrowing distal M1 segment right MCA. 5. Right vertebral artery is larger than the left and both vertebral arteries are tortuous. There is calcified plaque involving the intracranial segments of both vertebral arteries with up to severe stenosis of the post-PICA segment right vertebral artery which exhibits  undulating configuration with mild aneurysmal dilatation. 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 Fetal origin right PCA.   Findings of the noncontrasted exam discussed with Dr. Bustos at 2:44 p.m. Findings of the contrasted exam discussed with Dr. Bustos at 3:15 p.m. AI analysis of LVO was utilized.  Radiation dose reduction techniques were utilized, including automated exposure control and exposure modulation based on body size.   This report was finalized on 1/20/2024 3:56 PM by Dr. Curly Riojas M.D on Workstation: [id number]      CT Angiogram Neck    Result Date: 1/20/2024  1. Chronic bilateral cerebral infarctions appear similar to previous head CT. No evidence for intracranial hemorrhage or acute intracranial abnormality. Further evaluation could be performed with brain MRI if indicated. 2. Atherosclerotic disease with calcified plaques involving both carotid bulbs and proximal ICAs with 25% stenosis on the right and 15% to 20% stenosis on the left involving the ICA origins. 3. 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 4. Atherosclerotic disease involving the cavernous and supracavernous segments of both MCAs with moderate narrowing. Mild narrowing distal M1 segment right MCA. 5. Right vertebral artery is larger than the left and both vertebral arteries are tortuous. There is calcified plaque involving the intracranial segments of both vertebral arteries with up to severe stenosis of the post-PICA segment right vertebral artery which exhibits undulating configuration with mild aneurysmal dilatation. There is also dilatation of the basilar artery at its origin measuring 4 mm which exhibits mild  aneurysmal dilatation. 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 Fetal origin right PCA.   Findings of the noncontrasted exam discussed with Dr. Bustos at 2:44 p.m. Findings of the contrasted exam discussed with Dr. Bustos at 3:15 p.m. AI analysis of LVO was utilized.  Radiation dose reduction techniques were utilized, including automated exposure control and exposure modulation based on body size.   This report was finalized on 1/20/2024 3:56 PM by Dr. Curly Riojas M.D on Workstation: [id number]      CT CEREBRAL PERFUSION WITH & WITHOUT CONTRAST    Result Date: 1/20/2024  1. Chronic bilateral cerebral infarctions appear similar to previous head CT. No evidence for intracranial hemorrhage or acute intracranial abnormality. Further evaluation could be performed with brain MRI if indicated. 2. Atherosclerotic disease with calcified plaques involving both carotid bulbs and proximal ICAs with 25% stenosis on the right and 15% to 20% stenosis on the left involving the ICA origins. 3. 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 4. Atherosclerotic disease involving the cavernous and supracavernous segments of both MCAs with moderate narrowing. Mild narrowing distal M1 segment right MCA. 5. Right vertebral artery is larger than the left and both vertebral arteries are tortuous. There is calcified plaque involving the intracranial segments of both vertebral arteries with up to severe stenosis of the post-PICA segment right vertebral artery which exhibits undulating configuration with mild aneurysmal dilatation. 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 Multifocal  mild to moderate basilar arterial stenoses. Fetal origin right PCA.   Findings of the noncontrasted exam discussed with Dr. Bustos at 2:44 p.m. Findings of the contrasted exam discussed with Dr. Bustos at 3:15 p.m. AI analysis of LVO was utilized.  Radiation dose reduction techniques were utilized, including automated exposure control and exposure modulation based on body size.   This report was finalized on 1/20/2024 3:56 PM by Dr. Curly Riojas M.D on Workstation: [id number]      XR Chest 1 View    Result Date: 1/20/2024  Stable chronic change in the chest. No acute abnormality identified.  This report was finalized on 1/20/2024 3:38 PM by Dr. Alvarez Sewell M.D on Workstation: [id number]     ECG 12 Lead Stroke Evaluation   Preliminary Result   HEART RATE= 63  bpm   RR Interval= 952  ms   AZ Interval= 192  ms   P Horizontal Axis= 14  deg   P Front Axis= 74  deg   QRSD Interval= 110  ms   QT Interval= 470  ms   QTcB= 482  ms   QRS Axis= 70  deg   T Wave Axis= 56  deg   - NORMAL ECG -   Sinus rhythm   Electronically Signed By:    Date and Time of Study: 2024-01-20 14:58:51      SCANNED - TELEMETRY     Final Result        Microbiology Results (last 10 days)       ** No results found for the last 240 hours. 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 [G30.1, F02.80]  Yes    Hypertension [I10]  Yes       Medical decision making/care plan: See admitting orders  Syncope with right-sided weakness with  associated hypoxia followed by spontaneous improvement after dilution of hypertension in the setting of significant nonoperable atherosclerotic disease and aneurysm of intracranial and extracranial vasculature-plan to avoid hypotension, allow for permissive hypertension with difficult situation as it would put her at high risk of abdominal aortic aneurysm rupture and intracranial hemorrhage noted but at that time control of blood pressure put her at risk of perfusion related neurological deficits and syncope.  This difficulty was discussed with the patient's son at the bedside and he agreed for palliative care consult at the same time once with cardiac monitoring and interventions short of CPR and intubation.  Neurology neurosurgery service has been consulted due to the hospitalization as well as neurosurgery service have been consulted during his hospitalization and the recommendation for her to be a nonsurgical candidate noted and we discussed with the patient.  Whether or not these reason consult again deferred based on patient's family members expectations and evolving condition.  Dementia-monitor for sundowning  Hypertension with labile blood pressure and tendency for hypotension after blood pressure medication-hold antihypertensives systolic blood pressures 100 or less.  Avoid hypotensive source.  Critically enlarged with impending rupture of intra-abdominal aortic and recent-monitor.  See evaluation and recommendation by vascular surgery service during hospitalization for which she was discharged on 1/19/2024.    Giles Sands MD   1/20/2024  16:37 EST    Parts of this note may be an electronic transcription/translation of spoken language to printed text using the Dragon dictation system.

## 2024-01-21 ENCOUNTER — READMISSION MANAGEMENT (OUTPATIENT)
Dept: CALL CENTER | Facility: HOSPITAL | Age: 89
End: 2024-01-21
Payer: MEDICARE

## 2024-01-21 VITALS
HEIGHT: 64 IN | HEART RATE: 59 BPM | RESPIRATION RATE: 20 BRPM | TEMPERATURE: 97.7 F | DIASTOLIC BLOOD PRESSURE: 56 MMHG | OXYGEN SATURATION: 97 % | SYSTOLIC BLOOD PRESSURE: 92 MMHG | WEIGHT: 169.48 LBS | BODY MASS INDEX: 28.93 KG/M2

## 2024-01-21 LAB
QT INTERVAL: 470 MS
QTC INTERVAL: 482 MS

## 2024-01-21 PROCEDURE — 99223 1ST HOSP IP/OBS HIGH 75: CPT | Performed by: PSYCHIATRY & NEUROLOGY

## 2024-01-21 PROCEDURE — G0378 HOSPITAL OBSERVATION PER HR: HCPCS

## 2024-01-21 RX ADMIN — ALLOPURINOL 300 MG: 300 TABLET ORAL at 09:36

## 2024-01-21 RX ADMIN — OXYCODONE HYDROCHLORIDE AND ACETAMINOPHEN 500 MG: 500 TABLET ORAL at 09:35

## 2024-01-21 RX ADMIN — VENLAFAXINE HYDROCHLORIDE 75 MG: 75 CAPSULE, EXTENDED RELEASE ORAL at 09:35

## 2024-01-21 RX ADMIN — Medication 10 ML: at 09:37

## 2024-01-21 RX ADMIN — CLOPIDOGREL BISULFATE 75 MG: 75 TABLET, FILM COATED ORAL at 09:35

## 2024-01-21 RX ADMIN — Medication 1 TABLET: at 09:35

## 2024-01-21 RX ADMIN — AMLODIPINE BESYLATE 2.5 MG: 2.5 TABLET ORAL at 09:35

## 2024-01-21 RX ADMIN — SPIRONOLACTONE 25 MG: 25 TABLET ORAL at 09:35

## 2024-01-21 RX ADMIN — CARVEDILOL 25 MG: 25 TABLET, FILM COATED ORAL at 09:43

## 2024-01-21 RX ADMIN — BUSPIRONE HYDROCHLORIDE 10 MG: 10 TABLET ORAL at 09:35

## 2024-01-21 NOTE — PLAN OF CARE
Goal Outcome Evaluation:  Plan of Care Reviewed With: patient           Outcome Evaluation: discharged home with family                               Problem: Adult Inpatient Plan of Care  Goal: Plan of Care Review  Outcome: Met  Flowsheets (Taken 1/21/2024 1112)  Plan of Care Reviewed With: patient  Outcome Evaluation: discharged home with family  Goal: Patient-Specific Goal (Individualized)  Outcome: Met  Goal: Absence of Hospital-Acquired Illness or Injury  Outcome: Met  Goal: Optimal Comfort and Wellbeing  Outcome: Met  Goal: Readiness for Transition of Care  Outcome: Met

## 2024-01-21 NOTE — OUTREACH NOTE
Prep Survey      Flowsheet Row Responses   [facility] facility patient discharged from? [facility]   Is LACE score < 7 ? No   Eligibility Readm Mgmt   Discharge diagnosis Syncope and collapse, dementia, recent TIA   Does the patient have one of the following disease processes/diagnoses(primary or secondary)? Other   Does the patient have Home health ordered? No   Is there a DME ordered? No   Prep survey completed? Yes            Kellie SCHMIDT - Registered Nurse

## 2024-01-21 NOTE — SIGNIFICANT NOTE
01/21/24 0943   OTHER   Discipline occupational therapist   Rehab Time/Intention   Session Not Performed other (see comments)  (awaiting further hospital course to determine appropriateness for OT, pt with neurology and palliative consults in place. Await  GOC. f/u tomorrow)   Recommendation   OT - Next Appointment 01/22/24

## 2024-01-21 NOTE — CASE MANAGEMENT/SOCIAL WORK
Case Management Discharge Note      Final Note: pt dc'd to home via private auto         Selected Continued Care - Discharged on 1/21/2024 Admission date: 1/20/2024 - Discharge disposition: Home or Self Care      Destination    No services have been selected for the patient.                Durable Medical Equipment    No services have been selected for the patient.                Dialysis/Infusion    No services have been selected for the patient.                Home Medical Care    No services have been selected for the patient.                Therapy    No services have been selected for the patient.                Community Resources    No services have been selected for the patient.                Community & DME    No services have been selected for the patient.                    Selected Continued Care - Prior Encounters Includes continued care and service providers with selected services from prior encounters from 10/22/2023 to 1/21/2024      Discharged on 1/19/2024 Admission date: 1/14/2024 - Discharge disposition: Home or Self Care      Home Medical Care       Service Provider Selected Services Address Phone Fax Patient Preferred    [facility] - [facility] [address] [place] [address] [place], [place] [id number] [phone] --    [facility] [address] [address] [phone] --                          Transportation Services  Private: Car    Final Discharge Disposition Code: 01 - home or self-care

## 2024-01-21 NOTE — DISCHARGE SUMMARY
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          Consults       Date and Time Order Name Status Description    1/20/2024  5:42 PM Inpatient Neurology Consult Stroke      1/20/2024  3:40 PM [facility] (on-call MD unless specified) Details      1/20/2024  2:21 PM Inpatient Neurology Consult Stroke      1/20/2024  2:21 PM Inpatient Neurology Consult Stroke      1/17/2024 10:54 AM Inpatient Vascular Surgery Consult Completed     1/16/2024 11:49 AM Inpatient Neurosurgery Consult Completed     1/14/2024  1:29 PM Inpatient Cardiology Consult Completed     1/14/2024  3:46 AM Inpatient Neurology Consult Stroke Completed           Hospital Course  89 y.o. female recently discharged from the hospital with intracranial aneurysms (non operative per neurosurgery) as well as >10 cm abdominal aortic aneurysm (and thoracic aortic aneurysm) and due to her multiple comorbidities and advanced age family members declined any surgery and she was discharged home with the ultimate goal of care being comfort and focus of care being quality of life.     At home she developed right-sided weakness and a syncopal episode when EMS arrived she was unresponsive and hypotensive with right-sided  hemiparesis. She was hydrated and monitored overnight and returned to baseline. After review family member inadvertently had given her medication that was stopped at discharge last time. Son at bedside today would like to take patient home today and family plans to follow-up with [facility] when they feel it is appropriate. Family is aware long-term prognosis is poor. They also wanted to take her off diuretic so she did not have to get up in a hurry to urinate.    I discussed the patient's findings and my recommendations with patient, family, and nursing staff and Dr Garcia.     Temp:  [97.3 °F (36.3 °C)-97.7 °F (36.5 °C)] 97.7 °F (36.5 °C)  Heart Rate:  [57-71] 64  Resp:  [18] 18  BP: ()/(30-94) 137/85  Body mass index is 29.09 kg/m².    Physical Exam  Constitutional:       General: She is not in acute distress.     Appearance: She is not toxic-appearing.      Comments: Frail, elderly   HENT:      Head: Normocephalic and atraumatic.   Cardiovascular:      Rate and Rhythm: Normal rate.      Heart sounds: Murmur heard.   Pulmonary:      Effort: Pulmonary effort is normal. No respiratory distress.      Breath sounds: Normal breath sounds. No wheezing or rhonchi.   Abdominal:      General: Bowel sounds are normal.      Palpations: Abdomen is soft.      Tenderness: There is no abdominal tenderness. There is no guarding or rebound.   Musculoskeletal:         General: No swelling.   Skin:     General: Skin is warm and dry.   Neurological:      Mental Status: She is alert.   Psychiatric:         Mood and Affect: Mood normal.         Behavior: Behavior normal.       Disposition: Home or Self Care       Discharge Medications        Continue These Medications        Instructions Start Date   allopurinol 300 MG tablet  Commonly known as: ZYLOPRIM   300 mg, Oral, Daily      amLODIPine 2.5 MG tablet  Commonly known as: NORVASC   2.5 mg, Oral, Every 12 Hours      ascorbic acid 500 MG tablet  Commonly known as: VITAMIN C    500 mg, Oral, Daily      aspirin 81 MG chewable tablet   81 mg, Oral, Daily      atorvastatin 40 MG tablet  Commonly known as: LIPITOR   40 mg, Oral, Nightly      busPIRone 10 MG tablet  Commonly known as: BUSPAR   10 mg, Oral, 2 Times Daily      carvedilol 25 MG tablet  Commonly known as: COREG   25 mg, Oral, 2 Times Daily      clopidogrel 75 MG tablet  Commonly known as: PLAVIX   75 mg, Oral, Daily      LORazepam 0.5 MG tablet  Commonly known as: ATIVAN   Take half to 1 tab po bid prn anxiety      multivitamin with minerals tablet tablet   1 tablet, Oral, Daily      spironolactone 25 MG tablet  Commonly known as: ALDACTONE   25 mg, Oral, Daily      venlafaxine XR 75 MG 24 hr capsule  Commonly known as: EFFEXOR-XR   75 mg, Oral, Daily      vitamin D3 125 MCG (5000 UT) capsule capsule   5,000 Units, Oral, Daily              Diet Instructions       Diet: Regular/House Diet      Discharge Diet: Regular/House Diet    Texture: Regular Texture (IDDSI 7)    Fluid Consistency: Thin (IDDSI 0)           Activity Instructions       Activity as Tolerated             Additional Instructions for the Follow-ups that You Need to Schedule       Call MD for problems / concerns.   As directed             Follow-up Information       Tonny Willis MD Follow up in 1 week(s).    Specialty: Family Medicine  Why: post hospital follow up  Contact information:  [address]  [place] [address] [phone]                            Future Appointments   Date Time Provider Department Center   2/1/2024 10:30 AM Chino Blanco MD [facility]   7/19/2024  2:00 PM Juan Jose Gonzalez III, MD [facility]        [facility] Frandy Portillo MD  [facility]  01/21/24    Discharge time spent greater than 30 minutes.

## 2024-01-21 NOTE — PLAN OF CARE
Goal Outcome Evaluation:  Plan of Care Reviewed With: patient        Progress: no change     Patient hd a calm shift, family member refused some of patients medication because she was said to have taken the morning dose of the medications before she came to the hospital. Son and other family were carried along with patient care plan. Patient son efren was by her bed side all through the shift, was reassured on the plan of care of his mom. Lab collection for testing was refused by family this morning.

## 2024-01-21 NOTE — CONSULTS
"Neurology Consult Note    Consult Date: 1/21/2024    Referring MD: Dr. Portillo    Reason for Consult I have been asked to see the patient in neurological consultation to render advice and opinion regarding TIA    Antonette King is a 89 y.o. female with a history of large aortic aneurysm, large left terminal ICA aneurysm, Alzheimer's dementia, CAD, breast cancer, hypertension, hyperlipidemia    She was just recently seen by our service for a TIA in the previous week.  She had done home with instructions to back off on some blood pressure medication however there was an oversight and the patient did end up receiving all of her blood pressure medications yesterday morning.  She became hypotensive and developed aphasia.  On presentation to the emergency department she was severely hypotensive with left gaze deviation and right-sided weakness.  She underwent CTA of the head, neck and chest.  These showed a very large aortic aneurysm which was unchanged as well as large left carotid aneurysm, also stable.  She was resuscitated with IV fluids and recovered overnight back to her neurologic baseline.  No complaints today.    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 04/16/2021    History of blood clots     Hx of radiation therapy     2001, left breast ca    Hyperlipidemia 04/16/2021    Hypertension 04/16/2021    S/P CABG (coronary artery bypass graft) 10/12/2022       Exam  /85 (BP Location: Right arm, Patient Position: Lying)   Pulse 64   Temp 97.7 °F (36.5 °C) (Oral)   Resp 18   Ht 162.6 cm (64\")   Wt 76.9 kg (169 lb 7.7 oz)   SpO2 97%   BMI 29.09 kg/m²   Gen: NAD, vitals reviewed  MS: Pleasantly disoriented, memory impaired, impaired attention/concentration  CN: Pupils 2 mm, conjugate gaze, no facial asymmetry, mild " dysarthria  Motor: 4+/5 throughout upper and lower extremities, normal tone    DATA:    Lab Results   Component Value Date    GLUCOSE 111 (H) 01/20/2024    CALCIUM 9.5 01/20/2024     01/20/2024    K 4.8 01/20/2024    CO2 26.0 01/20/2024     01/20/2024    BUN 31 (H) 01/20/2024    CREATININE 1.52 (H) 01/20/2024    EGFRIFNONA 63 04/16/2021    BCR 20.4 01/20/2024    ANIONGAP 9.0 01/20/2024     Lab Results   Component Value Date    WBC 7.62 01/20/2024    HGB 8.3 (L) 01/20/2024    HCT 26.4 (L) 01/20/2024    MCV 86.3 01/20/2024     01/20/2024       Lab review: Creatinine 1.5, hemoglobin 8.3    Imaging review: I personally reviewed her CTA head and neck and CTA of the chest performed yesterday as described above and discussed the studies with the reading radiologist.    Diagnoses:  Transient ischemic attack due to severe hypotension in the setting of large left terminal ICA aneurysm  Aortic dissection, chronic  Hypotension due to medication    Pre-stroke MRS: 4  NIHSS: 1    Comment: Symptoms consistent with transient ischemia of the left middle cerebral artery territory likely due to hypoperfusion of her very abnormal terminal left ICA which is markedly aneurysmal.  Fortunately she improved with IV fluids.  Goals of care discussed with patient's son.  They do not want any kind of escalation of interventions and would like to take her back home today if possible.    PLAN:  Continue low-dose aspirin, statin  BP medication adjustments    Okay for discharge from neurology standpoint    Management discussed with Dr. Portillo

## 2024-01-21 NOTE — OUTREACH NOTE
Stroke Week 1 Survey      Flowsheet Row Responses   [facility] facility patient discharged from? [place]   Does the patient have one of the following disease processes/diagnoses(primary or secondary)? Stroke   Week 1 attempt successful? No   Unsuccessful attempts Attempt 1   Revoke Readmitted            Kellie SCHMIDT - Registered Nurse

## 2024-01-22 LAB — CREAT BLDA-MCNC: 1.7 MG/DL (ref 0.6–1.3)

## 2024-01-23 ENCOUNTER — READMISSION MANAGEMENT (OUTPATIENT)
Dept: CALL CENTER | Facility: HOSPITAL | Age: 89
End: 2024-01-23
Payer: MEDICARE

## 2024-01-23 NOTE — PROGRESS NOTES
"Enter Query Response Below      Query Response: Mild Alzheimer's dementia              If applicable, please update the problem list.     Patient: Antonette King        : 1934  Account: [id number]           Admit Date:         How to Respond to this query:       a. Click New Note     b. Answer query within the yellow box.                c. Update the Problem List, if applicable.      If you have any questions about this query contact me at: [email or web address]    ,     Patient admitted with stroke like symptoms.  Patient had confusion/altered mental status that resolved upon admission to ED.  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     After study, can the dementia be further specified as:  Mild Alzheimer's dementia   Moderate Alzheimer's dementia  Other- specify_____________  Unable to determine    By submitting this query, we are merely seeking further clarification of documentation to accurately reflect all conditions that you are monitoring, evaluating, treating or that extend the hospitalization or utilize additional resources of care. Please utilize your independent clinical judgment when addressing the question(s) above.     This query and your response, once completed, will be entered into the legal medical record.    Sincerely,  Carmen Davies RN BSN  Clinical Documentation Integrity Program     "

## 2024-01-23 NOTE — OUTREACH NOTE
Medical Week 1 Survey      Flowsheet Row Responses   [facility] facility patient discharged from? [place]   Does the patient have one of the following disease processes/diagnoses(primary or secondary)? Other   Week 1 attempt successful? No   Unsuccessful attempts Attempt 1   Revoke Change in health status-moved to LTC/SNF/Hospice  [Hospice care]            Kathia HARTMANN - Registered Nurse

## 2024-01-26 ENCOUNTER — TELEPHONE (OUTPATIENT)
Dept: CARDIOLOGY | Facility: CLINIC | Age: 89
End: 2024-01-26
Payer: MEDICARE

## 2024-01-26 NOTE — TELEPHONE ENCOUNTER
Dr. Arreola is requesting to speak with Dr. Gonzalez regarding Antonette SLIM King.  Dr. Arreola saw patient in office today and has some questions/concern regarding medication management for patient.  Dr. Arreola would like a callback today.    Thank you,  Alisa ESPINOZA RN  Triage Nurse [facility]   10:46 EST
